# Patient Record
Sex: FEMALE | Race: WHITE | Employment: OTHER | RURAL
[De-identification: names, ages, dates, MRNs, and addresses within clinical notes are randomized per-mention and may not be internally consistent; named-entity substitution may affect disease eponyms.]

---

## 2017-02-06 RX ORDER — ZOLPIDEM TARTRATE 5 MG/1
5-10 TABLET ORAL
Qty: 30 TAB | Refills: 0 | Status: SHIPPED | OUTPATIENT
Start: 2017-02-06 | End: 2017-02-09 | Stop reason: SDUPTHER

## 2017-02-08 ENCOUNTER — DOCUMENTATION ONLY (OUTPATIENT)
Dept: FAMILY MEDICINE CLINIC | Age: 77
End: 2017-02-08

## 2017-02-08 NOTE — PROGRESS NOTES
HISTORY OF PRESENT ILLNESS  Riddhi Morocho is a 77 y.o. female.   HPI    ROS    Physical Exam    ASSESSMENT and PLAN  {ASSESSMENT/PLAN:[id number]}

## 2017-02-10 RX ORDER — ZOLPIDEM TARTRATE 5 MG/1
5-10 TABLET ORAL
Qty: 30 TAB | Refills: 0 | Status: SHIPPED | OUTPATIENT
Start: 2017-02-10 | End: 2017-04-17 | Stop reason: SDUPTHER

## 2017-04-17 ENCOUNTER — OFFICE VISIT (OUTPATIENT)
Dept: FAMILY MEDICINE CLINIC | Age: 77
End: 2017-04-17

## 2017-04-17 VITALS
BODY MASS INDEX: 28.7 KG/M2 | HEART RATE: 63 BPM | TEMPERATURE: 97.7 F | WEIGHT: 152 LBS | OXYGEN SATURATION: 100 % | RESPIRATION RATE: 16 BRPM | DIASTOLIC BLOOD PRESSURE: 49 MMHG | HEIGHT: 61 IN | SYSTOLIC BLOOD PRESSURE: 134 MMHG

## 2017-04-17 DIAGNOSIS — Z85.43 HISTORY OF OVARIAN CANCER: ICD-10-CM

## 2017-04-17 DIAGNOSIS — Z13.39 SCREENING FOR ALCOHOLISM: ICD-10-CM

## 2017-04-17 DIAGNOSIS — E78.00 PURE HYPERCHOLESTEROLEMIA: ICD-10-CM

## 2017-04-17 DIAGNOSIS — I10 UNSPECIFIED ESSENTIAL HYPERTENSION: ICD-10-CM

## 2017-04-17 DIAGNOSIS — F51.01 PRIMARY INSOMNIA: ICD-10-CM

## 2017-04-17 DIAGNOSIS — Z13.31 SCREENING FOR DEPRESSION: ICD-10-CM

## 2017-04-17 DIAGNOSIS — Z00.00 ROUTINE GENERAL MEDICAL EXAMINATION AT A HEALTH CARE FACILITY: Primary | ICD-10-CM

## 2017-04-17 DIAGNOSIS — C56.9 MALIGNANT NEOPLASM OF OVARY, UNSPECIFIED LATERALITY (HCC): ICD-10-CM

## 2017-04-17 RX ORDER — ZOLPIDEM TARTRATE 5 MG/1
5 TABLET ORAL
Qty: 90 TAB | Refills: 1 | Status: SHIPPED | OUTPATIENT
Start: 2017-04-17 | End: 2017-04-21 | Stop reason: SDUPTHER

## 2017-04-17 NOTE — PROGRESS NOTES
Duran Schuler is a 77 y.o. female presenting for/with:    Cholesterol Problem and Annual Wellness Visit ()    HPI:  HTN  BP's ok lately. Remains on norvasc, hyzaar, aldactone, and metoprolol. Taking regularly. Lab Results   Component Value Date/Time    Sodium 137 11/15/2016 08:54 AM    Potassium 4.5 11/15/2016 08:54 AM    Chloride 96 11/15/2016 08:54 AM    CO2 26 11/15/2016 08:54 AM    Glucose 113 11/15/2016 08:54 AM    BUN 13 11/15/2016 08:54 AM    Creatinine 0.81 11/15/2016 08:54 AM    BUN/Creatinine ratio 16 11/15/2016 08:54 AM    GFR est AA 82 11/15/2016 08:54 AM    GFR est non-AA 71 11/15/2016 08:54 AM    Calcium 9.4 11/15/2016 08:54 AM     Hyperlipidemia. On questran and benefiber. Aren well. No myalgias, arthralgias, unusual weakness. Lab Results   Component Value Date/Time    Cholesterol, total 204 11/15/2016 08:54 AM    HDL Cholesterol 48 11/15/2016 08:54 AM    LDL, calculated 102 11/15/2016 08:54 AM    VLDL, calculated 54 11/15/2016 08:54 AM    Triglyceride 269 11/15/2016 08:54 AM     Lab Results   Component Value Date/Time    ALT (SGPT) 22 11/15/2016 08:54 AM    AST (SGOT) 18 11/15/2016 08:54 AM    Alk. phosphatase 73 11/15/2016 08:54 AM    Bilirubin, total 0.3 11/15/2016 08:54 AM     LBP  Has been doing better since last visit on aleve. PMH, SH, Medications/Allergies: reviewed, on chart.     ROS:  Constitutional: No fever, chills or weight loss  Respiratory: No cough, SOB   CV: No chest pain or Palpitations    Visit Vitals    /49 (BP 1 Location: Right arm, BP Patient Position: Sitting)    Pulse 63    Temp 97.7 °F (36.5 °C) (Oral)    Resp 16    Ht 5' 1\" (1.549 m)    Wt 152 lb (68.9 kg)    SpO2 100%    BMI 28.72 kg/m2     Wt Readings from Last 3 Encounters:   04/17/17 152 lb (68.9 kg)   11/17/16 152 lb (68.9 kg)   09/14/16 150 lb (68 kg)     BP Readings from Last 3 Encounters:   04/17/17 134/49   11/17/16 125/60   09/14/16 146/66     Physical Examination: General appearance - alert, well appearing, and in no distress  Mental status - alert, oriented to person, place, and time  Eyes - pupils equal and reactive, extraocular eye movements intact  ENT - bilateral external ears and nose normal. Normal lips  Neck - supple, no significant adenopathy, no thyromegaly or mass  Lymphatics - no palpable lymphadenopathy, no hepatosplenomegaly  Chest - clear to auscultation, no wheezes, rales or rhonchi, symmetric air entry  Heart - normal rate, regular rhythm, normal S1, S2, no murmurs, rubs, clicks or gallops  Abd - NABS. SNT, no HSM/Mass. Extremities - peripheral pulses normal, no pedal edema, no clubbing or cyanosis. Lab Results   Component Value Date/Time    WBC 7.4 04/14/2016 04:04 PM    HGB 11.3 04/14/2016 04:04 PM    HCT 33.5 04/14/2016 04:04 PM    PLATELET 563 62/20/7658 04:04 PM    MCV 83 04/14/2016 04:04 PM     A/P:  HTN  In goal. Con't current regimen. Hx ovarian cancer. Doing well on questran at [facility]. Last Ca125 was ok 11/2016. Recheck today. HLD  lipid panel looks good on questran. Con't. DJD  Try aleve. Also can use APAP or T3's (has plenty). If working well, can order at [facility]. Insomnia  Doing well on ambien 5mg QHS. Try changing to mail ore 1 QHS #90 RF1. F/U 6mo/PRN    __________________________________________________________________________________________________________________________    Problem List: Reviewed with patient and discussed risk factors.     Patient Active Problem List   Diagnosis Code    Unspecified essential hypertension I10    Hyperlipemia E78.5    Esophageal reflux K21.9    Malignant neoplasm of ovary (HCC) C56.9    Cystocele N81.10    Presence of pessary Z92.89    Cervical prolapse N81.4    Postmenopausal vaginal bleeding N95.0    BCE (basal cell epithelioma) C44.91    History of ovarian cancer Z85.43    Midline low back pain without sciatica M54.5       Current medical providers:  Patient Care Team:  Caitlin Acosta MD as PCP - General (Family Practice)    PSH: Reviewed with patient  Past Surgical History:   Procedure Laterality Date    HX APPENDECTOMY      HX COLECTOMY      ([facility])    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    HX SALPINGO-OOPHORECTOMY  7/03    Exploratory Lap, omentectomy, tumor debulking Chely Yates)    HX TONSILLECTOMY  1964        SH: Reviewed with patient  Social History   Substance Use Topics    Smoking status: Former Smoker    Smokeless tobacco: Never Used    Alcohol use 0.0 oz/week     0 Standard drinks or equivalent per week      Comment: minimal       FH: Reviewed with patient  Family History   Problem Relation Age of Onset    Stroke Mother     Heart Disease Father        Medications/Allergies: Reviewed with patient  Current Outpatient Prescriptions on File Prior to Visit   Medication Sig Dispense Refill    spironolactone (ALDACTONE) 25 mg tablet TAKE 1/2 TABLET BY MOUTH DAILY 45 Tab 3    omeprazole (PRILOSEC) 20 mg capsule TAKE 1 CAP BY MOUTH TWO  TIMES A DAY. INDICATIONS:  PREVENTION OF NSAID-INDUCED GASTRIC ULCER 180 Cap 3    losartan-hydroCHLOROthiazide (HYZAAR) 100-12.5 mg per tablet Take 1 Tab by mouth daily. Indications: pressure 90 Tab 3    metoprolol tartrate (LOPRESSOR) 50 mg tablet Take 1 Tab by mouth two (2) times a day. Indications: pressure and heart 180 Tab 3    amLODIPine (NORVASC) 10 mg tablet Take 1 Tab by mouth daily. Indications: pressure 90 Tab 3    acetaminophen (TYLENOL ARTHRITIS PAIN) 650 mg CR tablet Take 650 mg by mouth every six (6) hours as needed for Pain.  acetaminophen-codeine (TYLENOL #3) 300-30 mg per tablet Take 1 Tab by mouth two (2) times daily as needed for Pain. Max Daily Amount: 2 Tabs. 180 Tab 1    pregabalin (LYRICA) 75 mg capsule Take 1 Cap by mouth two (2) times daily as needed. Max Daily Amount: 150 mg. Indications: neuropathy 14 Cap 0    cholestyramine-sucrose (QUESTRAN) 4 gram powder Take 4 g by mouth two (2) times daily (with meals).  6 Can 3    aspirin delayed-release 81 mg tablet Take  by mouth daily.  ALPRAZolam (XANAX) 0.5 mg tablet Take 0.5 mg by mouth. 1/2 Pill PRN when needed       No current facility-administered medications on file prior to visit. Allergies   Allergen Reactions    Feldene [Piroxicam] Other (comments)     Stomach problem    Toradol [Ketorolac Tromethamine] Itching    Voltaren [Diclofenac Sodium] Diarrhea       Objective:  Visit Vitals    /49 (BP 1 Location: Right arm, BP Patient Position: Sitting)    Pulse 63    Temp 97.7 °F (36.5 °C) (Oral)    Resp 16    Ht 5' 1\" (1.549 m)    Wt 152 lb (68.9 kg)    SpO2 100%    BMI 28.72 kg/m2    Body mass index is 28.72 kg/(m^2). Assessment of cognitive impairment: Alert and oriented x 3    Depression Screen:   PHQ 2 / 9, over the last two weeks 4/17/2017   Little interest or pleasure in doing things Not at all   Feeling down, depressed or hopeless Not at all   Total Score PHQ 2 0       Fall Risk Assessment:    Fall Risk Assessment, last 12 mths 4/17/2017   Able to walk? Yes   Fall in past 12 months? No   Fall with injury? -   Number of falls in past 12 months -   Fall Risk Score -       Functional Ability:   Does the patient exhibit a steady gait? yes   How long did it take the patient to get up and walk from a sitting position? 2 s   Is the patient self reliant?  (ie can do own laundry, meals, household chores)  yes     Does the patient handle his/her own medications? yes     Does the patient handle his/her own money? yes     Is the patients home safe (ie good lighting, handrails on stairs and bath, etc.)? yes     Did you notice or did patient express any hearing difficulties? yes     Did you notice or did patient express any vision difficulties?   no     Were distance and reading eye charts used?   no       Advance Care Planning:   Patient was offered the opportunity to discuss advance care planning:  yes     Does patient have an Advance Directive:  yes   If no, did you provide information on Caring Connections?  no       Plan:      Pt declined DXA at this time. Colon checks probably completed, had good looking CT abd/pelvis 2015 without lesion and is over 75yo now. Health Maintenance   Topic Date Due    DTaP/Tdap/Td series (1 - Tdap) 08/25/1962    ZOSTER VACCINE AGE 60>  08/25/2001    GLAUCOMA SCREENING Q2Y  08/25/2006    OSTEOPOROSIS SCREENING (DEXA)  08/25/2006    MEDICARE YEARLY EXAM  08/25/2006    Pneumococcal 65+ High/Highest Risk (2 of 2 - PPSV23) 01/08/2016    INFLUENZA AGE 9 TO ADULT  08/01/2016     *Patient verbalized understanding and agreement with the plan. A copy of the After Visit Summary with personalized health plan was given to the patient today.

## 2017-04-17 NOTE — PATIENT INSTRUCTIONS
Schedule of Personalized Health Plan  (Provide Copy to Patient)  The best way to stay healthy is to live a healthy lifestyle. A healthy lifestyle includes regular exercise, eating a well-balanced diet, keeping a healthy weight and not smoking. Regular physical exams and screening tests are another important way to take care of yourself. Preventive exams provided by health care providers can find health problems early when treatment works best and can keep you from getting certain diseases or illnesses. Preventive services include exams, lab tests, screenings, shots, monitoring and information to help you take care of your own health. All people over 65 should have a pneumonia shot. Pneumonia shots are usually only needed once in a lifetime unless your doctor decides differently. All people over 65 should have a yearly flu shot. People over 65 are at medium to high risk for Hepatitis B. Three shots are needed for complete protection. In addition to your physical exam, some screening tests are recommended:    Bone mass measurement (dexa scan) is recommended every two years  Diabetes Mellitus screening is recommended every year. Glaucoma is an eye disease caused by high pressure in the eye. An eye exam is recommended every year. Cardiovascular screening tests that check your cholesterol and other blood fat (lipid) levels are recommended every five years. Colorectal Cancer screening tests help to find pre-cancerous polyps (growths in the colon) so they can be removed before they turn into cancer. Tests ordered for screening depend on your personal and family history risk factors.     Screening for Breast Cancer is recommended yearly with a mammogram.    Screening for Cervical Cancer is recommended every two years (annually for certain risk factors, such as previous history of STD or abnormal PAP in past 7 years), with a Pelvic Exam with PAP    Here is a list of your current Health Maintenance items with a due date:  Health Maintenance   Topic Date Due    DTaP/Tdap/Td series (1 - Tdap) 08/25/1962    ZOSTER VACCINE AGE 60>  08/25/2001    GLAUCOMA SCREENING Q2Y  08/25/2006    OSTEOPOROSIS SCREENING (DEXA)  08/25/2006    MEDICARE YEARLY EXAM  08/25/2006    Pneumococcal 65+ High/Highest Risk (2 of 2 - PPSV23) 01/08/2016    INFLUENZA AGE 9 TO ADULT  08/01/2016

## 2017-04-17 NOTE — MR AVS SNAPSHOT
Visit Information Date & Time Provider Department Dept. Phone Encounter #  
 4/17/2017 10:10 AM Rima Mann MD [address] [place] [address] [id number] Upcoming Health Maintenance Date Due DTaP/Tdap/Td series (1 - Tdap) 8/25/1962 ZOSTER VACCINE AGE 60> 8/25/2001 GLAUCOMA SCREENING Q2Y 8/25/2006 OSTEOPOROSIS SCREENING (DEXA) 8/25/2006 MEDICARE YEARLY EXAM 8/25/2006 Pneumococcal 65+ High/Highest Risk (2 of 2 - PPSV23) 1/8/2016 INFLUENZA AGE 9 TO ADULT 8/1/2016 Allergies as of 4/17/2017  Review Complete On: 4/17/2017 By: Rima Mann MD  
  
 Severity Noted Reaction Type Reactions Feldene [Piroxicam]  02/11/2016    Other (comments) Stomach problem Toradol [Ketorolac Tromethamine]  06/10/2013    Itching Voltaren [Diclofenac Sodium]  06/10/2013    Diarrhea Current Immunizations  Never Reviewed Name Date Pneumococcal Conjugate (PCV-13) 11/13/2015 Pneumococcal Polysaccharide (PPSV-23) 10/29/2004 Not reviewed this visit You Were Diagnosed With   
  
 Codes Comments Routine general medical examination at a health care facility    -  Primary ICD-10-CM: Z00.00 ICD-9-CM: V70.0 History of ovarian cancer     ICD-10-CM: Z85.43 
ICD-9-CM: V10.43 Malignant neoplasm of ovary, unspecified laterality ([facility] [address]     ICD-10-CM: C56.9 ICD-9-CM: 183.0 Unspecified essential hypertension     ICD-10-CM: I10 
ICD-9-CM: 401.9 Pure hypercholesterolemia     ICD-10-CM: E78.00 ICD-9-CM: 272.0 Primary insomnia     ICD-10-CM: F51.01 
ICD-9-CM: 307.42 Screening for alcoholism     ICD-10-CM: Z13.89 ICD-9-CM: V79.1 Screening for depression     ICD-10-CM: Z13.89 ICD-9-CM: V79.0 Vitals BP Pulse Temp Resp Height(growth percentile) Weight(growth percentile) 134/49 (BP 1 Location: Right arm, BP Patient Position: Sitting) 63 97.7 °F (36.5 °C) (Oral) 16 5' 1\" (1.549 m) 152 lb (68.9 kg) SpO2 BMI OB Status Smoking Status 100% 28.72 kg/m2 Postmenopausal Former Smoker BMI and BSA Data Body Mass Index Body Surface Area 28.72 kg/m 2 1.72 m 2 Preferred Pharmacy Pharmacy Name Phone [facility] AT [facility] [address] [place] [phone] Your Updated Medication List  
  
   
This list is accurate as of: 4/17/17 11:33 AM.  Always use your most recent med list.  
  
  
  
  
 acetaminophen-codeine 300-30 mg per tablet Commonly known as:  TYLENOL #3 Take 1 Tab by mouth two (2) times daily as needed for Pain. Max Daily Amount: 2 Tabs. amLODIPine 10 mg tablet Commonly known as:  Alice Rafter Take 1 Tab by mouth daily. Indications: pressure  
  
 aspirin delayed-release 81 mg tablet Take  by mouth daily. cholestyramine-sucrose 4 gram powder Commonly known as:  [place] Take 4 g by mouth two (2) times daily (with meals). losartan-hydroCHLOROthiazide 100-12.5 mg per tablet Commonly known as:  HYZAAR Take 1 Tab by mouth daily. Indications: pressure  
  
 metoprolol tartrate 50 mg tablet Commonly known as:  LOPRESSOR Take 1 Tab by mouth two (2) times a day. Indications: pressure and heart  
  
 omeprazole 20 mg capsule Commonly known as:  PRILOSEC  
TAKE 1 CAP BY MOUTH TWO  TIMES A DAY. INDICATIONS:  PREVENTION OF NSAID-INDUCED GASTRIC ULCER  
  
 pregabalin 75 mg capsule Commonly known as:  Abe Handsome Take 1 Cap by mouth two (2) times daily as needed. Max Daily Amount: 150 mg. Indications: neuropathy  
  
 spironolactone 25 mg tablet Commonly known as:  ALDACTONE  
TAKE 1/2 TABLET BY MOUTH DAILY  
  
 TYLENOL ARTHRITIS PAIN 650 mg CR tablet Generic drug:  acetaminophen Take 650 mg by mouth every six (6) hours as needed for Pain. XANAX 0.5 mg tablet Generic drug:  ALPRAZolam  
Take 0.5 mg by mouth. 1/2 Pill PRN when needed  
  
 zolpidem 5 mg tablet Commonly known as:  AMBIEN Take 1 Tab by mouth nightly as needed for Sleep. Max Daily Amount: 5 mg. Indications: Sleep Prescriptions Printed Refills  
 zolpidem (AMBIEN) 5 mg tablet 1 Sig: Take 1 Tab by mouth nightly as needed for Sleep. Max Daily Amount: 5 mg. Indications: Sleep Class: Print Route: Oral  
  
We Performed the Following CANCER ANTIGEN 125 [45075 CPT(R)] Juliaf 68 [[id number] [facility]] METABOLIC PANEL, BASIC [18395 CPT(R)] Patient Instructions Schedule of Personalized Health Plan (Provide Copy to Patient) The best way to stay healthy is to live a healthy lifestyle. A healthy lifestyle includes regular exercise, eating a well-balanced diet, keeping a healthy weight and not smoking. Regular physical exams and screening tests are another important way to take care of yourself. Preventive exams provided by health care providers can find health problems early when treatment works best and can keep you from getting certain diseases or illnesses. Preventive services include exams, lab tests, screenings, shots, monitoring and information to help you take care of your own health. All people over 65 should have a pneumonia shot. Pneumonia shots are usually only needed once in a lifetime unless your doctor decides differently. All people over 65 should have a yearly flu shot. People over 65 are at medium to high risk for Hepatitis B. Three shots are needed for complete protection. In addition to your physical exam, some screening tests are recommended: 
 
Bone mass measurement (dexa scan) is recommended every two years Diabetes Mellitus screening is recommended every year. Glaucoma is an eye disease caused by high pressure in the eye. An eye exam is recommended every year. Cardiovascular screening tests that check your cholesterol and other blood fat (lipid) levels are recommended every five years. Colorectal Cancer screening tests help to find pre-cancerous polyps (growths in the colon) so they can be removed before they turn into cancer. Tests ordered for screening depend on your personal and family history risk factors. Screening for Breast Cancer is recommended yearly with a mammogram. 
 
Screening for Cervical Cancer is recommended every two years (annually for certain risk factors, such as previous history of STD or abnormal PAP in past 7 years), with a Pelvic Exam with PAP Here is a list of your current Health Maintenance items with a due date: 
Health Maintenance Topic Date Due  
 DTaP/Tdap/Td series (1 - Tdap) 08/25/1962  ZOSTER VACCINE AGE 60>  08/25/2001  GLAUCOMA SCREENING Q2Y  08/25/2006  OSTEOPOROSIS SCREENING (DEXA)  08/25/2006  MEDICARE YEARLY EXAM  08/25/2006  Pneumococcal 65+ High/Highest Risk (2 of 2 - PPSV23) 01/08/2016  INFLUENZA AGE 9 TO ADULT  08/01/2016 Introducing [facility]! Neeraj Boswell introduces [employer] patient portal. Now you can access parts of your medical record, email your doctor's office, and request medication refills online. 1. In your internet browser, go to [email or web address] 2. Click on the First Time User? Click Here link in the Sign In box. You will see the New Member Sign Up page. 3. Enter your [employer] Access Code exactly as it appears below. You will not need to use this code after youve completed the sign-up process. If you do not sign up before the expiration date, you must request a new code. · [employer] Access Code: [email or web address] Expires: 7/16/2017  9:52 AM 
 
4. Enter the last four digits of your Social Security Number (xxxx) and Date of Birth (mm/dd/yyyy) as indicated and click Submit. You will be taken to the next sign-up page. 5. Create a [employer] ID. This will be your [employer] login ID and cannot be changed, so think of one that is secure and easy to remember. 6. Create a [employer] password. You can change your password at any time. 7. Enter your Password Reset Question and Answer. This can be used at a later time if you forget your password. 8. Enter your e-mail address. You will receive e-mail notification when new information is available in [address] 9. Click Sign Up. You can now view and download portions of your medical record. 10. Click the Download Summary menu link to download a portable copy of your medical information. If you have questions, please visit the Frequently Asked Questions section of the [employer] website. Remember, [employer] is NOT to be used for urgent needs. For medical emergencies, dial 911. Now available from your iPhone and Android! Please provide this summary of care documentation to your next provider. Your primary care clinician is listed as Caleb Tellez. If you have any questions after today's visit, please call [phone].

## 2017-04-18 LAB
BUN SERPL-MCNC: 10 MG/DL (ref 8–27)
BUN/CREAT SERPL: 13 (ref 12–28)
CALCIUM SERPL-MCNC: 9.3 MG/DL (ref 8.7–10.3)
CANCER AG125 SERPL-ACNC: 10.1 U/ML (ref 0–38.1)
CHLORIDE SERPL-SCNC: 95 MMOL/L (ref 96–106)
CO2 SERPL-SCNC: 23 MMOL/L (ref 18–29)
CREAT SERPL-MCNC: 0.78 MG/DL (ref 0.57–1)
GLUCOSE SERPL-MCNC: 113 MG/DL (ref 65–99)
POTASSIUM SERPL-SCNC: 4.7 MMOL/L (ref 3.5–5.2)
SODIUM SERPL-SCNC: 136 MMOL/L (ref 134–144)

## 2017-04-21 DIAGNOSIS — F51.01 PRIMARY INSOMNIA: ICD-10-CM

## 2017-04-24 RX ORDER — ZOLPIDEM TARTRATE 5 MG/1
5 TABLET ORAL
Qty: 30 TAB | Refills: 5 | Status: SHIPPED | OUTPATIENT
Start: 2017-04-24 | End: 2017-11-17 | Stop reason: SDUPTHER

## 2017-07-11 ENCOUNTER — OFFICE VISIT (OUTPATIENT)
Dept: FAMILY MEDICINE CLINIC | Age: 77
End: 2017-07-11

## 2017-07-11 VITALS
SYSTOLIC BLOOD PRESSURE: 141 MMHG | WEIGHT: 152 LBS | TEMPERATURE: 97.9 F | RESPIRATION RATE: 14 BRPM | HEIGHT: 61 IN | OXYGEN SATURATION: 100 % | DIASTOLIC BLOOD PRESSURE: 54 MMHG | BODY MASS INDEX: 28.7 KG/M2 | HEART RATE: 74 BPM

## 2017-07-11 DIAGNOSIS — M25.571 ACUTE RIGHT ANKLE PAIN: ICD-10-CM

## 2017-07-11 DIAGNOSIS — R25.2 BILATERAL LEG CRAMPS: ICD-10-CM

## 2017-07-11 DIAGNOSIS — L84 CALLUS OF FOOT: Primary | ICD-10-CM

## 2017-07-11 DIAGNOSIS — H91.92 HEARING DIFFICULTY OF LEFT EAR: ICD-10-CM

## 2017-07-11 NOTE — PROGRESS NOTES
Chief Complaint   Patient presents with    Ankle Pain     right    Foot Pain     right    Spasms     cramps         HPI:      Tim Morocho is a 77 y.o. female. New Issues:  Right ankle bone pain and issues with calluses for about 6 weeks. Tried arthritis creams and soaking her feet without relief. Describes the pain as an \"ache\". Denies injury. Sprained her ankle 2 years ago, but no issues since. Denies travel. Only takes Lyrica in the winter due to her poor circulation. Already has inserts for her shoes. Has sensitivities to NSAIDS, but can tolerate Tylenol. Her left ear seemed to have decreased hearing a few weeks ago. Has improved with ear drops OTC. Has an appointment with audiology but may cancel it because her hearing seems better. Has had leg cramping at night sometimes. Has had it worked up before. No changes since previously. Allergies   Allergen Reactions    Feldene [Piroxicam] Other (comments)     Stomach problem    Toradol [Ketorolac Tromethamine] Itching    Voltaren [Diclofenac Sodium] Diarrhea       Current Outpatient Prescriptions   Medication Sig    spironolactone (ALDACTONE) 25 mg tablet TAKE 1/2 TABLET BY MOUTH DAILY    zolpidem (AMBIEN) 5 mg tablet Take 1 Tab by mouth nightly as needed for Sleep. Max Daily Amount: 5 mg. Indications: Sleep    omeprazole (PRILOSEC) 20 mg capsule TAKE 1 CAP BY MOUTH TWO  TIMES A DAY. INDICATIONS:  PREVENTION OF NSAID-INDUCED GASTRIC ULCER    losartan-hydroCHLOROthiazide (HYZAAR) 100-12.5 mg per tablet Take 1 Tab by mouth daily. Indications: pressure    metoprolol tartrate (LOPRESSOR) 50 mg tablet Take 1 Tab by mouth two (2) times a day. Indications: pressure and heart    amLODIPine (NORVASC) 10 mg tablet Take 1 Tab by mouth daily. Indications: pressure    cholestyramine-sucrose (QUESTRAN) 4 gram powder Take 4 g by mouth two (2) times daily (with meals).     acetaminophen (TYLENOL ARTHRITIS PAIN) 650 mg CR tablet Take 650 mg by mouth every six (6) hours as needed for Pain.  acetaminophen-codeine (TYLENOL #3) 300-30 mg per tablet Take 1 Tab by mouth two (2) times daily as needed for Pain. Max Daily Amount: 2 Tabs.  pregabalin (LYRICA) 75 mg capsule Take 1 Cap by mouth two (2) times daily as needed. Max Daily Amount: 150 mg. Indications: neuropathy    ALPRAZolam (XANAX) 0.5 mg tablet Take 0.5 mg by mouth. 1/2 Pill PRN when needed     No current facility-administered medications for this visit. Past Medical History:   Diagnosis Date    Cancer ([facility] [address]     ovarian    [facility]    Cat scratch fever 09/91    Cervical prolapse 2011    Grade II    Chicken pox 1962    Contact dermatitis and other eczema, due to unspecified cause     Cystocele 2011    grade I    Diverticulosis     1985    Duodenal ulcer     Eye disorder 06/95    recurrent erosion of L eye    GERD (gastroesophageal reflux disease)     [place] measles 1964    Hypercholesterolemia     Hypertension     Menarche     onset at age 16    Menopause     onset at age 47    Neuropathy     Ovarian cancer ([facility] 07/03    Poorly diff.     Postmenopausal vaginal bleeding 9/19/2013    Due to Granulation tissue from her pessary, treated w/ silver nitrate stick on 9.19.2013    Presence of pessary     #2    PARUL (stress urinary incontinence, female)        Past Surgical History:   Procedure Laterality Date    HX APPENDECTOMY      HX COLECTOMY      ([address]    HX DILATION AND CURETTAGE  1963    colonization    HX SALPINGO-OOPHORECTOMY  7/03    Exploratory Lap, omentectomy, tumor debulking Hanna Lou)    HX TONSILLECTOMY  1964       Social History     Social History    Marital status: UNKNOWN     Spouse name: N/A    Number of children: 0    Years of education: N/A     Occupational History    front office, coding Retired     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, [employer]     Social History Main Topics    Smoking status: Former Smoker    Smokeless tobacco: Never Used    Alcohol use 0.0 oz/week     0 Standard drinks or equivalent per week      Comment: minimal    Drug use: No    Sexual activity: No     Other Topics Concern     Service No    Blood Transfusions No    Caffeine Concern No    Occupational Exposure No    Hobby Hazards No    Sleep Concern No    Stress Concern No    Weight Concern No    Special Diet No    Back Care No    Exercise No    Bike Helmet No    Seat Belt Yes    Self-Exams No     Social History Narrative       Family History   Problem Relation Age of Onset    Stroke Mother     Heart Disease Father        Above history reviewed. ROS:  Denies fever, chills, cough, chest pain, SOB,  nausea, vomiting, or diarrhea. Denies wt loss, wt gain, hemoptysis, hematochezia or melena. Physical Examination:    /54 (BP 1 Location: Right arm, BP Patient Position: Sitting)  Pulse 74  Temp 97.9 °F (36.6 °C) (Oral)   Resp 14  Ht 5' 1\" (1.549 m)  Wt 152 lb (68.9 kg)  SpO2 100%  BMI 28.72 kg/m2    General: Alert and Ox3, Fluent speech  HEENT:  PERRLA, EOM intact, TMs, turbinates, pharynx normal.  No thyromegaly. No cervical adenopathy. Abdomen:  +BS, soft, nontender without palpable HSM  Extremities:  No cyanosis, clubbing or edema. Right foot with small callus lateral foot. Neurologic:  Ambulatory without assist, CN 2-12 grossly intact. Moves all extremities. Skin: no rash    ASSESSMENT AND PLAN:     1. Callus of foot  Provided reassurance. Callus is filed down close to the level of the foot. May use shoe inserts for comfort. Patient would like to see a podiatrist for expert opinion  - REFERRAL TO PODIATRY; Future    2. Acute right ankle pain  Likely related to arthritis, no obvious signs of deformity or point TTP. No imaging needed at this time. Patient to try Tylenol for pain relief. If no improvement in 2 weeks, will consider imaging then. 3. Hearing difficulty of left ear  Resolved.   Encouraged patient to keep appointment with Audiology for work-up. 4. Bilateral leg cramps  Reviewed previous labs with patient. Electrolytes WNL. Consider adding Mag and CK to labs next visit. Try a tsp of mustard at bedtime to cut down on cramps.        Yudith Wise, NP

## 2017-07-11 NOTE — PATIENT INSTRUCTIONS
Corns and Calluses: Care Instructions  Your Care Instructions  Corns and calluses are areas of thick, hard, dead skin. They form to protect your skin from injury. Corns usually form where toes rub together. Calluses often form on the hands or feet. They may form wherever the skin rubs against something, such as shoes. In most cases, you can take steps at home to care for a corn or callus. Follow-up care is a key part of your treatment and safety. Be sure to make and go to all appointments, and call your doctor if you are having problems. It's also a good idea to know your test results and keep a list of the medicines you take. How can you care for yourself at home? · Wear shoes and other footwear that fit correctly and are roomy. This will reduce rubbing and give corns or calluses time to heal.  · Use protective pads, such as moleskin, to cushion the callus or corn. · Soften the corn or callus and remove the dead skin by using over-the-counter products such as salicylic acid. If you have a condition that causes problems with blood flow (such as coronary artery disease) or loss of feeling in your feet (such as diabetes), talk to your doctor before you try any home treatment. · Soak your corn or callus in warm water, and then use a pumice stone to rub dead skin away. · Wash your feet regularly, and rub lotion into your feet while they are still moist. Dry skin can cause a callus to crack and bleed. · Never cut the corn or callus yourself, especially if you have problems with blood flow to your legs or feet. When should you call for help? Call your doctor now or seek immediate medical care if:  · You have signs of infection, such as:  ¨ Increased pain, swelling, warmth, or redness around the corn or callus. ¨ Red streaks leading from the corn or callus. ¨ Pus draining from the corn or callus. ¨ A fever.   Watch closely for changes in your health, and be sure to contact your doctor if:  · You do not get better as expected. Where can you learn more? Go to http://gonzalo-donny.info/. Enter R244 in the search box to learn more about \"Corns and Calluses: Care Instructions. \"  Current as of: October 13, 2016  Content Version: 11.3  © 8487-2643 [employer]. Care instructions adapted under license by [employer] (which disclaims liability or warranty for this information). If you have questions about a medical condition or this instruction, always ask your healthcare professional. [address] any warranty or liability for your use of this information.

## 2017-07-11 NOTE — MR AVS SNAPSHOT
Visit Information Date & Time Provider Department Dept. Phone Encounter #  
 7/11/2017 10:30 AM Ana Driver NP [place] [address] [phone] [id number] Upcoming Health Maintenance Date Due DTaP/Tdap/Td series (1 - Tdap) 8/25/1962 OSTEOPOROSIS SCREENING (DEXA) 8/25/2006 GLAUCOMA SCREENING Q2Y 6/12/2015 Pneumococcal 65+ High/Highest Risk (2 of 2 - PPSV23) 1/8/2016 INFLUENZA AGE 9 TO ADULT 8/1/2017 MEDICARE YEARLY EXAM 4/18/2018 Allergies as of 7/11/2017  Review Complete On: 7/11/2017 By: Ana Driver NP Severity Noted Reaction Type Reactions Feldene [Piroxicam]  02/11/2016    Other (comments) Stomach problem Toradol [Ketorolac Tromethamine]  06/10/2013    Itching Voltaren [Diclofenac Sodium]  06/10/2013    Diarrhea Current Immunizations  Never Reviewed Name Date Pneumococcal Conjugate (PCV-13) 11/13/2015 Pneumococcal Polysaccharide (PPSV-23) 10/29/2004 Zoster Vaccine, Live 1/31/2012 Not reviewed this visit You Were Diagnosed With   
  
 Codes Comments Callus of foot    -  Primary ICD-10-CM: L84 
ICD-9-CM: 962 Acute right ankle pain     ICD-10-CM: M25.571 ICD-9-CM: 719.47, 338.19 Vitals BP Pulse Temp Resp Height(growth percentile) Weight(growth percentile) 141/54 (BP 1 Location: Right arm, BP Patient Position: Sitting) 74 97.9 °F (36.6 °C) (Oral) 14 5' 1\" (1.549 m) 152 lb (68.9 kg) SpO2 BMI OB Status Smoking Status 100% 28.72 kg/m2 Postmenopausal Former Smoker BMI and BSA Data Body Mass Index Body Surface Area 28.72 kg/m 2 1.72 m 2 Preferred Pharmacy Pharmacy Name Phone [address], [address] AT [facility]  [address] [facility] Preethi Lowry [phone] Your Updated Medication List  
  
   
This list is accurate as of: 7/11/17 11:08 AM.  Always use your most recent med list.  
  
  
  
  
 acetaminophen-codeine 300-30 mg per tablet Commonly known as:  TYLENOL #3 Take 1 Tab by mouth two (2) times daily as needed for Pain. Max Daily Amount: 2 Tabs. amLODIPine 10 mg tablet Commonly known as:  Joaquín Inks Take 1 Tab by mouth daily. Indications: pressure  
  
 cholestyramine-sucrose 4 gram powder Commonly known as:  Tonye Bonfield Take 4 g by mouth two (2) times daily (with meals). losartan-hydroCHLOROthiazide 100-12.5 mg per tablet Commonly known as:  HYZAAR Take 1 Tab by mouth daily. Indications: pressure  
  
 metoprolol tartrate 50 mg tablet Commonly known as:  LOPRESSOR Take 1 Tab by mouth two (2) times a day. Indications: pressure and heart  
  
 omeprazole 20 mg capsule Commonly known as:  PRILOSEC  
TAKE 1 CAP BY MOUTH TWO  TIMES A DAY. INDICATIONS:  PREVENTION OF NSAID-INDUCED GASTRIC ULCER  
  
 pregabalin 75 mg capsule Commonly known as:  Edmar Mall Take 1 Cap by mouth two (2) times daily as needed. Max Daily Amount: 150 mg. Indications: neuropathy  
  
 spironolactone 25 mg tablet Commonly known as:  ALDACTONE  
TAKE 1/2 TABLET BY MOUTH DAILY  
  
 TYLENOL ARTHRITIS PAIN 650 mg CR tablet Generic drug:  acetaminophen Take 650 mg by mouth every six (6) hours as needed for Pain. XANAX 0.5 mg tablet Generic drug:  ALPRAZolam  
Take 0.5 mg by mouth. 1/2 Pill PRN when needed  
  
 zolpidem 5 mg tablet Commonly known as:  AMBIEN Take 1 Tab by mouth nightly as needed for Sleep. Max Daily Amount: 5 mg. Indications: Sleep Introducing [facility]! Austin Irene introduces [employer] patient portal. Now you can access parts of your medical record, email your doctor's office, and request medication refills online. 1. In your internet browser, go to [email or web address] 2. Click on the First Time User? Click Here link in the Sign In box. You will see the New Member Sign Up page. 3. Enter your [employer] Access Code exactly as it appears below. You will not need to use this code after youve completed the sign-up process. If you do not sign up before the expiration date, you must request a new code. · [employer] Access Code: [email or web address] Expires: 7/16/2017  9:52 AM 
 
4. Enter the last four digits of your Social Security Number (xxxx) and Date of Birth (mm/dd/yyyy) as indicated and click Submit. You will be taken to the next sign-up page. 5. Create a [employer] ID. This will be your [employer] login ID and cannot be changed, so think of one that is secure and easy to remember. 6. Create a [employer] password. You can change your password at any time. 7. Enter your Password Reset Question and Answer. This can be used at a later time if you forget your password. 8. Enter your e-mail address. You will receive e-mail notification when new information is available in [address] 9. Click Sign Up. You can now view and download portions of your medical record. 10. Click the Download Summary menu link to download a portable copy of your medical information. If you have questions, please visit the Frequently Asked Questions section of the [employer] website. Remember, [employer] is NOT to be used for urgent needs. For medical emergencies, dial 911. Now available from your iPhone and Android! Please provide this summary of care documentation to your next provider. Your primary care clinician is listed as Ashwin Tellez. If you have any questions after today's visit, please call [phone].

## 2017-08-25 ENCOUNTER — OFFICE VISIT (OUTPATIENT)
Dept: FAMILY MEDICINE CLINIC | Age: 77
End: 2017-08-25

## 2017-08-25 VITALS
OXYGEN SATURATION: 99 % | BODY MASS INDEX: 29.04 KG/M2 | SYSTOLIC BLOOD PRESSURE: 145 MMHG | RESPIRATION RATE: 20 BRPM | DIASTOLIC BLOOD PRESSURE: 62 MMHG | HEART RATE: 65 BPM | WEIGHT: 153.8 LBS | HEIGHT: 61 IN | TEMPERATURE: 96.6 F

## 2017-08-25 DIAGNOSIS — I10 UNSPECIFIED ESSENTIAL HYPERTENSION: ICD-10-CM

## 2017-08-25 DIAGNOSIS — G62.9 NEUROPATHY: Primary | ICD-10-CM

## 2017-08-25 DIAGNOSIS — R73.01 IMPAIRED FASTING GLUCOSE: ICD-10-CM

## 2017-08-25 RX ORDER — PREGABALIN 75 MG/1
75 CAPSULE ORAL
Qty: 14 CAP | Refills: 0 | COMMUNITY
Start: 2017-08-25 | End: 2017-09-01

## 2017-08-25 NOTE — MR AVS SNAPSHOT
Visit Information Date & Time Provider Department Dept. Phone Encounter #  
 8/25/2017  1:30 PM Lalo Youngblood NP Demetrius Arvizu [id number] Follow-up Instructions Return in about 1 week (around 9/1/2017). Follow-up and Disposition History Your Appointments 9/1/2017 10:50 AM  
Follow Up with MATT Mazariegos Fox ([facility]) Appt Note: 1wk fu per [facility] [address] [facility],5[address] [facility] [address] [phone]  
  
   
 [address] [facility] [address] [place], [place] [address]ia [facility],5Th Floor [address] Upcoming Health Maintenance Date Due OSTEOPOROSIS SCREENING (DEXA) 8/25/2006 GLAUCOMA SCREENING Q2Y 6/12/2015 Pneumococcal 65+ High/Highest Risk (2 of 2 - PPSV23) 1/8/2016 INFLUENZA AGE 9 TO ADULT 8/1/2017 MEDICARE YEARLY EXAM 4/18/2018 DTaP/Tdap/Td series (2 - Td) 8/25/2027 Allergies as of 8/25/2017  Review Complete On: 8/25/2017 By: Lalo Youngblood NP Severity Noted Reaction Type Reactions Feldene [Piroxicam]  02/11/2016    Other (comments) Stomach problem Toradol [Ketorolac Tromethamine]  06/10/2013    Itching Voltaren [Diclofenac Sodium]  06/10/2013    Diarrhea Current Immunizations  Reviewed on 8/25/2017 Name Date Pneumococcal Conjugate (PCV-13) 11/13/2015 Pneumococcal Polysaccharide (PPSV-23) 10/29/2004 Zoster Vaccine, Live 1/31/2012 Reviewed by Marilyn Robles on 8/25/2017 at  1:33 PM  
You Were Diagnosed With   
  
 Codes Comments [facility]urop[facility] ([facility]    -  Primary ICD-10-CM: G62.9 ICD-9-CM: 355.9 Unspecified essential hypertension     ICD-10-CM: I10 
ICD-9-CM: 401.9 Impaired fasting glucose     ICD-10-CM: R73.01 
ICD-9-CM: 790.21 Vitals BP Pulse Temp Resp Height(growth percentile) Weight(growth percentile) 145/62 (BP 1 Location: Left arm, BP Patient Position: Sitting) 65 96.6 °F (35.9 °C) (Oral) 20 5' 1\" (1.549 m) 153 lb 12.8 oz (69.8 kg) SpO2 BMI OB Status Smoking Status 99% 29.06 kg/m2 Postmenopausal Former Smoker Vitals History BMI and BSA Data Body Mass Index Body Surface Area  
 29.06 kg/m 2 1.73 m 2 Preferred Pharmacy Pharmacy Name Phone [facility], [address] AT [facility]  3 & ALEXA Saunders [phone] Your Updated Medication List  
  
   
This list is accurate as of: 8/25/17  2:35 PM.  Always use your most recent med list.  
  
  
  
  
 acetaminophen-codeine 300-30 mg per tablet Commonly known as:  TYLENOL #3 Take 1 Tab by mouth two (2) times daily as needed for Pain. Max Daily Amount: 2 Tabs. amLODIPine 10 mg tablet Commonly known as:  Ha[place] Take 1 Tab by mouth daily. Indications: pressure  
  
 cholestyramine-sucrose 4 gram powder Commonly known as:  Nuris Hasting Take 4 g by mouth two (2) times daily (with meals). losartan-hydroCHLOROthiazide 100-12.5 mg per tablet Commonly known as:  HYZAAR Take 1 Tab by mouth daily. Indications: pressure  
  
 metoprolol tartrate 50 mg tablet Commonly known as:  LOPRESSOR Take 1 Tab by mouth two (2) times a day. Indications: pressure and heart  
  
 omeprazole 20 mg capsule Commonly known as:  PRILOSEC  
TAKE 1 CAP BY MOUTH TWO  TIMES A DAY. INDICATIONS:  PREVENTION OF NSAID-INDUCED GASTRIC ULCER  
  
 pregabalin 75 mg capsule Commonly known as:  Balinda Garb Take 1 Cap by mouth two (2) times daily as needed. Max Daily Amount: 150 mg. Indications: neuropathy  
  
 spironolactone 25 mg tablet Commonly known as:  ALDACTONE  
TAKE 1/2 TABLET BY MOUTH DAILY  
  
 TYLENOL ARTHRITIS PAIN 650 mg CR tablet Generic drug:  acetaminophen Take 650 mg by mouth every six (6) hours as needed for Pain. XANAX 0.5 mg tablet Generic drug:  ALPRAZolam  
Take 0.5 mg by mouth. 1/2 Pill PRN when needed  
  
 zolpidem 5 mg tablet Commonly known as:  AMBIEN  
 Take 1 Tab by mouth nightly as needed for Sleep. Max Daily Amount: 5 mg. Indications: Sleep We Performed the Following CBC WITH AUTOMATED DIFF [64315 CPT(R)] CK [id number] CPT(R)] HEMOGLOBIN A1C WITH EAG [29550 CPT(R)] LIPID PANEL [92486 CPT(R)] MAGNESIUM [id number] CPT(R)] METABOLIC PANEL, COMPREHENSIVE [18831 CPT(R)] TSH AND FREE T4 [69437 CPT(R)] Follow-up Instructions Return in about 1 week (around 9/1/2017). Patient Instructions Neuropathic Pain: Care Instructions Your Care Instructions Neuropathic pain is caused by pressure on or damage to your nerves. It's often simply called nerve pain. Some people feel this type of pain all the time. For others, it comes and goes. Diabetes, shingles, or an injury can cause nerve pain. Many people say the pain feels sharp, burning, or stabbing. But some people feel it as a dull ache. In some cases, it makes your skin very sensitive. So touch, pressure, and other sensations that did not hurt before may now cause pain. It's important to know that this kind of pain is real and can affect your quality of life. It's also important to know that treatment can help. Treatment includes pain medicines, exercise, and physical therapy. Medicines can help reduce the number of pain signals that travel over the nerves. This can make the painful areas less sensitive. It can also help you sleep better and improve your mood. But medicines are only one part of successful treatment. Most people do best with more than one kind of treatment. Your doctor may recommend that you try cognitive-behavioral therapy and stress management. Or, if needed, you may decide to try to quit smoking, lower your blood pressure, or better control blood sugar. These kinds of healthy changes can also make a difference. If you feel that your treatment is not working, talk to your doctor.  And be sure to tell your doctor if you think you might be depressed or anxious. These are common problems that can also be treated. Follow-up care is a key part of your treatment and safety. Be sure to make and go to all appointments, and call your doctor if you are having problems. It's also a good idea to know your test results and keep a list of the medicines you take. How can you care for yourself at home? · Be safe with medicines. Read and follow all instructions on the label. ¨ If the doctor gave you a prescription medicine for pain, take it as prescribed. ¨ If you are not taking a prescription pain medicine, ask your doctor if you can take an over-the-counter medicine. · Save hard tasks for days when you have less pain. Follow a hard task with an easy task. And remember to take breaks. · Relax, and reduce stress. You may want to try deep breathing or meditation. These can help. · Keep moving. Gentle, daily exercise can help reduce pain. Your doctor or physical therapist can tell you what type of exercise is best for you. This may include walking, swimming, and stationary biking. It may also include stretches and range-of-motion exercises. · Try heat, cold packs, and massage. · Get enough sleep. Constant pain can make you more tired. If the pain makes it hard to sleep, talk with your doctor. · Think positively. Your thoughts can affect your pain. Do fun things to distract yourself from the pain. See a movie, read a book, listen to music, or spend time with a friend. · Keep a pain diary. Try to write down how strong your pain is and what it feels like. Also try to notice and write down how your moods, thoughts, sleep, activities, and medicine affect your pain. These notes can help you and your doctor find the best ways to treat your pain. Reducing constipation caused by pain medicine Pain medicines often cause constipation. To reduce constipation: · Include fruits, vegetables, beans, and whole grains in your diet each day. These foods are high in fiber. · Drink plenty of fluids, enough so that your urine is light yellow or clear like water. If you have kidney, heart, or liver disease and have to limit fluids, talk with your doctor before you increase the amount of fluids you drink. · Get some exercise every day. Build up slowly to 30 to 60 minutes a day on 5 or more days of the week. · Take a fiber supplement, such as Citrucel or Metamucil, every day if needed. Read and follow all instructions on the label. · Schedule time each day for a bowel movement. Having a daily routine may help. Take your time and do not strain when having a bowel movement. · Ask your doctor about a laxative. The goal is to have one easy bowel movement every 1 to 2 days. Do not let constipation go untreated for more than 3 days. When should you call for help? Call your doctor now or seek immediate medical care if: 
· You feel sad, anxious, or hopeless for more than a few days. This could mean you are depressed. Depression is common in people who have a lot of pain. But it can be treated. · You have trouble with bowel movements, such as: 
¨ No bowel movement in 3 days. ¨ Blood in the anal area, in your stool, or on the toilet paper. ¨ Diarrhea for more than 24 hours. Watch closely for changes in your health, and be sure to contact your doctor if: 
· Your pain is getting worse. · You can't sleep because of pain. · You are very worried or anxious about your pain. · You have trouble taking your pain medicine. · You have any concerns about your pain medicine or its side effects. · You have vomiting or cramps for more than 2 hours. Where can you learn more? Go to http://gonzalo-donny.info/. Enter R123 in the search box to learn more about \"Neuropathic Pain: Care Instructions. \" Current as of: October 14, 2016 Content Version: 11.3 © 5158-3278 Healthwise, Incorporated. Care instructions adapted under license by [employer] (which disclaims liability or warranty for this information). If you have questions about a medical condition or this instruction, always ask your healthcare professional. [address]  any warranty or liability for your use of this information. If you have any questions regarding Kanjoya, you may call Kan[employer]a support at [phone]. If you have any questions regarding [employer], you may call Kan[employer] support at [phone]. Patient Instructions History Introducing [facility]! [address] introduces [employer] patient portal. Now you can access parts of your medical record, email your doctor's office, and request medication refills online. 1. In your internet browser, go to [email or web address] 2. Click on the First Time User? Click Here link in the Sign In box. You will see the New Member Sign Up page. 3. Enter your [employer] Access Code exactly as it appears below. You will not need to use this code after youve completed the sign-up process. If you do not sign up before the expiration date, you must request a new code. · [employer] Access Code: [email or web address] Expires: 11/23/2017  2:34 PM 
 
4. Enter the last four digits of your Social Security Number (xxxx) and Date of Birth (mm/dd/yyyy) as indicated and click Submit. You will be taken to the next sign-up page. 5. Create a [employer] ID. This will be your [employer] login ID and cannot be changed, so think of one that is secure and easy to remember. 6. Create a [employer] password. You can change your password at any time. 7. Enter your Password Reset Question and Answer. This can be used at a later time if you forget your password. 8. Enter your e-mail address. You will receive e-mail notification when new information is available in [address] 9. Click Sign Up. You can now view and download portions of your medical record. 10. Click the Download Summary menu link to download a portable copy of your medical information. If you have questions, please visit the Frequently Asked Questions section of the Widgetbox website. Remember, [employer] is NOT to be used for urgent needs. For medical emergencies, dial 911. Now available from your iPhone and Android! Please provide this summary of care documentation to your next provider. Your primary care clinician is listed as Pa Tellez. If you have any questions after today's visit, please call [phone].

## 2017-08-25 NOTE — PATIENT INSTRUCTIONS
Neuropathic Pain: Care Instructions  Your Care Instructions  Neuropathic pain is caused by pressure on or damage to your nerves. It's often simply called nerve pain. Some people feel this type of pain all the time. For others, it comes and goes. Diabetes, shingles, or an injury can cause nerve pain. Many people say the pain feels sharp, burning, or stabbing. But some people feel it as a dull ache. In some cases, it makes your skin very sensitive. So touch, pressure, and other sensations that did not hurt before may now cause pain. It's important to know that this kind of pain is real and can affect your quality of life. It's also important to know that treatment can help. Treatment includes pain medicines, exercise, and physical therapy. Medicines can help reduce the number of pain signals that travel over the nerves. This can make the painful areas less sensitive. It can also help you sleep better and improve your mood. But medicines are only one part of successful treatment. Most people do best with more than one kind of treatment. Your doctor may recommend that you try cognitive-behavioral therapy and stress management. Or, if needed, you may decide to try to quit smoking, lower your blood pressure, or better control blood sugar. These kinds of healthy changes can also make a difference. If you feel that your treatment is not working, talk to your doctor. And be sure to tell your doctor if you think you might be depressed or anxious. These are common problems that can also be treated. Follow-up care is a key part of your treatment and safety. Be sure to make and go to all appointments, and call your doctor if you are having problems. It's also a good idea to know your test results and keep a list of the medicines you take. How can you care for yourself at home? · Be safe with medicines. Read and follow all instructions on the label.   ¨ If the doctor gave you a prescription medicine for pain, take it as prescribed. ¨ If you are not taking a prescription pain medicine, ask your doctor if you can take an over-the-counter medicine. · Save hard tasks for days when you have less pain. Follow a hard task with an easy task. And remember to take breaks. · Relax, and reduce stress. You may want to try deep breathing or meditation. These can help. · Keep moving. Gentle, daily exercise can help reduce pain. Your doctor or physical therapist can tell you what type of exercise is best for you. This may include walking, swimming, and stationary biking. It may also include stretches and range-of-motion exercises. · Try heat, cold packs, and massage. · Get enough sleep. Constant pain can make you more tired. If the pain makes it hard to sleep, talk with your doctor. · Think positively. Your thoughts can affect your pain. Do fun things to distract yourself from the pain. See a movie, read a book, listen to music, or spend time with a friend. · Keep a pain diary. Try to write down how strong your pain is and what it feels like. Also try to notice and write down how your moods, thoughts, sleep, activities, and medicine affect your pain. These notes can help you and your doctor find the best ways to treat your pain. Reducing constipation caused by pain medicine  Pain medicines often cause constipation. To reduce constipation:  · Include fruits, vegetables, beans, and whole grains in your diet each day. These foods are high in fiber. · Drink plenty of fluids, enough so that your urine is light yellow or clear like water. If you have kidney, heart, or liver disease and have to limit fluids, talk with your doctor before you increase the amount of fluids you drink. · Get some exercise every day. Build up slowly to 30 to 60 minutes a day on 5 or more days of the week. · Take a fiber supplement, such as Citrucel or Metamucil, every day if needed. Read and follow all instructions on the label.   · Schedule time each day for a bowel movement. Having a daily routine may help. Take your time and do not strain when having a bowel movement. · Ask your doctor about a laxative. The goal is to have one easy bowel movement every 1 to 2 days. Do not let constipation go untreated for more than 3 days. When should you call for help? Call your doctor now or seek immediate medical care if:  · You feel sad, anxious, or hopeless for more than a few days. This could mean you are depressed. Depression is common in people who have a lot of pain. But it can be treated. · You have trouble with bowel movements, such as:  ¨ No bowel movement in 3 days. ¨ Blood in the anal area, in your stool, or on the toilet paper. ¨ Diarrhea for more than 24 hours. Watch closely for changes in your health, and be sure to contact your doctor if:  · Your pain is getting worse. · You can't sleep because of pain. · You are very worried or anxious about your pain. · You have trouble taking your pain medicine. · You have any concerns about your pain medicine or its side effects. · You have vomiting or cramps for more than 2 hours. Where can you learn more? Go to http://gonzalo-donny.info/. Enter R626 in the search box to learn more about \"Neuropathic Pain: Care Instructions. \"  Current as of: October 14, 2016  Content Version: 11.3  © 8119-5325 [employer]Barn. Care instructions adapted under license by Intro[employer]ive (which disclaims liability or warranty for this information). If you have questions about a medical condition or this instruction, always ask your healthcare professional. [place] [address] any warranty or liability for your use of this information. If you have any questions regarding PokitDokt, you may call atHomestars support at [phone]. If you have any questions regarding PokitDokt, you may call atHomestars support at [phone].

## 2017-08-25 NOTE — PROGRESS NOTES
Chief Complaint   Patient presents with    Ankle Pain     right ankle swelling/pain. HPI:      Blanche Maldonado is a 69 y.o. female. HTN  BP's ok lately. Remains on norvasc, hyzaar, aldactone, and metoprolol. Taking regularly    Hyperlipidemia. On questran and benefiber. Aren well. No myalgias, arthralgias, unusual weakness. LBP  Has been doing better since last visit on aleve. Takes Prilosec daily for gastric ulcer prevention. Hx ovarian cancer. Doing well on questran at BID. Last Ca125 was ok last check    Insomnia  Doing well on ambien 5mg QHS. Neuropathy  Was on 75 mg of Lyrica once per day but stopped taking it because she did not feel as though it was working. .  Symptoms are not controlled. Having severe pain of the left foot > right. Having the feeling of \"pins and needles\". Allergies   Allergen Reactions    Feldene [Piroxicam] Other (comments)     Stomach problem    Toradol [Ketorolac Tromethamine] Itching    Voltaren [Diclofenac Sodium] Diarrhea       Current Outpatient Prescriptions   Medication Sig    pregabalin (LYRICA) 75 mg capsule Take 1 Cap by mouth two (2) times daily as needed. Max Daily Amount: 150 mg. Indications: neuropathy    spironolactone (ALDACTONE) 25 mg tablet TAKE 1/2 TABLET BY MOUTH DAILY    zolpidem (AMBIEN) 5 mg tablet Take 1 Tab by mouth nightly as needed for Sleep. Max Daily Amount: 5 mg. Indications: Sleep    omeprazole (PRILOSEC) 20 mg capsule TAKE 1 CAP BY MOUTH TWO  TIMES A DAY. INDICATIONS:  PREVENTION OF NSAID-INDUCED GASTRIC ULCER    losartan-hydroCHLOROthiazide (HYZAAR) 100-12.5 mg per tablet Take 1 Tab by mouth daily. Indications: pressure    metoprolol tartrate (LOPRESSOR) 50 mg tablet Take 1 Tab by mouth two (2) times a day. Indications: pressure and heart    amLODIPine (NORVASC) 10 mg tablet Take 1 Tab by mouth daily.  Indications: pressure    acetaminophen (TYLENOL ARTHRITIS PAIN) 650 mg CR tablet Take 650 mg by mouth every six (6) hours as needed for Pain.  acetaminophen-codeine (TYLENOL #3) 300-30 mg per tablet Take 1 Tab by mouth two (2) times daily as needed for Pain. Max Daily Amount: 2 Tabs.  cholestyramine-sucrose (QUESTRAN) 4 gram powder Take 4 g by mouth two (2) times daily (with meals).  ALPRAZolam (XANAX) 0.5 mg tablet Take 0.5 mg by mouth. 1/2 Pill PRN when needed     No current facility-administered medications for this visit. Past Medical History:   Diagnosis Date    Cancer ([facility]     ovarian    [facility]    Cat scratch fever 09/91    Cervical prolapse 2011    Grade II    Chicken pox 1962    Contact dermatitis and other eczema, due to unspecified cause     Cystocele 2011    grade I    Diverticulosis     1985    Duodenal ulcer     Eye disorder 06/95    recurrent erosion of L eye    GERD (gastroesophageal reflux disease)     [place] measles 1964    Hypercholesterolemia     Hypertension     Menarche     onset at age 16    Menopause     onset at age 47    Neuropathy ([facility]     Ovarian cancer ([facility] 07/03    Poorly diff.     Postmenopausal vaginal bleeding 9/19/2013    Due to Granulation tissue from her pessary, treated w/ silver nitrate stick on 9.19.2013    Presence of pessary     #2    PARUL (stress urinary incontinence, female)        Past Surgical History:   Procedure Laterality Date    HX APPENDECTOMY      HX COLECTOMY      (Everlena Hamming)    HX DILATION AND CURETTAGE  1963    colonization    HX SALPINGO-OOPHORECTOMY  7/03    Exploratory Lap, omentectomy, tumor debulking Celestia Body)    HX TONSILLECTOMY  1964       Social History     Social History    Marital status: UNKNOWN     Spouse name: N/A    Number of children: 0    Years of education: N/A     Occupational History    front office, coding Retired     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 Lanes, [employer] [facility] [place]     Social History Main Topics    Smoking status: Former Smoker    Smokeless tobacco: Never Used    Alcohol use 0.0 oz/week     0 Standard drinks or equivalent per week      Comment: minimal    Drug use: No    Sexual activity: No     Other Topics Concern     Service No    Blood Transfusions No    Caffeine Concern No    Occupational Exposure No    Hobby Hazards No    Sleep Concern No    Stress Concern No    Weight Concern No    Special Diet No    Back Care No    Exercise No    Bike Helmet No    Seat Belt Yes    Self-Exams No     Social History Narrative       Family History   Problem Relation Age of Onset    Stroke Mother     Heart Disease Father        Above history reviewed. ROS:  Denies fever, chills, cough, chest pain, SOB,  nausea, vomiting, or diarrhea. Denies wt loss, wt gain, hemoptysis, hematochezia or melena. Physical Examination:    /62 (BP 1 Location: Left arm, BP Patient Position: Sitting)  Pulse 65  Temp 96.6 °F (35.9 °C) (Oral)   Resp 20  Ht 5' 1\" (1.549 m)  Wt 153 lb 12.8 oz (69.8 kg)  SpO2 99%  BMI 29.06 kg/m2    General: Alert and Ox3, Fluent speech  Neck:  Supple, no adenopathy, JVD, mass or bruit  Chest:  Clear to Ausculation, without wheezes, rales, rubs or ronchi  Cardiac: RRR  Extremities:  No cyanosis, clubbing or edema  Neurologic:  Ambulatory without assist, CN 2-12 grossly intact. Moves all extremities. Skin: no rash  Lymphadenopathy: no cervical or supraclavicular nodes    ASSESSMENT AND PLAN:     1. Neuropathy (HCC)  Restarting Lyrica. Previously was only taking 75 mg once per day. - MAGNESIUM  - CK  - pregabalin (LYRICA) 75 mg capsule; Take 1 Cap by mouth two (2) times daily as needed. Max Daily Amount: 150 mg. Indications: neuropathy  Dispense: 14 Cap; Refill: 0    2. Unspecified essential hypertension  Home log shows good control.   - TSH AND FREE T4  - MAGNESIUM  - CK  - LIPID PANEL  - CBC WITH AUTOMATED DIFF  - METABOLIC PANEL, COMPREHENSIVE    3.  Impaired fasting glucose  Screening  - HEMOGLOBIN A1C WITH EAG     RTC in 1 week    Tiera Brunner NP

## 2017-08-26 LAB
ALBUMIN SERPL-MCNC: 4.1 G/DL (ref 3.5–4.8)
ALBUMIN/GLOB SERPL: 2 {RATIO} (ref 1.2–2.2)
ALP SERPL-CCNC: 66 IU/L (ref 39–117)
ALT SERPL-CCNC: 30 IU/L (ref 0–32)
AST SERPL-CCNC: 27 IU/L (ref 0–40)
BASOPHILS # BLD AUTO: 0 X10E3/UL (ref 0–0.2)
BASOPHILS NFR BLD AUTO: 0 %
BILIRUB SERPL-MCNC: 0.2 MG/DL (ref 0–1.2)
BUN SERPL-MCNC: 10 MG/DL (ref 8–27)
BUN/CREAT SERPL: 12 (ref 12–28)
CALCIUM SERPL-MCNC: 8.9 MG/DL (ref 8.7–10.3)
CHLORIDE SERPL-SCNC: 94 MMOL/L (ref 96–106)
CHOLEST SERPL-MCNC: 179 MG/DL (ref 100–199)
CK SERPL-CCNC: 58 U/L (ref 24–173)
CO2 SERPL-SCNC: 23 MMOL/L (ref 18–29)
CREAT SERPL-MCNC: 0.81 MG/DL (ref 0.57–1)
EOSINOPHIL # BLD AUTO: 0.2 X10E3/UL (ref 0–0.4)
EOSINOPHIL NFR BLD AUTO: 2 %
ERYTHROCYTE [DISTWIDTH] IN BLOOD BY AUTOMATED COUNT: 14.9 % (ref 12.3–15.4)
EST. AVERAGE GLUCOSE BLD GHB EST-MCNC: 117 MG/DL
GLOBULIN SER CALC-MCNC: 2.1 G/DL (ref 1.5–4.5)
GLUCOSE SERPL-MCNC: 111 MG/DL (ref 65–99)
HBA1C MFR BLD: 5.7 % (ref 4.8–5.6)
HCT VFR BLD AUTO: 37.9 % (ref 34–46.6)
HDLC SERPL-MCNC: 52 MG/DL
HGB BLD-MCNC: 12.5 G/DL (ref 11.1–15.9)
IMM GRANULOCYTES # BLD: 0 X10E3/UL (ref 0–0.1)
IMM GRANULOCYTES NFR BLD: 0 %
LDLC SERPL CALC-MCNC: ABNORMAL MG/DL (ref 0–99)
LYMPHOCYTES # BLD AUTO: 3.1 X10E3/UL (ref 0.7–3.1)
LYMPHOCYTES NFR BLD AUTO: 46 %
MAGNESIUM SERPL-MCNC: 1.8 MG/DL (ref 1.6–2.3)
MCH RBC QN AUTO: 28.5 PG (ref 26.6–33)
MCHC RBC AUTO-ENTMCNC: 33 G/DL (ref 31.5–35.7)
MCV RBC AUTO: 87 FL (ref 79–97)
MONOCYTES # BLD AUTO: 0.6 X10E3/UL (ref 0.1–0.9)
MONOCYTES NFR BLD AUTO: 9 %
NEUTROPHILS # BLD AUTO: 2.9 X10E3/UL (ref 1.4–7)
NEUTROPHILS NFR BLD AUTO: 43 %
PLATELET # BLD AUTO: 275 X10E3/UL (ref 150–379)
POTASSIUM SERPL-SCNC: 4.6 MMOL/L (ref 3.5–5.2)
PROT SERPL-MCNC: 6.2 G/DL (ref 6–8.5)
RBC # BLD AUTO: 4.38 X10E6/UL (ref 3.77–5.28)
SODIUM SERPL-SCNC: 133 MMOL/L (ref 134–144)
T4 FREE SERPL-MCNC: 1.42 NG/DL (ref 0.82–1.77)
TRIGL SERPL-MCNC: 430 MG/DL (ref 0–149)
TSH SERPL DL<=0.005 MIU/L-ACNC: 3.11 UIU/ML (ref 0.45–4.5)
VLDLC SERPL CALC-MCNC: ABNORMAL MG/DL (ref 5–40)
WBC # BLD AUTO: 6.7 X10E3/UL (ref 3.4–10.8)

## 2017-08-28 DIAGNOSIS — E78.1 HYPERTRIGLYCERIDEMIA: ICD-10-CM

## 2017-08-28 DIAGNOSIS — E78.1 HYPERTRIGLYCERIDEMIA: Primary | ICD-10-CM

## 2017-08-28 RX ORDER — ATORVASTATIN CALCIUM 20 MG/1
20 TABLET, FILM COATED ORAL DAILY
Qty: 30 TAB | Refills: 5 | Status: SHIPPED | OUTPATIENT
Start: 2017-08-28 | End: 2017-08-28 | Stop reason: SDUPTHER

## 2017-08-28 RX ORDER — ATORVASTATIN CALCIUM 20 MG/1
TABLET, FILM COATED ORAL
Qty: 90 TAB | Refills: 5 | Status: SHIPPED | OUTPATIENT
Start: 2017-08-28 | End: 2017-11-24 | Stop reason: SDUPTHER

## 2017-08-28 NOTE — PROGRESS NOTES
Hgb A1c is good (5.7), thyroid level good, magnesium level and CK normal, blood count OK, kidneys and liver doing OK. Triglycerides are more elevated. Starting on a statin.

## 2017-09-01 ENCOUNTER — OFFICE VISIT (OUTPATIENT)
Dept: FAMILY MEDICINE CLINIC | Age: 77
End: 2017-09-01

## 2017-09-01 VITALS
SYSTOLIC BLOOD PRESSURE: 145 MMHG | RESPIRATION RATE: 19 BRPM | OXYGEN SATURATION: 100 % | WEIGHT: 154 LBS | BODY MASS INDEX: 29.1 KG/M2 | TEMPERATURE: 98 F | DIASTOLIC BLOOD PRESSURE: 65 MMHG | HEART RATE: 67 BPM

## 2017-09-01 DIAGNOSIS — G62.9 NEUROPATHY: Primary | ICD-10-CM

## 2017-09-01 DIAGNOSIS — Z85.43 HISTORY OF OVARIAN CANCER: ICD-10-CM

## 2017-09-01 DIAGNOSIS — M79.2 NEUROPATHIC PAIN: Primary | ICD-10-CM

## 2017-09-01 RX ORDER — GABAPENTIN 300 MG/1
300 CAPSULE ORAL 3 TIMES DAILY
Qty: 90 CAP | Refills: 2 | Status: SHIPPED | OUTPATIENT
Start: 2017-09-01 | End: 2017-12-05

## 2017-09-01 NOTE — PATIENT INSTRUCTIONS
Neuropathic Pain: Care Instructions  Your Care Instructions  Neuropathic pain is caused by pressure on or damage to your nerves. It's often simply called nerve pain. Some people feel this type of pain all the time. For others, it comes and goes. Diabetes, shingles, or an injury can cause nerve pain. Many people say the pain feels sharp, burning, or stabbing. But some people feel it as a dull ache. In some cases, it makes your skin very sensitive. So touch, pressure, and other sensations that did not hurt before may now cause pain. It's important to know that this kind of pain is real and can affect your quality of life. It's also important to know that treatment can help. Treatment includes pain medicines, exercise, and physical therapy. Medicines can help reduce the number of pain signals that travel over the nerves. This can make the painful areas less sensitive. It can also help you sleep better and improve your mood. But medicines are only one part of successful treatment. Most people do best with more than one kind of treatment. Your doctor may recommend that you try cognitive-behavioral therapy and stress management. Or, if needed, you may decide to try to quit smoking, lower your blood pressure, or better control blood sugar. These kinds of healthy changes can also make a difference. If you feel that your treatment is not working, talk to your doctor. And be sure to tell your doctor if you think you might be depressed or anxious. These are common problems that can also be treated. Follow-up care is a key part of your treatment and safety. Be sure to make and go to all appointments, and call your doctor if you are having problems. It's also a good idea to know your test results and keep a list of the medicines you take. How can you care for yourself at home? · Be safe with medicines. Read and follow all instructions on the label.   ¨ If the doctor gave you a prescription medicine for pain, take it as prescribed. ¨ If you are not taking a prescription pain medicine, ask your doctor if you can take an over-the-counter medicine. · Save hard tasks for days when you have less pain. Follow a hard task with an easy task. And remember to take breaks. · Relax, and reduce stress. You may want to try deep breathing or meditation. These can help. · Keep moving. Gentle, daily exercise can help reduce pain. Your doctor or physical therapist can tell you what type of exercise is best for you. This may include walking, swimming, and stationary biking. It may also include stretches and range-of-motion exercises. · Try heat, cold packs, and massage. · Get enough sleep. Constant pain can make you more tired. If the pain makes it hard to sleep, talk with your doctor. · Think positively. Your thoughts can affect your pain. Do fun things to distract yourself from the pain. See a movie, read a book, listen to music, or spend time with a friend. · Keep a pain diary. Try to write down how strong your pain is and what it feels like. Also try to notice and write down how your moods, thoughts, sleep, activities, and medicine affect your pain. These notes can help you and your doctor find the best ways to treat your pain. Reducing constipation caused by pain medicine  Pain medicines often cause constipation. To reduce constipation:  · Include fruits, vegetables, beans, and whole grains in your diet each day. These foods are high in fiber. · Drink plenty of fluids, enough so that your urine is light yellow or clear like water. If you have kidney, heart, or liver disease and have to limit fluids, talk with your doctor before you increase the amount of fluids you drink. · Get some exercise every day. Build up slowly to 30 to 60 minutes a day on 5 or more days of the week. · Take a fiber supplement, such as Citrucel or Metamucil, every day if needed. Read and follow all instructions on the label.   · Schedule time each day for a bowel movement. Having a daily routine may help. Take your time and do not strain when having a bowel movement. · Ask your doctor about a laxative. The goal is to have one easy bowel movement every 1 to 2 days. Do not let constipation go untreated for more than 3 days. When should you call for help? Call your doctor now or seek immediate medical care if:  · You feel sad, anxious, or hopeless for more than a few days. This could mean you are depressed. Depression is common in people who have a lot of pain. But it can be treated. · You have trouble with bowel movements, such as:  ¨ No bowel movement in 3 days. ¨ Blood in the anal area, in your stool, or on the toilet paper. ¨ Diarrhea for more than 24 hours. Watch closely for changes in your health, and be sure to contact your doctor if:  · Your pain is getting worse. · You can't sleep because of pain. · You are very worried or anxious about your pain. · You have trouble taking your pain medicine. · You have any concerns about your pain medicine or its side effects. · You have vomiting or cramps for more than 2 hours. Where can you learn more? Go to http://gonzalo-donny.info/. Enter R305 in the search box to learn more about \"Neuropathic Pain: Care Instructions. \"  Current as of: October 14, 2016  Content Version: 11.3  © 8783-0170 [employer]. Care instructions adapted under license by [employer] (which disclaims liability or warranty for this information). If you have questions about a medical condition or this instruction, always ask your healthcare professional. [place] [address] any warranty or liability for your use of this information.

## 2017-09-01 NOTE — MR AVS SNAPSHOT
Visit Information Date & Time Provider Department Dept. Phone Encounter #  
 9/1/2017 10:50 AM Francisco Almaguer NP [address] [place] [address] [id number] Follow-up Instructions Return in about 3 months (around 12/15/2017). Follow-up and Disposition History Your Appointments 12/5/2017  9:30 AM  
ESTABLISHED PATIENT with Francisco Almaguer NP  
Noreen Braxton ([address] [facility]) Appt Note: 3mo fu  
 [address] [facility] [address] [facility],[address] [address] [phone]  
  
   
 [address] [facility] [address] [facility],[address] [address] Upcoming Health Maintenance Date Due OSTEOPOROSIS SCREENING (DEXA) 8/25/2006 GLAUCOMA SCREENING Q2Y 6/12/2015 MEDICARE YEARLY EXAM 4/18/2018 DTaP/Tdap/Td series (2 - Td) 8/25/2027 Allergies as of 9/1/2017  Review Complete On: 9/1/2017 By: Francisco Almaguer NP Severity Noted Reaction Type Reactions Feldene [Piroxicam]  02/11/2016    Other (comments) Stomach problem Toradol [Ketorolac Tromethamine]  06/10/2013    Itching Voltaren [Diclofenac Sodium]  06/10/2013    Diarrhea Current Immunizations  Reviewed on 9/1/2017 Name Date Pneumococcal Conjugate (PCV-13) 11/13/2015 Pneumococcal Polysaccharide (PPSV-23) 10/29/2004 Zoster Vaccine, Live 1/31/2012 Reviewed by Daniel Knight on 9/1/2017 at 11:04 AM  
You Were Diagnosed With   
  
 Codes Comments [facility]    -  Primary ICD-10-CM: G62.9 ICD-9-CM: 355.9 History of ovarian cancer     ICD-10-CM: Z85.43 
ICD-9-CM: V10.43 Vitals BP Pulse Temp Resp Weight(growth percentile) SpO2  
 145/65 (BP 1 Location: Left arm, BP Patient Position: Sitting) 67 98 °F (36.7 °C) (Oral) 19 154 lb (69.9 kg) 100% BMI OB Status Smoking Status 29.1 kg/m2 Postmenopausal Former Smoker Vitals History BMI and BSA Data Body Mass Index Body Surface Area  
 29.1 kg/m 2 1.73 m 2 Preferred Pharmacy Pharmacy Name Phone [address] [facility], [address] [place] [address] [address] Tiffanie Zuniga Your Updated Medication List  
  
   
This list is accurate as of: 9/1/17 12:07 PM.  Always use your most recent med list.  
  
  
  
  
 acetaminophen-codeine 300-30 mg per tablet Commonly known as:  TYLENOL #3 Take 1 Tab by mouth two (2) times daily as needed for Pain. Max Daily Amount: 2 Tabs. amLODIPine 10 mg tablet Commonly known as:  Yari Sujit TAKE 1 TABLET BY MOUTH  DAILY. INDICATIONS:  PRESSURE  
  
 atorvastatin 20 mg tablet Commonly known as:  LIPITOR  
TAKE 1 TABLET BY MOUTH DAILY  
  
 cholestyramine-sucrose 4 gram powder Commonly known as:  Brian Alpha Take 4 g by mouth two (2) times daily (with meals). losartan-hydroCHLOROthiazide 100-12.5 mg per tablet Commonly known as:  HYZAAR  
TAKE 1 TABLET BY MOUTH  DAILY. INDICATIONS:  PRESSURE  
  
 metoprolol tartrate 50 mg tablet Commonly known as:  LOPRESSOR  
TAKE 1 TABLET BY MOUTH TWO  TIMES A DAY. INDICATIONS:  PRESSURE AND HEART  
  
 omeprazole 20 mg capsule Commonly known as:  PRILOSEC  
TAKE 1 CAP BY MOUTH TWO  TIMES A DAY. INDICATIONS:  PREVENTION OF NSAID-INDUCED GASTRIC ULCER  
  
 pregabalin 75 mg capsule Commonly known as:  Waylon Mola Take 1 Cap by mouth two (2) times daily as needed. Max Daily Amount: 150 mg. Indications: neuropathy  
  
 spironolactone 25 mg tablet Commonly known as:  ALDACTONE  
TAKE 1/2 TABLET BY MOUTH DAILY  
  
 TYLENOL ARTHRITIS PAIN 650 mg CR tablet Generic drug:  acetaminophen Take 650 mg by mouth every six (6) hours as needed for Pain. XANAX 0.5 mg tablet Generic drug:  ALPRAZolam  
Take 0.5 mg by mouth. 1/2 Pill PRN when needed  
  
 zolpidem 5 mg tablet Commonly known as:  AMBIEN Take 1 Tab by mouth nightly as needed for Sleep. Max Daily Amount: 5 mg. Indications: Sleep Follow-up Instructions Return in about 3 months (around 12/15/2017). Patient Instructions Neuropathic Pain: Care Instructions Your Care Instructions Neuropathic pain is caused by pressure on or damage to your nerves. It's often simply called nerve pain. Some people feel this type of pain all the time. For others, it comes and goes. Diabetes, shingles, or an injury can cause nerve pain. Many people say the pain feels sharp, burning, or stabbing. But some people feel it as a dull ache. In some cases, it makes your skin very sensitive. So touch, pressure, and other sensations that did not hurt before may now cause pain. It's important to know that this kind of pain is real and can affect your quality of life. It's also important to know that treatment can help. Treatment includes pain medicines, exercise, and physical therapy. Medicines can help reduce the number of pain signals that travel over the nerves. This can make the painful areas less sensitive. It can also help you sleep better and improve your mood. But medicines are only one part of successful treatment. Most people do best with more than one kind of treatment. Your doctor may recommend that you try cognitive-behavioral therapy and stress management. Or, if needed, you may decide to try to quit smoking, lower your blood pressure, or better control blood sugar. These kinds of healthy changes can also make a difference. If you feel that your treatment is not working, talk to your doctor. And be sure to tell your doctor if you think you might be depressed or anxious. These are common problems that can also be treated. Follow-up care is a key part of your treatment and safety. Be sure to make and go to all appointments, and call your doctor if you are having problems. It's also a good idea to know your test results and keep a list of the medicines you take. How can you care for yourself at home? · Be safe with medicines. Read and follow all instructions on the label.  
¨ If the doctor gave you a prescription medicine for pain, take it as prescribed. ¨ If you are not taking a prescription pain medicine, ask your doctor if you can take an over-the-counter medicine. · Save hard tasks for days when you have less pain. Follow a hard task with an easy task. And remember to take breaks. · Relax, and reduce stress. You may want to try deep breathing or meditation. These can help. · Keep moving. Gentle, daily exercise can help reduce pain. Your doctor or physical therapist can tell you what type of exercise is best for you. This may include walking, swimming, and stationary biking. It may also include stretches and range-of-motion exercises. · Try heat, cold packs, and massage. · Get enough sleep. Constant pain can make you more tired. If the pain makes it hard to sleep, talk with your doctor. · Think positively. Your thoughts can affect your pain. Do fun things to distract yourself from the pain. See a movie, read a book, listen to music, or spend time with a friend. · Keep a pain diary. Try to write down how strong your pain is and what it feels like. Also try to notice and write down how your moods, thoughts, sleep, activities, and medicine affect your pain. These notes can help you and your doctor find the best ways to treat your pain. Reducing constipation caused by pain medicine Pain medicines often cause constipation. To reduce constipation: 
· Include fruits, vegetables, beans, and whole grains in your diet each day. These foods are high in fiber. · Drink plenty of fluids, enough so that your urine is light yellow or clear like water. If you have kidney, heart, or liver disease and have to limit fluids, talk with your doctor before you increase the amount of fluids you drink. · Get some exercise every day. Build up slowly to 30 to 60 minutes a day on 5 or more days of the week. · Take a fiber supplement, such as Citrucel or Metamucil, every day if needed. Read and follow all instructions on the label. · Schedule time each day for a bowel movement. Having a daily routine may help. Take your time and do not strain when having a bowel movement. · Ask your doctor about a laxative. The goal is to have one easy bowel movement every 1 to 2 days. Do not let constipation go untreated for more than 3 days. When should you call for help? Call your doctor now or seek immediate medical care if: 
· You feel sad, anxious, or hopeless for more than a few days. This could mean you are depressed. Depression is common in people who have a lot of pain. But it can be treated. · You have trouble with bowel movements, such as: 
¨ No bowel movement in 3 days. ¨ Blood in the anal area, in your stool, or on the toilet paper. ¨ Diarrhea for more than 24 hours. Watch closely for changes in your health, and be sure to contact your doctor if: 
· Your pain is getting worse. · You can't sleep because of pain. · You are very worried or anxious about your pain. · You have trouble taking your pain medicine. · You have any concerns about your pain medicine or its side effects. · You have vomiting or cramps for more than 2 hours. Where can you learn more? Go to http://gonzalo-donny.info/. Enter E214 in the search box to learn more about \"Neuropathic Pain: Care Instructions. \" Current as of: October 14, 2016 Content Version: 11.3 © 0713-1551 [employer]. Care instructions adapted under license by [employer] (which disclaims liability or warranty for this information). If you have questions about a medical condition or this instruction, always ask your healthcare professional. [address] any warranty or liability for your use of this information. Introducing [facility]! [place] [email or web address] introduces Spotsetter patient portal. Now you can access parts of your medical record, email your doctor's office, and request medication refills online. 1. In your internet browser, go to [email or web address] 2. Click on the First Time User? Click Here link in the Sign In box. You will see the New Member Sign Up page. 3. Enter your Outline Access Code exactly as it appears below. You will not need to use this code after youve completed the sign-up process. If you do not sign up before the expiration date, you must request a new code. · Outline Access Code: [email or web address] Expires: 11/23/2017  2:34 PM 
 
4. Enter the last four digits of your Social Security Number (xxxx) and Date of Birth (mm/dd/yyyy) as indicated and click Submit. You will be taken to the next sign-up page. 5. Create a Trademarkiat ID. This will be your Outline login ID and cannot be changed, so think of one that is secure and easy to remember. 6. Create a Outline password. You can change your password at any time. 7. Enter your Password Reset Question and Answer. This can be used at a later time if you forget your password. 8. Enter your e-mail address. You will receive e-mail notification when new information is available in [address] 9. Click Sign Up. You can now view and download portions of your medical record. 10. Click the Download Summary menu link to download a portable copy of your medical information. If you have questions, please visit the Frequently Asked Questions section of the Outline website. Remember, Outline is NOT to be used for urgent needs. For medical emergencies, dial 911. Now available from your iPhone and Android! Please provide this summary of care documentation to your next provider. Your primary care clinician is listed as Bette Tellez. If you have any questions after today's visit, please call [phone].

## 2017-09-01 NOTE — PROGRESS NOTES
Chief Complaint   Patient presents with    Ankle Pain     right ankle          HPI:      Lashell Winhcester is a 69 y.o. female. HTN  BP's ok lately. Remains on norvasc, hyzaar, aldactone, and metoprolol. Taking regularly     Hyperlipidemia. On questran and benefiber. Aren well. No myalgias, arthralgias, unusual weakness. LBP  Has been doing better since last visit on aleve. Takes Prilosec daily for gastric ulcer prevention. Hx ovarian cancer. Doing well on questran at BID. Last Ca125 was ok last check     Insomnia  Doing well on ambien 5mg QHS. Neuropathy  Was on 75 mg of Lyrica once per day but stopped taking it because she did not feel as though it was working. .  Symptoms are not controlled. Having severe pain of the left foot > right. Having the feeling of \"pins and needles\". Restarted Lyrica last week    New Issues:  Her neuropathy seems a little better, but is still not great. She wants to see if Lyrica is covered better by her insurance company than previously. Her ankle and hip have been sore, but she has not taken anything. Allergies   Allergen Reactions    Feldene [Piroxicam] Other (comments)     Stomach problem    Toradol [Ketorolac Tromethamine] Itching    Voltaren [Diclofenac Sodium] Diarrhea       Current Outpatient Prescriptions   Medication Sig    amLODIPine (NORVASC) 10 mg tablet TAKE 1 TABLET BY MOUTH  DAILY. INDICATIONS:  PRESSURE    metoprolol tartrate (LOPRESSOR) 50 mg tablet TAKE 1 TABLET BY MOUTH TWO  TIMES A DAY. INDICATIONS:  PRESSURE AND HEART    losartan-hydroCHLOROthiazide (HYZAAR) 100-12.5 mg per tablet TAKE 1 TABLET BY MOUTH  DAILY. INDICATIONS:  PRESSURE    atorvastatin (LIPITOR) 20 mg tablet TAKE 1 TABLET BY MOUTH DAILY    pregabalin (LYRICA) 75 mg capsule Take 1 Cap by mouth two (2) times daily as needed. Max Daily Amount: 150 mg.  Indications: neuropathy    spironolactone (ALDACTONE) 25 mg tablet TAKE 1/2 TABLET BY MOUTH DAILY    zolpidem (AMBIEN) 5 mg tablet Take 1 Tab by mouth nightly as needed for Sleep. Max Daily Amount: 5 mg. Indications: Sleep    omeprazole (PRILOSEC) 20 mg capsule TAKE 1 CAP BY MOUTH TWO  TIMES A DAY. INDICATIONS:  PREVENTION OF NSAID-INDUCED GASTRIC ULCER    acetaminophen (TYLENOL ARTHRITIS PAIN) 650 mg CR tablet Take 650 mg by mouth every six (6) hours as needed for Pain.  acetaminophen-codeine (TYLENOL #3) 300-30 mg per tablet Take 1 Tab by mouth two (2) times daily as needed for Pain. Max Daily Amount: 2 Tabs.  cholestyramine-sucrose (QUESTRAN) 4 gram powder Take 4 g by mouth two (2) times daily (with meals).  ALPRAZolam (XANAX) 0.5 mg tablet Take 0.5 mg by mouth. 1/2 Pill PRN when needed     No current facility-administered medications for this visit. Past Medical History:   Diagnosis Date    [facility]     ovarian    [facility]    Cat scratch fever 09/91    Cervical prolapse 2011    Grade II    Chicken pox 1962    Contact dermatitis and other eczema, due to unspecified cause     Cystocele 2011    grade I    Diverticulosis     1985    Duodenal ulcer     Eye disorder 06/95    recurrent erosion of L eye    GERD (gastroesophageal reflux disease)     [facility] measles 1964    Hypercholesterolemia     Hypertension     Menarche     onset at age 16    Menopause     onset at age 47    [facility]urop[facility] ([facility]     [facility]    Poorly diff.     Postmenopausal vaginal bleeding 9/19/2013    Due to Granulation tissue from her pessary, treated w/ silver nitrate stick on 9.19.2013    Presence of pessary     #2    PARUL (stress urinary incontinence, female)        Past Surgical History:   Procedure Laterality Date    HX APPENDECTOMY      HX COLECTOMY      (Diandraa Ruthann)    HX DILATION AND CURETTAGE  1963    colonization    HX SALPINGO-OOPHORECTOMY  7/03    Exploratory Lap, omentectomy, tumor debulking Elian Monaco)    HX TONSILLECTOMY  1964       Social History     Social History    Marital status: UNKNOWN     Spouse name: N/A    Number of children: 0    Years of education: N/A     Occupational History    front office, coding Retired     Dr. Palomo Cam, [employer]     Social History Main Topics    Smoking status: Former Smoker    Smokeless tobacco: Never Used    Alcohol use 0.0 oz/week     0 Standard drinks or equivalent per week      Comment: minimal    Drug use: No    Sexual activity: No     Other Topics Concern     Service No    Blood Transfusions No    Caffeine Concern No    Occupational Exposure No    Hobby Hazards No    Sleep Concern No    Stress Concern No    Weight Concern No    Special Diet No    Back Care No    Exercise No    Bike Helmet No    Seat Belt Yes    Self-Exams No     Social History Narrative       Family History   Problem Relation Age of Onset    Stroke Mother     Heart Disease Father        Above history reviewed. ROS:  Denies fever, chills, cough, chest pain, SOB,  nausea, vomiting, or diarrhea. Denies wt loss, wt gain, hemoptysis, hematochezia or melena. Physical Examination:    /65 (BP 1 Location: Left arm, BP Patient Position: Sitting)  Pulse 67  Temp 98 °F (36.7 °C) (Oral)   Resp 19  Wt 154 lb (69.9 kg)  SpO2 100%  BMI 29.1 kg/m2    General: Alert and Ox3, Fluent speech  Neck:  Supple, no adenopathy, JVD, mass or bruit  Chest:  Clear to Ausculation, without wheezes, rales, rubs or ronchi  Cardiac: RRR  Extremities:  No cyanosis, clubbing or edema  Neurologic:  Ambulatory without assist, CN 2-12 grossly intact. Moves all extremities. Skin: no rash    ASSESSMENT AND PLAN:     1. Neuropathy (HCC)  Improved with Lyrica, but still having symptoms. Patient to check with her mail order so see how much Lyrica will cost.    If affordable, will increase to 75 mg TID. If too expensive, will change to gabapentin    2. History of ovarian cancer  Plan to check  next visit.       RTC in December    Lukasz Dickerson NP

## 2017-11-17 DIAGNOSIS — E78.5 HYPERLIPIDEMIA, UNSPECIFIED HYPERLIPIDEMIA TYPE: ICD-10-CM

## 2017-11-17 DIAGNOSIS — C56.9 MALIGNANT NEOPLASM OF OVARY, UNSPECIFIED LATERALITY (HCC): ICD-10-CM

## 2017-11-17 DIAGNOSIS — K21.9 GASTROESOPHAGEAL REFLUX DISEASE WITHOUT ESOPHAGITIS: ICD-10-CM

## 2017-11-17 DIAGNOSIS — F51.01 PRIMARY INSOMNIA: ICD-10-CM

## 2017-11-17 RX ORDER — ZOLPIDEM TARTRATE 5 MG/1
5 TABLET ORAL
Qty: 30 TAB | Refills: 5 | Status: SHIPPED | OUTPATIENT
Start: 2017-11-17 | End: 2017-11-24 | Stop reason: SDUPTHER

## 2017-11-17 RX ORDER — CHOLESTYRAMINE 4 G/9G
4 POWDER, FOR SUSPENSION ORAL 2 TIMES DAILY WITH MEALS
Qty: 6 CAN | Refills: 3 | Status: SHIPPED | OUTPATIENT
Start: 2017-11-17 | End: 2018-12-16 | Stop reason: SDUPTHER

## 2017-11-17 NOTE — TELEPHONE ENCOUNTER
[phone] contact # per Scott Mcadams, need refills called in to  [facility] mail order for the follow,ing medicarions:   cholestyramine 4 gram powder 90 day/supply   zolpidem 5 mg tablet 90 day/supply    Only have a few medication left & need new script sent in to pharmacy

## 2017-11-24 DIAGNOSIS — E78.1 HYPERTRIGLYCERIDEMIA: ICD-10-CM

## 2017-11-24 DIAGNOSIS — F51.01 PRIMARY INSOMNIA: ICD-10-CM

## 2017-11-24 RX ORDER — ZOLPIDEM TARTRATE 5 MG/1
5 TABLET ORAL
Qty: 90 TAB | Refills: 3 | Status: SHIPPED | OUTPATIENT
Start: 2017-11-24 | End: 2017-11-30 | Stop reason: SDUPTHER

## 2017-11-24 RX ORDER — ATORVASTATIN CALCIUM 20 MG/1
20 TABLET, FILM COATED ORAL DAILY
Qty: 90 TAB | Refills: 5 | Status: SHIPPED | OUTPATIENT
Start: 2017-11-24 | End: 2017-11-24 | Stop reason: SDUPTHER

## 2017-11-24 RX ORDER — ATORVASTATIN CALCIUM 20 MG/1
20 TABLET, FILM COATED ORAL DAILY
Qty: 90 TAB | Refills: 5 | Status: SHIPPED | OUTPATIENT
Start: 2017-11-24 | End: 2018-06-12 | Stop reason: SINTOL

## 2017-11-30 DIAGNOSIS — F51.01 PRIMARY INSOMNIA: ICD-10-CM

## 2017-11-30 RX ORDER — ZOLPIDEM TARTRATE 5 MG/1
5 TABLET ORAL
Qty: 90 TAB | Refills: 3 | Status: SHIPPED | OUTPATIENT
Start: 2017-11-30 | End: 2018-03-02 | Stop reason: SDUPTHER

## 2017-11-30 NOTE — TELEPHONE ENCOUNTER
[phone] contact # ceci Pathak, need refill called in to [place] [address] [facility] order [facility] for the following medications:   zolpidem (AMBIEN) 5 mg tablet 90 day/supply script  Please send as just talked to pharmacy and they don't have the script.   Has to be a 90day/supply    Thanks,

## 2017-12-05 ENCOUNTER — OFFICE VISIT (OUTPATIENT)
Dept: FAMILY MEDICINE CLINIC | Age: 77
End: 2017-12-05

## 2017-12-05 VITALS
WEIGHT: 151.8 LBS | BODY MASS INDEX: 28.66 KG/M2 | RESPIRATION RATE: 16 BRPM | OXYGEN SATURATION: 98 % | SYSTOLIC BLOOD PRESSURE: 132 MMHG | HEIGHT: 61 IN | DIASTOLIC BLOOD PRESSURE: 68 MMHG | HEART RATE: 82 BPM

## 2017-12-05 DIAGNOSIS — C56.9 MALIGNANT NEOPLASM OF OVARY, UNSPECIFIED LATERALITY (HCC): Primary | ICD-10-CM

## 2017-12-05 DIAGNOSIS — M54.9 CHRONIC MIDLINE BACK PAIN, UNSPECIFIED BACK LOCATION: ICD-10-CM

## 2017-12-05 DIAGNOSIS — G89.29 CHRONIC MIDLINE BACK PAIN, UNSPECIFIED BACK LOCATION: ICD-10-CM

## 2017-12-05 DIAGNOSIS — E78.1 HYPERTRIGLYCERIDEMIA: ICD-10-CM

## 2017-12-05 NOTE — LETTER
12/7/2017 3:50 PM 
 
Ms. Huber Vincent [address] [facility] [place] [phone] Dear Huber Vincent: Please find your most recent results below. Resulted Orders METABOLIC PANEL, COMPREHENSIVE Result Value Ref Range Glucose 106 (H) 65 - 99 mg/dL BUN 16 8 - 27 mg/dL Creatinine 1.05 (H) 0.57 - 1.00 mg/dL GFR est non-AA 52 (L) >59 mL/min/1.73 GFR est AA 60 >59 mL/min/1.73  
 BUN/Creatinine ratio 15 12 - 28 Sodium 141 134 - 144 mmol/L Potassium 4.8 3.5 - 5.2 mmol/L Chloride 100 96 - 106 mmol/L  
 CO2 25 18 - 29 mmol/L Calcium 9.1 8.7 - 10.3 mg/dL Protein, total 6.5 6.0 - 8.5 g/dL Albumin 4.2 3.5 - 4.8 g/dL GLOBULIN, TOTAL 2.3 1.5 - 4.5 g/dL A-G Ratio 1.8 1.2 - 2.2 Bilirubin, total 0.3 0.0 - 1.2 mg/dL Alk. phosphatase 83 39 - 117 IU/L  
 AST (SGOT) 19 0 - 40 IU/L  
 ALT (SGPT) 23 0 - 32 IU/L Narrative Performed at:  [address]  [id number] : Bessy Sandoval MD, Phone:  [phone]  IN THE PRESENCE OF HAMA Result Value Ref Range  in the Presence of HAMA 9.7 0.0 - 38.1 U/mL Comment:  
   [place] ECLIA methodology Narrative Performed at:  [address]  [id number] : Bessy Sandoval MD, Phone:  [phone] RECOMMENDATIONS: 
Keep up the good work! Please call me if you have any questions: [phone] Sincerely, Kasey Gosselin, NP

## 2017-12-05 NOTE — MR AVS SNAPSHOT
Visit Information Date & Time Provider Department Dept. Phone Encounter #  
 12/5/2017  9:30 AM Lucie De La Paz NP [phone] [id number] Follow-up Instructions Return in about 6 months (around 6/5/2018). Follow-up and Disposition History Upcoming Health Maintenance Date Due OSTEOPOROSIS SCREENING (DEXA) 8/25/2006 GLAUCOMA SCREENING Q2Y 6/12/2015 MEDICARE YEARLY EXAM 4/18/2018 DTaP/Tdap/Td series (2 - Td) 8/25/2027 Allergies as of 12/5/2017  Review Complete On: 12/5/2017 By: Lucie De La Paz NP Severity Noted Reaction Type Reactions Feldene [Piroxicam]  02/11/2016    Other (comments) Stomach problem Toradol [Ketorolac Tromethamine]  06/10/2013    Itching Voltaren [Diclofenac Sodium]  06/10/2013    Diarrhea Current Immunizations  Reviewed on 9/1/2017 Name Date Pneumococcal Conjugate (PCV-13) 11/13/2015 Pneumococcal Polysaccharide (PPSV-23) 10/29/2004 Zoster Vaccine, Live 1/31/2012 Not reviewed this visit You Were Diagnosed With   
  
 Codes Comments Malignant neoplasm of ovary, unspecified laterality ([facility] [address]    -  Primary ICD-10-CM: C56.9 ICD-9-CM: 183.0 Hypertriglyceridemia     ICD-10-CM: E78.1 ICD-9-CM: 272.1 Chronic midline back pain, unspecified back location     ICD-10-CM: M54.9, G89.29 ICD-9-CM: 724.5, 338.29 Vitals BP Pulse Resp Height(growth percentile) Weight(growth percentile) SpO2  
 132/68 (BP 1 Location: Left arm, BP Patient Position: Sitting) 82 16 5' 1\" (1.549 m) 151 lb 12.8 oz (68.9 kg) 98% BMI OB Status Smoking Status 28.68 kg/m2 Postmenopausal Former Smoker Vitals History BMI and BSA Data Body Mass Index Body Surface Area  
 28.68 kg/m 2 1.72 m 2 Preferred Pharmacy Pharmacy Name Phone [address] [facility], [address] [address] [place] [address] [address] Your Updated Medication List  
  
   
 This list is accurate as of: 12/5/17 10:27 AM.  Always use your most recent med list.  
  
  
  
  
 acetaminophen-codeine 300-30 mg per tablet Commonly known as:  TYLENOL #3 Take 1 Tab by mouth two (2) times daily as needed for Pain. Max Daily Amount: 2 Tabs. amLODIPine 10 mg tablet Commonly known as:  Conchita Spruce TAKE 1 TABLET BY MOUTH  DAILY. INDICATIONS:  PRESSURE  
  
 atorvastatin 20 mg tablet Commonly known as:  LIPITOR Take 1 Tab by mouth daily. cholestyramine-sucrose 4 gram powder Commonly known as:  Ngo Santos Take 4 g by mouth two (2) times daily (with meals). losartan-hydroCHLOROthiazide 100-12.5 mg per tablet Commonly known as:  HYZAAR  
TAKE 1 TABLET BY MOUTH  DAILY. INDICATIONS:  PRESSURE  
  
 metoprolol tartrate 50 mg tablet Commonly known as:  LOPRESSOR  
TAKE 1 TABLET BY MOUTH TWO  TIMES A DAY. INDICATIONS:  PRESSURE AND HEART  
  
 omeprazole 20 mg capsule Commonly known as:  PRILOSEC  
TAKE 1 CAP BY MOUTH TWO  TIMES A DAY. INDICATIONS:  PREVENTION OF NSAID-INDUCED GASTRIC ULCER  
  
 spironolactone 25 mg tablet Commonly known as:  ALDACTONE  
TAKE 1/2 TABLET BY MOUTH DAILY  
  
 TYLENOL ARTHRITIS PAIN 650 mg Tber Generic drug:  acetaminophen Take 650 mg by mouth every six (6) hours as needed for Pain.  
  
 zolpidem 5 mg tablet Commonly known as:  AMBIEN Take 1 Tab by mouth nightly as needed for Sleep. Max Daily Amount: 5 mg. Indications: Sleep We Performed the Following  IN THE PRESENCE OF HAMA [30736 CPT(R)] METABOLIC PANEL, COMPREHENSIVE [34904 CPT(R)] Follow-up Instructions Return in about 6 months (around 6/5/2018). To-Do List   
 12/05/2017 Lab:  LIPID PANEL   
  
 12/05/2017 Imaging:  XR HIP LT W OR WO PELV 2-3 VWS   
  
 12/05/2017 Imaging:  XR HIP RT W OR WO PELV 2-3 VWS   
  
 12/05/2017 Imaging:  XR SPINE CERV 4 OR 5 V   
  
 12/05/2017   Imaging:  XR SPINE LUMB 2 OR 3 V   
  
 12/05/2017 Imaging:  XR SPINE THORAC 3 V Patient Instructions Back Stretches: Exercises Your Care Instructions Here are some examples of exercises for stretching your back. Start each exercise slowly. Ease off the exercise if you start to have pain. Your doctor or physical therapist will tell you when you can start these exercises and which ones will work best for you. How to do the exercises Overhead stretch 1. Stand comfortably with your feet shoulder-width apart. 2. Looking straight ahead, raise both arms over your head and reach toward the ceiling. Do not allow your head to tilt back. 3. Hold for 15 to 30 seconds, then lower your arms to your sides. 4. Repeat 2 to 4 times. Side stretch 1. Stand comfortably with your feet shoulder-width apart. 2. Raise one arm over your head, and then lean to the other side. 3. Slide your hand down your leg as you let the weight of your arm gently stretch your side muscles. Hold for 15 to 30 seconds. 4. Repeat 2 to 4 times on each side. Press-up 1. Lie on your stomach, supporting your body with your forearms. 2. Press your elbows down into the floor to raise your upper back. As you do this, relax your stomach muscles and allow your back to arch without using your back muscles. As your press up, do not let your hips or pelvis come off the floor. 3. Hold for 15 to 30 seconds, then relax. 4. Repeat 2 to 4 times. Relax and rest 
 
1. Lie on your back with a rolled towel under your neck and a pillow under your knees. Extend your arms comfortably to your sides. 2. Relax and breathe normally. 3. Remain in this position for about 10 minutes. 4. If you can, do this 2 or 3 times each day. Follow-up care is a key part of your treatment and safety. Be sure to make and go to all appointments, and call your doctor if you are having problems. It's also a good idea to know your test results and keep a list of the medicines you take. Where can you learn more? Go to http://gonzalo-donny.info/. Enter M221 in the search box to learn more about \"Back Stretches: Exercises. \" Current as of: March 21, 2017 Content Version: 11.4 © 7868-1620 Healthwise, Incorporated. Care instructions adapted under license by [employer] (which disclaims liability or warranty for this information). If you have questions about a medical condition or this instruction, always ask your healthcare professional. [address] any warranty or liability for your use of this information. Introducing [facility]! Willis Story introduces [employer] patient portal. Now you can access parts of your medical record, email your doctor's office, and request medication refills online. 1. In your internet browser, go to [email or web address] 2. Click on the First Time User? Click Here link in the Sign In box. You will see the New Member Sign Up page. 3. Enter your [employer] Access Code exactly as it appears below. You will not need to use this code after youve completed the sign-up process. If you do not sign up before the expiration date, you must request a new code. · [employer] Access Code: [email or web address] Expires: 3/5/2018 10:27 AM 
 
4. Enter the last four digits of your Social Security Number (xxxx) and Date of Birth (mm/dd/yyyy) as indicated and click Submit. You will be taken to the next sign-up page. 5. Create a [employer] Onlinet ID. This will be your [employer] login ID and cannot be changed, so think of one that is secure and easy to remember. 6. Create a [employer] password. You can change your password at any time. 7. Enter your Password Reset Question and Answer. This can be used at a later time if you forget your password. 8. Enter your e-mail address. You will receive e-mail notification when new information is available in [address] 9. Click Sign Up. You can now view and download portions of your medical record. 10. Click the Download Summary menu link to download a portable copy of your medical information. If you have questions, please visit the Frequently Asked Questions section of the [employer] website. Remember, [employer] is NOT to be used for urgent needs. For medical emergencies, dial 911. Now available from your iPhone and Android! Please provide this summary of care documentation to your next provider. Your primary care clinician is listed as Lexi Tellez. If you have any questions after today's visit, please call [phone].

## 2017-12-05 NOTE — PROGRESS NOTES
Chief Complaint   Patient presents with    Medication Refill     Zolpidem         HPI:      Ravi Triana is a 69 y.o. female. HTN  BP's ok lately. Remains on norvasc, hyzaar, aldactone, and metoprolol. Taking regularly     Hyperlipidemia. On questran and benefiber. Aren well. No myalgias, arthralgias, unusual weakness. LBP  Has been doing better since last visit on aleve. Takes Prilosec daily for gastric ulcer prevention. Hx ovarian cancer. Doing well on questran at BID. Last Ca125 was ok last check     Insomnia  Doing well on ambien 5mg QHS. Neuropathy  Was on 75 mg of Lyrica once per day but stopped taking it because she did not feel as though it was working. .  Symptoms are not controlled. Having severe pain of the left foot > right. Having the feeling of \"pins and needles\". Tried back on Gabapentin, had diarrhea and wants to stop it. New Issues:  She was started on a statin medication for her Triglycerides. Is tolerating it well. Does not want to keep taking the gabapentin. Allergies   Allergen Reactions    Feldene [Piroxicam] Other (comments)     Stomach problem    Toradol [Ketorolac Tromethamine] Itching    Voltaren [Diclofenac Sodium] Diarrhea       Current Outpatient Prescriptions   Medication Sig    zolpidem (AMBIEN) 5 mg tablet Take 1 Tab by mouth nightly as needed for Sleep. Max Daily Amount: 5 mg. Indications: Sleep    atorvastatin (LIPITOR) 20 mg tablet Take 1 Tab by mouth daily.  cholestyramine-sucrose (QUESTRAN) 4 gram powder Take 4 g by mouth two (2) times daily (with meals).  gabapentin (NEURONTIN) 300 mg capsule Take 1 Cap by mouth three (3) times daily. Indications: NEUROPATHIC PAIN    amLODIPine (NORVASC) 10 mg tablet TAKE 1 TABLET BY MOUTH  DAILY. INDICATIONS:  PRESSURE    metoprolol tartrate (LOPRESSOR) 50 mg tablet TAKE 1 TABLET BY MOUTH TWO  TIMES A DAY.  INDICATIONS:  PRESSURE AND HEART    losartan-hydroCHLOROthiazide (HYZAAR) 100-12.5 mg per tablet TAKE 1 TABLET BY MOUTH  DAILY. INDICATIONS:  PRESSURE    spironolactone (ALDACTONE) 25 mg tablet TAKE 1/2 TABLET BY MOUTH DAILY    omeprazole (PRILOSEC) 20 mg capsule TAKE 1 CAP BY MOUTH TWO  TIMES A DAY. INDICATIONS:  PREVENTION OF NSAID-INDUCED GASTRIC ULCER    acetaminophen (TYLENOL ARTHRITIS PAIN) 650 mg CR tablet Take 650 mg by mouth every six (6) hours as needed for Pain.  acetaminophen-codeine (TYLENOL #3) 300-30 mg per tablet Take 1 Tab by mouth two (2) times daily as needed for Pain. Max Daily Amount: 2 Tabs.  ALPRAZolam (XANAX) 0.5 mg tablet Take 0.5 mg by mouth. 1/2 Pill PRN when needed     No current facility-administered medications for this visit. Past Medical History:   Diagnosis Date    Cancer ([facility]     ovarian    [facility]    Cat scratch fever 09/91    Cervical prolapse 2011    Grade II    Chicken pox 1962    Contact dermatitis and other eczema, due to unspecified cause     Cystocele 2011    grade I    Diverticulosis     1985    Duodenal ulcer     Eye disorder 06/95    recurrent erosion of L eye    GERD (gastroesophageal reflux disease)     [place] measles 1964    Hypercholesterolemia     Hypertension     Menarche     onset at age 16    Menopause     onset at age 47    Neuropathy     [facility] 07/03    Poorly diff.     Postmenopausal vaginal bleeding 9/19/2013    Due to Granulation tissue from her pessary, treated w/ silver nitrate stick on 9.19.2013    Presence of pessary     #2    PARUL (stress urinary incontinence, female)        Past Surgical History:   Procedure Laterality Date    HX APPENDECTOMY      HX COLECTOMY      ([address]    HX DILATION AND CURETTAGE  1963    colonization    HX SALPINGO-OOPHORECTOMY  7/03    Exploratory Lap, omentectomy, tumor debulking Lucio Altman)    HX TONSILLECTOMY  1964       Social History     Social History    Marital status: UNKNOWN     Spouse name: N/A    Number of children: 0    Years of education: N/A     Occupational History    front office, 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 Vaughan, [employer]     Social History Main Topics    Smoking status: Former Smoker    Smokeless tobacco: Never Used    Alcohol use 0.0 oz/week     0 Standard drinks or equivalent per week      Comment: minimal    Drug use: No    Sexual activity: No     Other Topics Concern     Service No    Blood Transfusions No    Caffeine Concern No    Occupational Exposure No    Hobby Hazards No    Sleep Concern No    Stress Concern No    Weight Concern No    Special Diet No    Back Care No    Exercise No    Bike Helmet No    Seat Belt Yes    Self-Exams No     Social History Narrative       Family History   Problem Relation Age of Onset    Stroke Mother     Heart Disease Father        Above history reviewed. ROS:  Denies fever, chills, cough, chest pain, SOB,  nausea, vomiting, or diarrhea. Denies wt loss, wt gain, hemoptysis, hematochezia or melena. Physical Examination:    /68 (BP 1 Location: Left arm, BP Patient Position: Sitting)  Pulse 82  Resp 16  Ht 5' 1\" (1.549 m)  Wt 151 lb 12.8 oz (68.9 kg)  SpO2 98%  BMI 28.68 kg/m2    General: Alert and Ox3, Fluent speech  Neck:  Supple, no adenopathy, JVD, mass or bruit  Chest:  Clear to Ausculation, without wheezes, rales, rubs or ronchi  Cardiac: RRR  Extremities:  No cyanosis, clubbing or edema  Neurologic:  Ambulatory without assist, CN 2-12 grossly intact. Moves all extremities. Skin: no rash  Lymphadenopathy: no cervical or supraclavicular nodes    ASSESSMENT AND PLAN:     1. Malignant neoplasm of ovary, unspecified laterality (HCC)  Checking lab levels  -  IN THE PRESENCE OF HAMA    2. Hypertriglyceridemia  Checking CMP today to assess liver  Plan for cholesterol check in March  - METABOLIC PANEL, COMPREHENSIVE  - LIPID PANEL; Future    3.  Chronic midline back pain, unspecified back location  Checking x-rays at patient request  - XR SPINE LUMB 2 OR 3 V; Future  - XR SPINE CERV 4 OR 5 V; Future  - XR SPINE THORAC 3 V; Future  - XR HIP LT W OR WO PELV 2-3 VWS; Future  - XR HIP RT W OR WO PELV 2-3 VWS; Future     RTC in 6 months    Eric Serrano, NP

## 2017-12-07 ENCOUNTER — TELEPHONE (OUTPATIENT)
Dept: FAMILY MEDICINE CLINIC | Age: 77
End: 2017-12-07

## 2017-12-07 LAB
ALBUMIN SERPL-MCNC: 4.2 G/DL (ref 3.5–4.8)
ALBUMIN/GLOB SERPL: 1.8 {RATIO} (ref 1.2–2.2)
ALP SERPL-CCNC: 83 IU/L (ref 39–117)
ALT SERPL-CCNC: 23 IU/L (ref 0–32)
AST SERPL-CCNC: 19 IU/L (ref 0–40)
BILIRUB SERPL-MCNC: 0.3 MG/DL (ref 0–1.2)
BUN SERPL-MCNC: 16 MG/DL (ref 8–27)
BUN/CREAT SERPL: 15 (ref 12–28)
CALCIUM SERPL-MCNC: 9.1 MG/DL (ref 8.7–10.3)
CANCER AG125 SERPL-ACNC: 9.7 U/ML (ref 0–38.1)
CHLORIDE SERPL-SCNC: 100 MMOL/L (ref 96–106)
CO2 SERPL-SCNC: 25 MMOL/L (ref 18–29)
CREAT SERPL-MCNC: 1.05 MG/DL (ref 0.57–1)
GFR SERPLBLD CREATININE-BSD FMLA CKD-EPI: 52 ML/MIN/1.73
GFR SERPLBLD CREATININE-BSD FMLA CKD-EPI: 60 ML/MIN/1.73
GLOBULIN SER CALC-MCNC: 2.3 G/DL (ref 1.5–4.5)
GLUCOSE SERPL-MCNC: 106 MG/DL (ref 65–99)
POTASSIUM SERPL-SCNC: 4.8 MMOL/L (ref 3.5–5.2)
PROT SERPL-MCNC: 6.5 G/DL (ref 6–8.5)
SODIUM SERPL-SCNC: 141 MMOL/L (ref 134–144)

## 2017-12-07 NOTE — PROGRESS NOTES
Sugar is a little lower than last check. Sodium is improved. Drink plenty of fluid. Liver looks fine.  is within normal range at 9. 7.

## 2017-12-07 NOTE — TELEPHONE ENCOUNTER
Spoke with patient on the phone and she is aware of her current lab results.  A copy of her results is being mailed out to her per her request.

## 2017-12-07 NOTE — TELEPHONE ENCOUNTER
----- Message from Eric Serrano NP sent at 12/7/2017  3:40 PM EST -----  Sugar is a little lower than last check. Sodium is improved. Drink plenty of fluid. Liver looks fine.  is within normal range at 9.7.

## 2018-03-02 DIAGNOSIS — F51.01 PRIMARY INSOMNIA: ICD-10-CM

## 2018-03-02 RX ORDER — ZOLPIDEM TARTRATE 5 MG/1
5 TABLET ORAL
Qty: 90 TAB | Refills: 3 | Status: SHIPPED | OUTPATIENT
Start: 2018-03-02 | End: 2018-07-03 | Stop reason: SDUPTHER

## 2018-05-08 DIAGNOSIS — I10 ESSENTIAL HYPERTENSION: ICD-10-CM

## 2018-05-09 RX ORDER — SPIRONOLACTONE 25 MG/1
TABLET ORAL
Qty: 45 TAB | Refills: 0 | Status: SHIPPED | COMMUNITY
Start: 2018-05-09 | End: 2019-07-05 | Stop reason: SDUPTHER

## 2018-05-15 ENCOUNTER — OFFICE VISIT (OUTPATIENT)
Dept: FAMILY MEDICINE CLINIC | Age: 78
End: 2018-05-15

## 2018-05-15 VITALS
HEART RATE: 71 BPM | SYSTOLIC BLOOD PRESSURE: 120 MMHG | OXYGEN SATURATION: 98 % | WEIGHT: 150 LBS | BODY MASS INDEX: 27.6 KG/M2 | DIASTOLIC BLOOD PRESSURE: 60 MMHG | RESPIRATION RATE: 17 BRPM | HEIGHT: 62 IN

## 2018-05-15 DIAGNOSIS — Z78.0 POSTMENOPAUSAL: ICD-10-CM

## 2018-05-15 DIAGNOSIS — R73.01 IMPAIRED FASTING GLUCOSE: ICD-10-CM

## 2018-05-15 DIAGNOSIS — Z00.00 MEDICARE ANNUAL WELLNESS VISIT, SUBSEQUENT: Primary | ICD-10-CM

## 2018-05-15 DIAGNOSIS — E78.00 PURE HYPERCHOLESTEROLEMIA: ICD-10-CM

## 2018-05-15 DIAGNOSIS — G62.9 NEUROPATHY: ICD-10-CM

## 2018-05-15 DIAGNOSIS — C56.9 MALIGNANT NEOPLASM OF OVARY, UNSPECIFIED LATERALITY (HCC): ICD-10-CM

## 2018-05-15 DIAGNOSIS — R82.90 ABNORMAL URINALYSIS: ICD-10-CM

## 2018-05-15 DIAGNOSIS — Z13.31 SCREENING FOR DEPRESSION: ICD-10-CM

## 2018-05-15 LAB
BILIRUB UR QL STRIP: NEGATIVE
GLUCOSE UR-MCNC: NEGATIVE MG/DL
KETONES P FAST UR STRIP-MCNC: NEGATIVE MG/DL
PH UR STRIP: 5.5 [PH] (ref 4.6–8)
PROT UR QL STRIP: NEGATIVE
SP GR UR STRIP: 1.01 (ref 1–1.03)
UA UROBILINOGEN AMB POC: NORMAL (ref 0.2–1)
URINALYSIS CLARITY POC: CLEAR
URINALYSIS COLOR POC: YELLOW
URINE BLOOD POC: NORMAL
URINE LEUKOCYTES POC: NORMAL
URINE NITRITES POC: NEGATIVE

## 2018-05-15 NOTE — PATIENT INSTRUCTIONS
The best way to stay healthy is to live a healthy lifestyle. A healthy lifestyle includes regular exercise, eating a well-balanced diet, keeping a healthy weight and not smoking. Regular physical exams and screening tests are another important way to take care of yourself. Preventive exams provided by health care providers can find health problems early when treatment works best and can keep you from getting certain diseases or illnesses. Preventive services include exams, lab tests, screenings, shots, monitoring and information to help you take care of your own health. All people over 65 should have a pneumonia shot. Pneumonia shots are usually only needed once in a lifetime unless your doctor decides differently. In addition to your physical exam, some screening tests are recommended:    All people over 65 should have a yearly flu shot. People over 65 are at medium to high risk for Hepatitis B. Three shots are needed for complete protection. Bone mass measurement (dexa scan) is recommended every two years. Diabetes Mellitus screening is recommended every year. Glaucoma is an eye disease caused by high pressure in the eye. An eye exam is recommended every year. Cardiovascular screening tests that check your cholesterol and other blood fat (lipid) levels are recommended every five years. Colorectal Cancer screening tests help to find pre-cancerous polyps (growths in the colon) so they can be removed before they turn into cancer. Tests ordered for screening depend on your personal and family history risk factors. Prostate Cancer Screening (annually up to age 76)    Screening for breast cancer is recommended yearly with a Mammogram.    Screening for cervical and vaginal cancer is recommended with a pelvic and Pap test every two years.  However if you have had an abnormal pap in the past  three years or at high risk for cervical or vaginal cancer Medicare will cover a pap test and a pelvic exam every year. Here is a list of your current Health Maintenance items with a due date:  Health Maintenance Due   Topic Date Due    Bone Mineral Density   08/25/2006    Glaucoma Screening   06/12/2015    Annual Well Visit  04/18/2018         Medicare Wellness Visit, Female    The best way to live healthy is to have a healthy lifestyle by eating a well-balanced diet, exercising regularly, limiting alcohol and stopping smoking. Regular physical exams and screening tests are another way to keep healthy. Preventive exams provided by your health care provider can find health problems before they become diseases or illnesses. Preventive services including immunizations, screening tests, monitoring and exams can help you take care of your own health. All people over age 72 should have a pneumovax  and and a prevnar shot to prevent pneumonia. These are once in a lifetime unless you and your provider decide differently. All people over 65 should have a yearly flu shot and a tetanus vaccine every 10 years. A bone mass density to screen for osteoporosis or thinning of the bones should be done every 2 years after 65. Screening for diabetes mellitus with a blood sugar test should be done every year. Glaucoma is a disease of the eye due to increased ocular pressure that can lead to blindness and it should be done every year by an eye professional.    Cardiovascular screening tests that check for elevated lipids (fatty part of blood) which can lead to heart disease and strokes should be done every 5 years. Colorectal screening that evaluates for blood or polyps in your colon should be done yearly as a stool test or every five years as a flexible sigmoidoscope or every 10 years as a colonoscopy up to age 76. Breast cancer screening with a mammogram is recommended biennially  for women age 54-69.     Screening for cervical cancer with a pap smear and pelvic exam is recommended for women after age 72 years every 2 years up to age 79 or when the provider and patient decide to stop. If there is a history of cervical abnormalities or other increased risk for cancer then the test is recommended yearly. Hepatitis C screening is also recommended for anyone born between 80 through [address]. A shingles vaccine is also recommended once in a lifetime after age 61. Your Medicare Wellness Exam is recommended annually. Here is a list of your current Health Maintenance items with a due date:  Health Maintenance Due   Topic Date Due    Bone Mineral Density   08/25/2006    Glaucoma Screening   06/12/2015    Annual Well Visit  04/18/2018     If you have any questions regarding mychart, you may call [facility] support at [phone]. If you have any questions regarding mychart, you may call [employer] support at [phone].

## 2018-05-15 NOTE — LETTER
5/18/2018 1:18 PM 
 
Ms. Frida Vivar [address] [facility] [phone] Dear Frida Vivar: Please find your most recent results below. Resulted Orders CANCER ANTIGEN 125 Result Value Ref Range Cancer Ag (CA) 125 14.0 0.0 - 38.1 U/mL Comment:  
   [facility] ECLIA methodology Narrative Performed at:  [address]  [id number] : Greg Coleman MD, Phone:  [phone] METABOLIC PANEL, COMPREHENSIVE Result Value Ref Range Glucose 104 (H) 65 - 99 mg/dL BUN 16 8 - 27 mg/dL Creatinine 0.83 0.57 - 1.00 mg/dL GFR est non-AA 69 >59 mL/min/1.73 GFR est AA 79 >59 mL/min/1.73  
 BUN/Creatinine ratio 19 12 - 28 Sodium 134 134 - 144 mmol/L Potassium 4.8 3.5 - 5.2 mmol/L Chloride 95 (L) 96 - 106 mmol/L  
 CO2 25 18 - 29 mmol/L Calcium 9.2 8.7 - 10.3 mg/dL Protein, total 6.3 6.0 - 8.5 g/dL Albumin 3.9 3.5 - 4.8 g/dL GLOBULIN, TOTAL 2.4 1.5 - 4.5 g/dL A-G Ratio 1.6 1.2 - 2.2 Bilirubin, total 0.3 0.0 - 1.2 mg/dL Alk. phosphatase 70 39 - 117 IU/L  
 AST (SGOT) 20 0 - 40 IU/L  
 ALT (SGPT) 21 0 - 32 IU/L Narrative Performed at:  [address]  [id number] : Greg Coleman MD, Phone:  [phone] CBC WITH AUTOMATED DIFF Result Value Ref Range WBC 6.9 3.4 - 10.8 x10E3/uL  
 RBC 4.38 3.77 - 5.28 x10E6/uL HGB 12.2 11.1 - 15.9 g/dL HCT 37.9 34.0 - 46.6 % MCV 87 79 - 97 fL  
 MCH 27.9 26.6 - 33.0 pg  
 MCHC 32.2 31.5 - 35.7 g/dL  
 RDW 14.6 12.3 - 15.4 % PLATELET 528 435 - 767 x10E3/uL NEUTROPHILS 49 Not Estab. % Lymphocytes 34 Not Estab. % MONOCYTES 10 Not Estab. % EOSINOPHILS 6 Not Estab. % BASOPHILS 1 Not Estab. %  
 ABS. NEUTROPHILS 3.5 1.4 - 7.0 x10E3/uL Abs Lymphocytes 2.3 0.7 - 3.1 x10E3/uL  
 ABS. MONOCYTES 0.7 0.1 - 0.9 x10E3/uL  
 ABS. EOSINOPHILS 0.4 0.0 - 0.4 x10E3/uL ABS. BASOPHILS 0.0 0.0 - 0.2 x10E3/uL IMMATURE GRANULOCYTES 0 Not Estab. %  
 ABS. IMM. GRANS. 0.0 0.0 - 0.1 x10E3/uL Narrative Performed at:  [address]  [id number] : Vanessa Gonzales MD, Phone:  [phone] HEMOGLOBIN A1C WITH EAG Result Value Ref Range Hemoglobin A1c 6.1 (H) 4.8 - 5.6 % Comment:  
            Pre-diabetes: 5.7 - 6.4 Diabetes: >6.4 Glycemic control for adults with diabetes: <7.0 Estimated average glucose 128 mg/dL Narrative Performed at:  [address]  [id number] : Vanessa Gonzales MD, Phone:  [phone] AMB POC URINALYSIS DIP STICK MANUAL W/ MICRO Result Value Ref Range Color (UA POC) Yellow Yellow Clarity (UA POC) Clear Clear Glucose (UA POC) Negative Negative Bilirubin (UA POC) Negative Negative Ketones (UA POC) Negative Negative Specific gravity (UA POC) 1.010 1.001 - 1.035 Blood (UA POC) 2+ Negative pH (UA POC) 5.5 4.6 - 8.0 Protein (UA POC) Negative Negative Urobilinogen (UA POC) 0.2 mg/dL 0.2 - 1 Nitrites (UA POC) Negative Negative Leukocyte esterase (UA POC)  Negative LIPID PANEL Result Value Ref Range Cholesterol, total 133 100 - 199 mg/dL Triglyceride 173 (H) 0 - 149 mg/dL HDL Cholesterol 57 >39 mg/dL VLDL, calculated 35 5 - 40 mg/dL LDL, calculated 41 0 - 99 mg/dL Narrative Performed at:  [address]  [id number] : Vanessa Gonzales MD, Phone:  [phone] CULTURE, URINE Result Value Ref Range Urine Culture, Routine Mixed urogenital santos 10,000-25,000 colony forming units per mL Narrative Performed at:  [address]  [id number] : Vanessa Gonzales MD, Phone:  [phone] Please call me if you have any questions: [phone] Sincerely, Katherine Gibson NP

## 2018-05-15 NOTE — PROGRESS NOTES
.    This is the Subsequent Medicare Annual Wellness Exam, performed 12 months or more after the Initial AWV or the last Subsequent AWV    I have reviewed the patient's medical history in detail and updated the computerized patient record. History     Past Medical History:   Diagnosis Date    [facility] ([facility]     ovarian    [facility] ([facility] 07/03    Cat scratch fever 09/91    Cervical prolapse 2011    Grade II    Chicken pox 1962    Contact dermatitis and other eczema, due to unspecified cause     Cystocele 2011    grade I    Diverticulosis     1985    Duodenal ulcer     Eye disorder 06/95    recurrent erosion of L eye    GERD (gastroesophageal reflux disease)     [place] measles 1964    Hypercholesterolemia     Hypertension     Menarche     onset at age 16    Menopause     onset at age 47    Neuropathy     [facility]var[facility] cancer ([facility] 07/03    Poorly diff.  Postmenopausal vaginal bleeding 9/19/2013    Due to Granulation tissue from her pessary, treated w/ silver nitrate stick on 9.19.2013    Presence of pessary     #2    PARUL (stress urinary incontinence, female)       Past Surgical History:   Procedure Laterality Date    HX APPENDECTOMY      HX COLECTOMY      ([address]    HX DILATION AND CURETTAGE  1963    colonization    HX SALPINGO-OOPHORECTOMY  7/03    Exploratory Lap, omentectomy, tumor debulking Mayank Lassiter)    HX TONSILLECTOMY  1964     Current Outpatient Prescriptions   Medication Sig Dispense Refill    spironolactone (ALDACTONE) 25 mg tablet TAKE 1/2 TABLET BY MOUTH DAILY 45 Tab 0    zolpidem (AMBIEN) 5 mg tablet Take 1 Tab by mouth nightly as needed for Sleep. Max Daily Amount: 5 mg. Indications: Sleep 90 Tab 3    atorvastatin (LIPITOR) 20 mg tablet Take 1 Tab by mouth daily. 90 Tab 5    cholestyramine-sucrose (QUESTRAN) 4 gram powder Take 4 g by mouth two (2) times daily (with meals). 6 Can 3    amLODIPine (NORVASC) 10 mg tablet TAKE 1 TABLET BY MOUTH  DAILY.  INDICATIONS: PRESSURE 90 Tab 3    metoprolol tartrate (LOPRESSOR) 50 mg tablet TAKE 1 TABLET BY MOUTH TWO  TIMES A DAY. INDICATIONS:  PRESSURE AND HEART 180 Tab 3    losartan-hydroCHLOROthiazide (HYZAAR) 100-12.5 mg per tablet TAKE 1 TABLET BY MOUTH  DAILY. INDICATIONS:  PRESSURE 90 Tab 3    omeprazole (PRILOSEC) 20 mg capsule TAKE 1 CAP BY MOUTH TWO  TIMES A DAY. INDICATIONS:  PREVENTION OF NSAID-INDUCED GASTRIC ULCER 180 Cap 3    acetaminophen (TYLENOL ARTHRITIS PAIN) 650 mg CR tablet Take 650 mg by mouth every six (6) hours as needed for Pain. Allergies   Allergen Reactions    Feldene [Piroxicam] Other (comments)     Stomach problem    Toradol [Ketorolac Tromethamine] Itching    Voltaren [Diclofenac Sodium] Diarrhea     Family History   Problem Relation Age of Onset    Stroke Mother     Heart Disease Father      Social History   Substance Use Topics    Smoking status: Former Smoker    Smokeless tobacco: Never Used    Alcohol use 0.0 oz/week     0 Standard drinks or equivalent per week      Comment: minimal     Patient Active Problem List   Diagnosis Code    Unspecified essential hypertension I10    Hyperlipemia E78.5    Esophageal reflux K21.9    Malignant neoplasm of ovary (HCC) C56.9    Cystocele [id number]    Presence of pessary Z92.89    Cervical prolapse N81.4    Postmenopausal vaginal bleeding N95.0    BCE (basal cell epithelioma) C44.91    History of ovarian cancer Z85.43    Midline low back pain without sciatica M54.5       Depression Risk Factor Screening:     PHQ over the last two weeks 5/15/2018   PHQ Not Done -   Little interest or pleasure in doing things Not at all   Feeling down, depressed or hopeless Not at all   Total Score PHQ 2 0     Alcohol Risk Factor Screening: You do not drink alcohol or very rarely. Functional Ability and Level of Safety:   Hearing Loss  Hearing is good. Activities of Daily Living  The home contains: no safety equipment.   Patient does total self care    Fall Risk  Fall Risk Assessment, last 12 mths 5/15/2018   Able to walk? Yes   Fall in past 12 months? No   Fall with injury? -   Number of falls in past 12 months -   Fall Risk Score -       Abuse Screen  Patient is not abused    Cognitive Screening   Evaluation of Cognitive Function:  Has your family/caregiver stated any concerns about your memory: no  Normal    Patient Care Team   Patient Care Team:  Darryl Green MD as PCP - General (Family Practice)    Assessment/Plan   Education and counseling provided:  Are appropriate based on today's review and evaluation    Diagnoses and all orders for this visit:    1. Medicare annual wellness visit, subsequent    2. Screening for depression  -     Depression Screen Annual      Health Maintenance Due   Topic Date Due    Bone Densitometry (Dexa) Screening  08/25/2006    GLAUCOMA SCREENING Q2Y  06/12/2015    MEDICARE YEARLY EXAM  04/18/2018       __________________________________        Chief Complaint   Patient presents with    Annual Wellness Visit         HPI:      Cat Bosch is a 69 y.o. female. HTN  BP's ok lately. Remains on norvasc, hyzaar, aldactone, and metoprolol. Taking regularly     Hyperlipidemia. On Atorvastatin, questran and benefiber. Aren well. No myalgias, arthralgias, unusual weakness. LBP  Has been doing better since last visit on aleve. Takes Prilosec daily for gastric ulcer prevention. Hx ovarian cancer. Doing well on questran at [facility]. Last Ca125 was ok last check     Insomnia  Doing well on ambien 5mg QHS. Neuropathy  Was on 75 mg of Lyrica once per day but stopped taking it because she did not feel as though it was working. .  Symptoms are not controlled. Having severe pain of the left foot > right. Having the feeling of \"pins and needles\". Tried back on Gabapentin, had diarrhea and stopped it. New Issues:  She is here for her annual wellness visit. She also needs her cholesterol checked.   She never got the x-rays. She has been taking Naproxen when she needs it. She says she cannot walk s/t her hips. Her neuropathy is still bothering her. Worse a night. Would like a second opinion by neurology. Allergies   Allergen Reactions    Feldene [Piroxicam] Other (comments)     Stomach problem    Toradol [Ketorolac Tromethamine] Itching    Voltaren [Diclofenac Sodium] Diarrhea       Current Outpatient Prescriptions   Medication Sig    spironolactone (ALDACTONE) 25 mg tablet TAKE 1/2 TABLET BY MOUTH DAILY    zolpidem (AMBIEN) 5 mg tablet Take 1 Tab by mouth nightly as needed for Sleep. Max Daily Amount: 5 mg. Indications: Sleep    atorvastatin (LIPITOR) 20 mg tablet Take 1 Tab by mouth daily.  cholestyramine-sucrose (QUESTRAN) 4 gram powder Take 4 g by mouth two (2) times daily (with meals).  amLODIPine (NORVASC) 10 mg tablet TAKE 1 TABLET BY MOUTH  DAILY. INDICATIONS:  PRESSURE    metoprolol tartrate (LOPRESSOR) 50 mg tablet TAKE 1 TABLET BY MOUTH TWO  TIMES A DAY. INDICATIONS:  PRESSURE AND HEART    losartan-hydroCHLOROthiazide (HYZAAR) 100-12.5 mg per tablet TAKE 1 TABLET BY MOUTH  DAILY. INDICATIONS:  PRESSURE    omeprazole (PRILOSEC) 20 mg capsule TAKE 1 CAP BY MOUTH TWO  TIMES A DAY. INDICATIONS:  PREVENTION OF NSAID-INDUCED GASTRIC ULCER    acetaminophen (TYLENOL ARTHRITIS PAIN) 650 mg CR tablet Take 650 mg by mouth every six (6) hours as needed for Pain. No current facility-administered medications for this visit.         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  Postmenopausal vaginal bleeding 9/19/2013    Due to Granulation tissue from her pessary, treated w/ silver nitrate stick on 9.19.2013    Presence of pessary     #2    PARUL (stress urinary incontinence, female)        Past Surgical History:   Procedure Laterality Date    HX APPENDECTOMY      HX COLECTOMY      ([address])    HX DILATION AND CURETTAGE  1963    colonization    HX SALPINGO-OOPHORECTOMY  7/03    Exploratory Lap, omentectomy, tumor debulking Rell Ace)    HX TONSILLECTOMY  1964       Social History     Social History    Marital status: SINGLE     Spouse name: N/A    Number of children: 0    Years of education: N/A     Occupational History    front office, coding Retired     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, [employer] [place]     Social History Main Topics    Smoking status: Former Smoker    Smokeless tobacco: Never Used    Alcohol use 0.0 oz/week     0 Standard drinks or equivalent per week      Comment: minimal    Drug use: No    Sexual activity: No     Other Topics Concern     Service No    Blood Transfusions No    Caffeine Concern No    Occupational Exposure No    Hobby Hazards No    Sleep Concern No    Stress Concern No    Weight Concern No    Special Diet No    Back Care No    Exercise No    Bike Helmet No    Seat Belt Yes    Self-Exams No     Social History Narrative       Family History   Problem Relation Age of Onset    Stroke Mother     Heart Disease Father        Above history reviewed. ROS:  Denies fever, chills, cough, chest pain, SOB,  nausea, vomiting, or diarrhea. Denies wt loss, wt gain, hemoptysis, hematochezia or melena.     Physical Examination:    /60 (BP 1 Location: Right arm, BP Patient Position: Sitting)  Pulse 71  Resp 17  Ht 5' 2\" (1.575 m)  Wt 150 lb (68 kg)  SpO2 98%  BMI 27.44 kg/m2    General: Alert and Ox3, Fluent speech  Neck:  Supple, no adenopathy, JVD, mass or bruit  Chest:  Clear to Ausculation, without wheezes, rales, rubs or ronchi  Cardiac: RRR  Extremities:  No cyanosis, clubbing or edema  Neurologic:  Ambulatory without assist, CN 2-12 grossly intact. Moves all extremities. Skin: no rash  Lymphadenopathy: no cervical or supraclavicular nodes    Results for orders placed or performed in visit on 05/15/18   AMB POC URINALYSIS DIP STICK MANUAL W/ MICRO   Result Value Ref Range    Color (UA POC) Yellow Yellow    Clarity (UA POC) Clear Clear    Glucose (UA POC) Negative Negative    Bilirubin (UA POC) Negative Negative    Ketones (UA POC) Negative Negative    Specific gravity (UA POC) 1.010 1.001 - 1.035    Blood (UA POC) 2+ Negative    pH (UA POC) 5.5 4.6 - 8.0    Protein (UA POC) Negative Negative    Urobilinogen (UA POC) 0.2 mg/dL 0.2 - 1    Nitrites (UA POC) Negative Negative    Leukocyte esterase (UA POC)  Negative      ASSESSMENT AND PLAN:     1. Medicare annual wellness visit, subsequent  Patient to make eye appointment  Ordered bone density study    2. Screening for depression  - Depression Screen Annual    3. Malignant neoplasm of ovary, unspecified laterality (HCC)  - CANCER ANTIGEN 125    4. Pure hypercholesterolemia  Good control  Continue current regimen   - METABOLIC PANEL, COMPREHENSIVE  - CBC WITH AUTOMATED DIFF  - AMB POC URINALYSIS DIP STICK MANUAL W/ MICRO  - LIPID PANEL    5. Postmenopausal  - DEXA BONE DENSITY STUDY AXIAL; Future    6. Neuropathy  Sending to neurology for second opinion.   - REFERRAL TO NEUROLOGY    7.  Impaired fasting glucose  - HEMOGLOBIN A1C WITH EAG  - AMB POC URINALYSIS DIP STICK MANUAL W/ MICRO     RTC in 6 months    Den Perdomo NP

## 2018-05-15 NOTE — MR AVS SNAPSHOT
[address] [facility] [phone] Patient: Marilu Santacruz MRN: [id number] Jorden Colmenares Visit Information Date & Time Provider Department Dept. Phone Encounter #  
 5/15/2018  9:10 AM Kwasi Richards NP [address] [facility] [id number] Follow-up Instructions Return in about 6 months (around 11/15/2018). Follow-up and Disposition History Upcoming Health Maintenance Date Due Bone Densitometry (Dexa) Screening 8/25/2006 GLAUCOMA SCREENING Q2Y 6/12/2015 MEDICARE YEARLY EXAM 4/18/2018 Influenza Age 5 to Adult 8/1/2018 DTaP/Tdap/Td series (2 - Td) 8/25/2027 Allergies as of 5/15/2018  Review Complete On: 5/15/2018 By: Kwasi Richards NP Severity Noted Reaction Type Reactions Feldene [Piroxicam]  02/11/2016    Other (comments) Stomach problem Toradol [Ketorolac Tromethamine]  06/10/2013    Itching Voltaren [Diclofenac Sodium]  06/10/2013    Diarrhea Current Immunizations  Reviewed on 9/1/2017 Name Date Pneumococcal Conjugate (PCV-13) 11/13/2015 Pneumococcal Polysaccharide (PPSV-23) 10/29/2004 Zoster Vaccine, Live 1/31/2012 Not reviewed this visit You Were Diagnosed With   
  
 Codes Comments Medicare annual wellness visit, subsequent    -  Primary ICD-10-CM: Z00.00 ICD-9-CM: V70.0 Screening for depression     ICD-10-CM: Z13.89 ICD-9-CM: V79.0 Malignant neoplasm of ovary, unspecified laterality ([facility] [address]     ICD-10-CM: C56.9 ICD-9-CM: 183.0 Pure hypercholesterolemia     ICD-10-CM: E78.00 ICD-9-CM: 272.0 Postmenopausal     ICD-10-CM: Z78.0 ICD-9-CM: V49.81 Neuropathy     ICD-10-CM: G62.9 ICD-9-CM: 355.9 Impaired fasting glucose     ICD-10-CM: R73.01 
ICD-9-CM: 790.21 Abnormal urinalysis     ICD-10-CM: R82.90 ICD-9-CM: 791.9 Vitals BP Pulse Resp Height(growth percentile) Weight(growth percentile) SpO2 120/60 (BP 1 Location: Right arm, BP Patient Position: Sitting) 71 17 5' 2\" (1.575 m) 150 lb (68 kg) 98% BMI OB Status Smoking Status 27.44 kg/m2 Postmenopausal Former Smoker Vitals History BMI and BSA Data Body Mass Index Body Surface Area  
 27.44 kg/m 2 1.72 m 2 Preferred Pharmacy Pharmacy Name Phone [facility], [address] AT [facility] OF  [address] [facility] Joanne Smiley [phone] Your Updated Medication List  
  
   
This list is accurate as of 5/15/18 10:01 AM.  Always use your most recent med list. amLODIPine 10 mg tablet Commonly known as:  Remonia Grates TAKE 1 TABLET BY MOUTH  DAILY. INDICATIONS:  PRESSURE  
  
 atorvastatin 20 mg tablet Commonly known as:  LIPITOR Take 1 Tab by mouth daily. cholestyramine-sucrose 4 gram powder Commonly known as:  Mercedes Hilda Take 4 g by mouth two (2) times daily (with meals). losartan-hydroCHLOROthiazide 100-12.5 mg per tablet Commonly known as:  HYZAAR  
TAKE 1 TABLET BY MOUTH  DAILY. INDICATIONS:  PRESSURE  
  
 metoprolol tartrate 50 mg tablet Commonly known as:  LOPRESSOR  
TAKE 1 TABLET BY MOUTH TWO  TIMES A DAY. INDICATIONS:  PRESSURE AND HEART  
  
 omeprazole 20 mg capsule Commonly known as:  PRILOSEC  
TAKE 1 CAP BY MOUTH TWO  TIMES A DAY. INDICATIONS:  PREVENTION OF NSAID-INDUCED GASTRIC ULCER  
  
 spironolactone 25 mg tablet Commonly known as:  ALDACTONE  
TAKE 1/2 TABLET BY MOUTH DAILY  
  
 TYLENOL ARTHRITIS PAIN 650 mg Tber Generic drug:  acetaminophen Take 650 mg by mouth every six (6) hours as needed for Pain.  
  
 zolpidem 5 mg tablet Commonly known as:  AMBIEN Take 1 Tab by mouth nightly as needed for Sleep. Max Daily Amount: 5 mg. Indications: Sleep We Performed the Following AMB POC URINALYSIS DIP STICK MANUAL W/ MICRO [77254 CPT(R)] CANCER ANTIGEN 125 [15159 CPT(R)] CBC WITH AUTOMATED DIFF [43567 CPT(R)] CULTURE, URINE [id number] CPT(R)] [facility] [[id number] [facility] [place]] HEMOGLOBIN A1C WITH EAG [10996 CPT(R)] LIPID PANEL [40125 CPT(R)] METABOLIC PANEL, COMPREHENSIVE [42935 CPT(R)] REFERRAL TO NEUROLOGY [KIL80 Custom] Comments:  
 Patient would like to be seen in [place] location by any available provider. Has neuropathy and was unable to tolerate gabapentin on Lyrica. Would like second opinion. Follow-up Instructions Return in about 6 months (around 11/15/2018). To-Do List   
 05/15/2018 Imaging:  DEXA BONE DENSITY STUDY AXIAL Referral Information Referral ID Referred By Referred To  
  
 [id number] DAYLIN [facility], [place]   
   [address] [place] [address] [address] [facility] [address] [place] , [address] Phone: [phone] Fax: [phone] Visits Status Start Date End Date 1 New Request 5/15/18 5/15/19 If your referral has a status of pending review or denied, additional information will be sent to support the outcome of this decision. Patient Instructions The best way to stay healthy is to live a healthy lifestyle. A healthy lifestyle includes regular exercise, eating a well-balanced diet, keeping a healthy weight and not smoking. Regular physical exams and screening tests are another important way to take care of yourself. Preventive exams provided by health care providers can find health problems early when treatment works best and can keep you from getting certain diseases or illnesses. Preventive services include exams, lab tests, screenings, shots, monitoring and information to help you take care of your own health. All people over 65 should have a pneumonia shot. Pneumonia shots are usually only needed once in a lifetime unless your doctor decides differently. In addition to your physical exam, some screening tests are recommended: All people over 65 should have a yearly flu shot. People over 65 are at medium to high risk for Hepatitis B. Three shots are needed for complete protection. Bone mass measurement (dexa scan) is recommended every two years. Diabetes Mellitus screening is recommended every year. Glaucoma is an eye disease caused by high pressure in the eye. An eye exam is recommended every year. Cardiovascular screening tests that check your cholesterol and other blood fat (lipid) levels are recommended every five years. Colorectal Cancer screening tests help to find pre-cancerous polyps (growths in the colon) so they can be removed before they turn into cancer. Tests ordered for screening depend on your personal and family history risk factors. Prostate Cancer Screening (annually up to age 76) Screening for breast cancer is recommended yearly with a Mammogram. 
 
Screening for cervical and vaginal cancer is recommended with a pelvic and Pap test every two years. However if you have had an abnormal pap in the past  three years or at high risk for cervical or vaginal cancer Medicare will cover a pap test and a pelvic exam every year. Here is a list of your current Health Maintenance items with a due date: 
Health Maintenance Due Topic Date Due  Bone Mineral Density   08/25/2006  Glaucoma Screening   06/12/2015 [address] [place] [address] Annual Well Visit  04/18/2018 Medicare Wellness Visit, Female The best way to live healthy is to have a healthy lifestyle by eating a well-balanced diet, exercising regularly, limiting alcohol and stopping smoking. Regular physical exams and screening tests are another way to keep healthy. Preventive exams provided by your health care provider can find health problems before they become diseases or illnesses. Preventive services including immunizations, screening tests, monitoring and exams can help you take care of your own health. All people over age 72 should have a pneumovax  and and a prevnar shot to prevent pneumonia. These are once in a lifetime unless you and your provider decide differently. All people over 65 should have a yearly flu shot and a tetanus vaccine every 10 years. A bone mass density to screen for osteoporosis or thinning of the bones should be done every 2 years after 65. Screening for diabetes mellitus with a blood sugar test should be done every year. Glaucoma is a disease of the eye due to increased ocular pressure that can lead to blindness and it should be done every year by an eye professional. 
 
Cardiovascular screening tests that check for elevated lipids (fatty part of blood) which can lead to heart disease and strokes should be done every 5 years. Colorectal screening that evaluates for blood or polyps in your colon should be done yearly as a stool test or every five years as a flexible sigmoidoscope or every 10 years as a colonoscopy up to age 76. Breast cancer screening with a mammogram is recommended biennially  for women age 54-69. Screening for cervical cancer with a pap smear and pelvic exam is recommended for women after age 72 years every 2 years up to age 79 or when the provider and patient decide to stop. If there is a history of cervical abnormalities or other increased risk for cancer then the test is recommended yearly. Hepatitis C screening is also recommended for anyone born between [birth year] through [address]. A shingles vaccine is also recommended once in a lifetime after age 61. Your Medicare Wellness Exam is recommended annually. Here is a list of your current Health Maintenance items with a due date: 
Health Maintenance Due Topic Date Due  Bone Mineral Density   08/25/2006  Glaucoma Screening   06/12/2015 Brooklynn Travis Annual Well Visit  04/18/2018 If you have any questions regarding [employer], you may call [employer] support at [phone]. If you have any questions regarding [facility], you may call [facility] support at [phone]. Patient Instructions History Introducing [facility]! Idalia Demondkee introduces [employer] patient portal. Now you can access parts of your medical record, email your doctor's office, and request medication refills online. 1. In your internet browser, go to [email or web address] 2. Click on the First Time User? Click Here link in the Sign In box. You will see the New Member Sign Up page. 3. Enter your [employer] Access Code exactly as it appears below. You will not need to use this code after youve completed the sign-up process. If you do not sign up before the expiration date, you must request a new code. · [employer] Access Code: [email or web address] Expires: 8/13/2018  9:21 AM 
 
4. Enter the last four digits of your Social Security Number (xxxx) and Date of Birth (mm/dd/yyyy) as indicated and click Submit. You will be taken to the next sign-up page. 5. Create a [employer] ID. This will be your [employer] login ID and cannot be changed, so think of one that is secure and easy to remember. 6. Create a [employer] password. You can change your password at any time. 7. Enter your Password Reset Question and Answer. This can be used at a later time if you forget your password. 8. Enter your e-mail address. You will receive e-mail notification when new information is available in [address] 9. Click Sign Up. You can now view and download portions of your medical record. 10. Click the Download Summary menu link to download a portable copy of your medical information. If you have questions, please visit the Frequently Asked Questions section of the [employer] website. Remember, [employer] is NOT to be used for urgent needs. For medical emergencies, dial 911. Now available from your iPhone and Android! Please provide this summary of care documentation to your next provider. Your primary care clinician is listed as Nadja Tellez. If you have any questions after today's visit, please call [phone].

## 2018-05-16 LAB
ALBUMIN SERPL-MCNC: 3.9 G/DL (ref 3.5–4.8)
ALBUMIN/GLOB SERPL: 1.6 {RATIO} (ref 1.2–2.2)
ALP SERPL-CCNC: 70 IU/L (ref 39–117)
ALT SERPL-CCNC: 21 IU/L (ref 0–32)
AST SERPL-CCNC: 20 IU/L (ref 0–40)
BASOPHILS # BLD AUTO: 0 X10E3/UL (ref 0–0.2)
BASOPHILS NFR BLD AUTO: 1 %
BILIRUB SERPL-MCNC: 0.3 MG/DL (ref 0–1.2)
BUN SERPL-MCNC: 16 MG/DL (ref 8–27)
BUN/CREAT SERPL: 19 (ref 12–28)
CALCIUM SERPL-MCNC: 9.2 MG/DL (ref 8.7–10.3)
CANCER AG125 SERPL-ACNC: 14 U/ML (ref 0–38.1)
CHLORIDE SERPL-SCNC: 95 MMOL/L (ref 96–106)
CHOLEST SERPL-MCNC: 133 MG/DL (ref 100–199)
CO2 SERPL-SCNC: 25 MMOL/L (ref 18–29)
CREAT SERPL-MCNC: 0.83 MG/DL (ref 0.57–1)
EOSINOPHIL # BLD AUTO: 0.4 X10E3/UL (ref 0–0.4)
EOSINOPHIL NFR BLD AUTO: 6 %
ERYTHROCYTE [DISTWIDTH] IN BLOOD BY AUTOMATED COUNT: 14.6 % (ref 12.3–15.4)
EST. AVERAGE GLUCOSE BLD GHB EST-MCNC: 128 MG/DL
GFR SERPLBLD CREATININE-BSD FMLA CKD-EPI: 69 ML/MIN/1.73
GFR SERPLBLD CREATININE-BSD FMLA CKD-EPI: 79 ML/MIN/1.73
GLOBULIN SER CALC-MCNC: 2.4 G/DL (ref 1.5–4.5)
GLUCOSE SERPL-MCNC: 104 MG/DL (ref 65–99)
HBA1C MFR BLD: 6.1 % (ref 4.8–5.6)
HCT VFR BLD AUTO: 37.9 % (ref 34–46.6)
HDLC SERPL-MCNC: 57 MG/DL
HGB BLD-MCNC: 12.2 G/DL (ref 11.1–15.9)
IMM GRANULOCYTES # BLD: 0 X10E3/UL (ref 0–0.1)
IMM GRANULOCYTES NFR BLD: 0 %
LDLC SERPL CALC-MCNC: 41 MG/DL (ref 0–99)
LYMPHOCYTES # BLD AUTO: 2.3 X10E3/UL (ref 0.7–3.1)
LYMPHOCYTES NFR BLD AUTO: 34 %
MCH RBC QN AUTO: 27.9 PG (ref 26.6–33)
MCHC RBC AUTO-ENTMCNC: 32.2 G/DL (ref 31.5–35.7)
MCV RBC AUTO: 87 FL (ref 79–97)
MONOCYTES # BLD AUTO: 0.7 X10E3/UL (ref 0.1–0.9)
MONOCYTES NFR BLD AUTO: 10 %
NEUTROPHILS # BLD AUTO: 3.5 X10E3/UL (ref 1.4–7)
NEUTROPHILS NFR BLD AUTO: 49 %
PLATELET # BLD AUTO: 312 X10E3/UL (ref 150–379)
POTASSIUM SERPL-SCNC: 4.8 MMOL/L (ref 3.5–5.2)
PROT SERPL-MCNC: 6.3 G/DL (ref 6–8.5)
RBC # BLD AUTO: 4.38 X10E6/UL (ref 3.77–5.28)
SODIUM SERPL-SCNC: 134 MMOL/L (ref 134–144)
TRIGL SERPL-MCNC: 173 MG/DL (ref 0–149)
VLDLC SERPL CALC-MCNC: 35 MG/DL (ref 5–40)
WBC # BLD AUTO: 6.9 X10E3/UL (ref 3.4–10.8)

## 2018-05-17 LAB — BACTERIA UR CULT: NORMAL

## 2018-05-17 NOTE — PROGRESS NOTES
Only mixed urogenital santos on urine culture. No need for antibiotic. Cancer  is still in range at 14. Kidneys are improved from last check. Chloride still slightly low. Liver looks good. Blood count is normal.  A1c is still in pre-diabetic range. Continue to watch diet. Cholesterol and triglycerides are improved. Really good report.

## 2018-05-18 ENCOUNTER — TELEPHONE (OUTPATIENT)
Dept: FAMILY MEDICINE CLINIC | Age: 78
End: 2018-05-18

## 2018-05-18 NOTE — TELEPHONE ENCOUNTER
Unable to reach patient by phone to give lab results. Left message to return my call at their earliest convenience.

## 2018-05-18 NOTE — TELEPHONE ENCOUNTER
----- Message from Alin Ureña NP sent at 5/17/2018  8:35 AM EDT -----  Only mixed urogenital santos on urine culture. No need for antibiotic. Cancer  is still in range at 14. Kidneys are improved from last check. Chloride still slightly low. Liver looks good. Blood count is normal.  A1c is still in pre-diabetic range. Continue to watch diet. Cholesterol and triglycerides are improved. Really good report.

## 2018-05-25 ENCOUNTER — TELEPHONE (OUTPATIENT)
Dept: FAMILY MEDICINE CLINIC | Age: 78
End: 2018-05-25

## 2018-05-25 NOTE — TELEPHONE ENCOUNTER
She has been having muscle spasms at night. Mostly in her legs. Does not know if it is medication related. Plan to hold Lipitor for 2 weeks. If improves, we may need to decrease or change to another cholesterol medication.

## 2018-05-25 NOTE — TELEPHONE ENCOUNTER
Would like to speak with Jenna. Having increased muscle spasms. Questioning the medication may be causing it.

## 2018-06-11 ENCOUNTER — DOCUMENTATION ONLY (OUTPATIENT)
Dept: FAMILY MEDICINE CLINIC | Age: 78
End: 2018-06-11

## 2018-06-12 ENCOUNTER — TELEPHONE (OUTPATIENT)
Dept: FAMILY MEDICINE CLINIC | Age: 78
End: 2018-06-12

## 2018-06-12 DIAGNOSIS — E78.00 PURE HYPERCHOLESTEROLEMIA: Primary | ICD-10-CM

## 2018-06-12 RX ORDER — ROSUVASTATIN CALCIUM 5 MG/1
5 TABLET, COATED ORAL
Qty: 90 TAB | Refills: 3 | Status: SHIPPED | OUTPATIENT
Start: 2018-06-12 | End: 2018-06-28 | Stop reason: SDUPTHER

## 2018-06-12 NOTE — TELEPHONE ENCOUNTER
Patient called in stating cramps in hands and legs were a lot better since finishing the Lipitor. Patient stated had discussed switching to new medication with NP and would like new medication sent to [facility].

## 2018-06-14 ENCOUNTER — TELEPHONE (OUTPATIENT)
Dept: FAMILY MEDICINE CLINIC | Age: 78
End: 2018-06-14

## 2018-06-14 PROBLEM — M85.89 OSTEOPENIA OF MULTIPLE SITES: Status: ACTIVE | Noted: 2018-06-14

## 2018-06-14 NOTE — TELEPHONE ENCOUNTER
----- Message from Elo Martinez NP sent at 6/14/2018  2:13 PM EDT -----  Osteopenia range:  10-year probability of a hip fracture 2.2% and a 10-year probability of a major osteoporosis-related fracture 11% based on the [place]-adapted WHO algorithm. Calcium level was good at our last check. Will plan to check vitamin D with next labs. No additional meds at this time.   Work on [employer] and walking

## 2018-06-28 DIAGNOSIS — E78.00 PURE HYPERCHOLESTEROLEMIA: ICD-10-CM

## 2018-06-28 RX ORDER — ROSUVASTATIN CALCIUM 5 MG/1
5 TABLET, COATED ORAL
Qty: 90 TAB | Refills: 3 | Status: SHIPPED | OUTPATIENT
Start: 2018-06-28 | End: 2019-06-06 | Stop reason: SDUPTHER

## 2018-07-03 DIAGNOSIS — F51.01 PRIMARY INSOMNIA: ICD-10-CM

## 2018-07-03 RX ORDER — ZOLPIDEM TARTRATE 5 MG/1
5 TABLET ORAL
Qty: 90 TAB | Refills: 3 | Status: SHIPPED | OUTPATIENT
Start: 2018-07-03 | End: 2018-12-21 | Stop reason: SDUPTHER

## 2018-07-17 ENCOUNTER — OFFICE VISIT (OUTPATIENT)
Dept: FAMILY MEDICINE CLINIC | Age: 78
End: 2018-07-17

## 2018-07-17 VITALS
HEIGHT: 62 IN | TEMPERATURE: 98.9 F | WEIGHT: 149 LBS | RESPIRATION RATE: 14 BRPM | BODY MASS INDEX: 27.42 KG/M2 | OXYGEN SATURATION: 100 % | HEART RATE: 65 BPM | DIASTOLIC BLOOD PRESSURE: 60 MMHG | SYSTOLIC BLOOD PRESSURE: 150 MMHG

## 2018-07-17 DIAGNOSIS — K57.32 DIVERTICULITIS OF LARGE INTESTINE WITHOUT PERFORATION OR ABSCESS WITHOUT BLEEDING: Primary | ICD-10-CM

## 2018-07-17 DIAGNOSIS — R10.32 LLQ ABDOMINAL PAIN: ICD-10-CM

## 2018-07-17 RX ORDER — CIPROFLOXACIN 500 MG/1
500 TABLET ORAL 2 TIMES DAILY
Qty: 14 TAB | Refills: 0 | Status: SHIPPED | OUTPATIENT
Start: 2018-07-17 | End: 2018-08-31

## 2018-07-17 RX ORDER — METRONIDAZOLE 500 MG/1
500 TABLET ORAL 2 TIMES DAILY
Qty: 14 TAB | Refills: 0 | Status: SHIPPED | OUTPATIENT
Start: 2018-07-17 | End: 2018-08-31

## 2018-07-17 NOTE — PATIENT INSTRUCTIONS
Diverticulitis: Care Instructions Your Care Instructions Diverticulitis occurs when pouches form in the wall of the colon and become inflamed or infected. It can be very painful. Doctors aren't sure what causes diverticulitis. There is no proof that foods such as nuts, seeds, or berries cause it or make it worse. A low-fiber diet may cause the colon to work harder to push stool forward. Pouches may form because of this extra work. It may be hard to think about healthy eating while you're in pain. But as you recover, you might think about how you can use healthy eating for overall better health. Healthy eating may help you avoid future attacks. Follow-up care is a key part of your treatment and safety. Be sure to make and go to all appointments, and call your doctor if you are having problems. It's also a good idea to know your test results and keep a list of the medicines you take. How can you care for yourself at home? · Drink plenty of fluids, enough so that your urine is light yellow or clear like water. If you have kidney, heart, or liver disease and have to limit fluids, talk with your doctor before you increase the amount of fluids you drink. · Stick to liquids or a bland diet (plain rice, bananas, dry toast or crackers, applesauce) until you are feeling better. Then you can return to regular foods and gradually increase the amount of fiber in your diet. · Use a heating pad set on low on your belly to relieve mild cramps and pain. · Get extra rest until you are feeling better. · Be safe with medicines. Read and follow all instructions on the label. ¨ If the doctor gave you a prescription medicine for pain, take it as prescribed. ¨ If you are not taking a prescription pain medicine, ask your doctor if you can take an over-the-counter medicine. · If your doctor prescribed antibiotics, take them as directed. Do not stop taking them just because you feel better.  You need to take the full course of antibiotics. To prevent future attacks of diverticulitis · Avoid constipation: 
¨ Include fruits, vegetables, beans, and whole grains in your diet each day. These foods are high in fiber. ¨ Drink plenty of fluids, enough so that your urine is light yellow or clear like water. If you have kidney, heart, or liver disease and have to limit fluids, talk with your doctor before you increase the amount of fluids you drink. ¨ Get some exercise every day. Build up slowly to 30 to 60 minutes a day on 5 or more days of the week. ¨ Take a fiber supplement, such as Citrucel or Metamucil, every day if needed. Read and follow all instructions on the label. ¨ Schedule time each day for a bowel movement. Having a daily routine may help. Take your time and do not strain when having a bowel movement. When should you call for help? Call your doctor now or seek immediate medical care if: 
  · You have a fever.  
  · You are vomiting.  
  · You have new or worse belly pain.  
  · You cannot pass stools or gas.  
 Watch closely for changes in your health, and be sure to contact your doctor if you have any problems. Where can you learn more? Go to http://gonzalo-donny.info/. Enter H901 in the search box to learn more about \"Diverticulitis: Care Instructions. \" Current as of: May 12, 2017 Content Version: 11.7 © 7037-5726 [employer]. Care instructions adapted under license by [employer] (which disclaims liability or warranty for this information). If you have questions about a medical condition or this instruction, always ask your healthcare professional. [place] [address] any warranty or liability for your use of this information.

## 2018-07-17 NOTE — MR AVS SNAPSHOT
[address] [facility] 
 
 
 [address] [facility],5Th Floor [facility] [phone] Patient: Mary Severino MRN: [id number] Janet [place] Visit Information Date & Time Provider Department Dept. Phone Encounter #  
 7/17/2018  9:30 AM Araceli Guzman MD Demetrius Arvizu [id number] Follow-up Instructions Return in about 4 weeks (around 8/14/2018). Follow-up and Disposition History Your Appointments 8/13/2018  9:30 AM  
New Patient with Bill Riojas MD  
[facility]) Appt Note: NP: Neuropathy, Patient would like to be seen in [facility] location by any available provider. Has neuropathy and was unable to tolerate gabapentin on Lyrica. Would like second opinion. [address] [id number] [address] [place]  
  
   
 [address] [phone] Upcoming Health Maintenance Date Due  
 GLAUCOMA SCREENING Q2Y 6/12/2015 Influenza Age 5 to Adult 8/1/2018 MEDICARE YEARLY EXAM 5/16/2019 DTaP/Tdap/Td series (2 - Td) 8/25/2027 Allergies as of 7/17/2018  Review Complete On: 7/17/2018 By: Araceli Guzman MD  
  
 Severity Noted Reaction Type Reactions Feldene [Piroxicam]  02/11/2016    Other (comments) Stomach problem Toradol [Ketorolac Tromethamine]  06/10/2013    Itching Voltaren [Diclofenac Sodium]  06/10/2013    Diarrhea Current Immunizations  Reviewed on 9/1/2017 Name Date Pneumococcal Conjugate (PCV-13) 11/13/2015 Pneumococcal Polysaccharide (PPSV-23) 10/29/2004 Zoster Vaccine, Live 1/31/2012 Not reviewed this visit You Were Diagnosed With   
  
 Codes Comments Diverticulitis of large intestine without perforation or abscess without bleeding    -  Primary ICD-10-CM: K57.32 
ICD-9-CM: 562.11   
 LLQ abdominal pain     ICD-10-CM: R10.32 
ICD-9-CM: 789.04 Vitals BP Pulse Temp Resp Height(growth percentile) Weight(growth percentile) 150/60 (BP 1 Location: Right arm, BP Patient Position: Sitting) 65 98.9 °F (37.2 °C) (Oral) 14 5' 2\" (1.575 m) 149 lb (67.6 kg) SpO2 BMI OB Status Smoking Status 100% 27.25 kg/m2 Postmenopausal Former Smoker BMI and BSA Data Body Mass Index Body Surface Area  
 27.25 kg/m 2 1.72 m 2 Preferred Pharmacy Pharmacy Name Phone [facility] [facility] AT [facility] OF  [address] & [address] [facility]rey [phone] Your Updated Medication List  
  
   
This list is accurate as of 7/17/18 12:13 PM.  Always use your most recent med list. amLODIPine 10 mg tablet Commonly known as:  Martell Alstrom TAKE 1 TABLET BY MOUTH  DAILY. INDICATIONS:  PRESSURE  
  
 cholestyramine-sucrose 4 gram powder Commonly known as:  Beverli Mountain Take 4 g by mouth two (2) times daily (with meals). ciprofloxacin HCl 500 mg tablet Commonly known as:  CIPRO Take 1 Tab by mouth two (2) times a day. losartan-hydroCHLOROthiazide 100-12.5 mg per tablet Commonly known as:  HYZAAR  
TAKE 1 TABLET BY MOUTH  DAILY. INDICATIONS:  PRESSURE  
  
 metoprolol tartrate 50 mg tablet Commonly known as:  LOPRESSOR  
TAKE 1 TABLET BY MOUTH TWO  TIMES A DAY. INDICATIONS:  PRESSURE AND HEART  
  
 metroNIDAZOLE 500 mg tablet Commonly known as:  FLAGYL Take 1 Tab by mouth two (2) times a day. omeprazole 20 mg capsule Commonly known as:  PRILOSEC  
TAKE 1 CAPSULE BY MOUTH TWO TIMES DAILY INDICATIONS:  PREVENTION OF NSAID-INDUCED GASTRIC ULCER  
  
 rosuvastatin 5 mg tablet Commonly known as:  CRESTOR Take 1 Tab by mouth nightly. Indications: hyperlipidemia  
  
 spironolactone 25 mg tablet Commonly known as:  ALDACTONE  
TAKE 1/2 TABLET BY MOUTH DAILY  
  
 TYLENOL ARTHRITIS PAIN 650 mg Tber Generic drug:  acetaminophen Take 650 mg by mouth every six (6) hours as needed for Pain.  
  
 zolpidem 5 mg tablet Commonly known as:  AMBIEN Take 1 Tab by mouth nightly as needed for Sleep. Max Daily Amount: 5 mg. Indications: Sleep Prescriptions Sent to Pharmacy Refills  
 ciprofloxacin HCl (CIPRO) 500 mg tablet 0 Sig: Take 1 Tab by mouth two (2) times a day. Class: Normal  
 Pharmacy: [facility] [address] [address], [address] [place] [address] Dr [address] Ph #: [phone] Route: Oral  
 metroNIDAZOLE (FLAGYL) 500 mg tablet 0 Sig: Take 1 Tab by mouth two (2) times a day. Class: Normal  
 Pharmacy: [facility] [address]well Ph #: [phone] Route: Oral  
  
Follow-up Instructions Return in about 4 weeks (around 8/14/2018). Patient Instructions Diverticulitis: Care Instructions Your Care Instructions Diverticulitis occurs when pouches form in the wall of the colon and become inflamed or infected. It can be very painful. Doctors aren't sure what causes diverticulitis. There is no proof that foods such as nuts, seeds, or berries cause it or make it worse. A low-fiber diet may cause the colon to work harder to push stool forward. Pouches may form because of this extra work. It may be hard to think about healthy eating while you're in pain. But as you recover, you might think about how you can use healthy eating for overall better health. Healthy eating may help you avoid future attacks. Follow-up care is a key part of your treatment and safety. Be sure to make and go to all appointments, and call your doctor if you are having problems. It's also a good idea to know your test results and keep a list of the medicines you take. How can you care for yourself at home?  
· Drink plenty of fluids, enough so that your urine is light yellow or clear like water. If you have kidney, heart, or liver disease and have to limit fluids, talk with your doctor before you increase the amount of fluids you drink. · Stick to liquids or a bland diet (plain rice, bananas, dry toast or crackers, applesauce) until you are feeling better. Then you can return to regular foods and gradually increase the amount of fiber in your diet. · Use a heating pad set on low on your belly to relieve mild cramps and pain. · Get extra rest until you are feeling better. · Be safe with medicines. Read and follow all instructions on the label. ¨ If the doctor gave you a prescription medicine for pain, take it as prescribed. ¨ If you are not taking a prescription pain medicine, ask your doctor if you can take an over-the-counter medicine. · If your doctor prescribed antibiotics, take them as directed. Do not stop taking them just because you feel better. You need to take the full course of antibiotics. To prevent future attacks of diverticulitis · Avoid constipation: 
¨ Include fruits, vegetables, beans, and whole grains in your diet each day. These foods are high in fiber. ¨ Drink plenty of fluids, enough so that your urine is light yellow or clear like water. If you have kidney, heart, or liver disease and have to limit fluids, talk with your doctor before you increase the amount of fluids you drink. ¨ Get some exercise every day. Build up slowly to 30 to 60 minutes a day on 5 or more days of the week. ¨ Take a fiber supplement, such as Citrucel or Metamucil, every day if needed. Read and follow all instructions on the label. ¨ Schedule time each day for a bowel movement. Having a daily routine may help. Take your time and do not strain when having a bowel movement. When should you call for help? Call your doctor now or seek immediate medical care if: 
  · You have a fever.  
  · You are vomiting.  
  · You have new or worse belly pain.  
  · You cannot pass stools or gas.  Watch closely for changes in your health, and be sure to contact your doctor if you have any problems. Where can you learn more? Go to http://gonzalo-donny.info/. Enter H901 in the search box to learn more about \"Diverticulitis: Care Instructions. \" Current as of: May 12, 2017 Content Version: 11.7 © 8648-2945 "[employer]. Care instructions adapted under license by [employer] (which disclaims liability or warranty for this information). If you have questions about a medical condition or this instruction, always ask your healthcare professional. [address] any warranty or liability for your use of this information. Introducing [facility] & HEALTH SERVICES! [employer] introduces [employer] patient portal. Now you can access parts of your medical record, email your doctor's office, and request medication refills online. 1. In your internet browser, go to [email or web address]. [email or web address] 2. Click on the First Time User? Click Here link in the Sign In box. You will see the New Member Sign Up page. 3. Enter your [employer] Access Code exactly as it appears below. You will not need to use this code after youve completed the sign-up process. If you do not sign up before the expiration date, you must request a new code. · [employer] Access Code: [email or web address] Expires: 8/13/2018  9:21 AM 
 
4. Enter the last four digits of your Social Security Number (xxxx) and Date of Birth (mm/dd/yyyy) as indicated and click Submit. You will be taken to the next sign-up page. 5. Create a [employer] ID. This will be your [employer] login ID and cannot be changed, so think of one that is secure and easy to remember. 6. Create a [employer] password. You can change your password at any time. 7. Enter your Password Reset Question and Answer. This can be used at a later time if you forget your password. 8. Enter your e-mail address. You will receive e-mail notification when new information is available in [address] 9. Click Sign Up. You can now view and download portions of your medical record. 10. Click the Download Summary menu link to download a portable copy of your medical information. If you have questions, please visit the Frequently Asked Questions section of the [employer] website. Remember, [employer] is NOT to be used for urgent needs. For medical emergencies, dial 911. Now available from your iPhone and Android! Please provide this summary of care documentation to your next provider. Your primary care clinician is listed as Salome Tellez. If you have any questions after today's visit, please call [phone].

## 2018-07-17 NOTE — PROGRESS NOTES
1. Have you been to the ER, urgent care clinic since your last visit? Hospitalized since your last visit? Yes    2. Have you seen or consulted any other health care providers outside of the [facility] since your last visit? Include any pap smears or colon screening.  No

## 2018-07-17 NOTE — PROGRESS NOTES
Wilma March is a 69 y.o. female presenting for/with:    Nausea (COLON ISSUES PER PT)    HPI:  Symptoms include LLQ abd pain for past week. gradually worsening since that time. Evaluation to date: seen previously and dx with diverticulitis a couple years ago. tx with cipro and flagyl at that time and santos well. Had some left over and r/s that 3d ago. Feeling a little better, but is queasy. No BRBPR or melena. No n/v. Santos fluids ok. PMH, SH, Medications/Allergies: reviewed, on chart. ROS:  Constitutional: POS malaise, feeling cold, but No measured fever, no shaking chills or sig weight loss  Respiratory: No cough, SOB   CV: No chest pain or Palpitations    Visit Vitals    /60 (BP 1 Location: Right arm, BP Patient Position: Sitting)    Pulse 65    Temp 98.9 °F (37.2 °C) (Oral)    Resp 14    Ht 5' 2\" (1.575 m)    Wt 149 lb (67.6 kg)    SpO2 100%    BMI 27.25 kg/m2     Wt Readings from Last 3 Encounters:   07/17/18 149 lb (67.6 kg)   06/11/18 150 lb (68 kg)   05/15/18 150 lb (68 kg)     Physical Examination: General appearance - alert, well appearing, and in no distress  Mental status - alert, oriented to person, place, and time  Eyes - pupils equal and reactive, extraocular eye movements intact  ENT - bilateral external ears and nose normal. Normal lips  Neck - supple, no significant adenopathy, no thyromegaly or mass  Lymphatics - no palpable lymphadenopathy, no hepatosplenomegaly  Chest - clear to auscultation, no wheezes, rales or rhonchi, symmetric air entry  Heart - normal rate, regular rhythm, normal S1, S2, no murmurs, rubs, clicks or gallops  Abd - NABS. Soft. Mild to mod ttp to LLQ without G/R/R. No HSM/Mass. Extremities - peripheral pulses normal, no pedal edema, no clubbing or cyanosis    CT Abd/pelvis reviewed from 2015, showed extensive sigmoid diverticulosis. A/P:  LLQ abd pain, c/w sigmoid diverticulosis.   No fever today, seems to be improving with abx, so will just RF cipro and gregory for now. Pt has some anti-emetic at home, can use that. Work on semanticlabs, get some soluble fiber.

## 2018-08-13 ENCOUNTER — OFFICE VISIT (OUTPATIENT)
Dept: NEUROLOGY | Age: 78
End: 2018-08-13

## 2018-08-13 ENCOUNTER — TELEPHONE (OUTPATIENT)
Dept: NEUROLOGY | Age: 78
End: 2018-08-13

## 2018-08-13 VITALS
BODY MASS INDEX: 27.42 KG/M2 | HEART RATE: 73 BPM | DIASTOLIC BLOOD PRESSURE: 84 MMHG | HEIGHT: 62 IN | SYSTOLIC BLOOD PRESSURE: 139 MMHG | OXYGEN SATURATION: 100 % | WEIGHT: 149 LBS

## 2018-08-13 DIAGNOSIS — E78.2 MIXED HYPERLIPIDEMIA: ICD-10-CM

## 2018-08-13 DIAGNOSIS — Z85.43 HISTORY OF OVARIAN CANCER: ICD-10-CM

## 2018-08-13 DIAGNOSIS — G62.9 NEUROPATHY: Primary | ICD-10-CM

## 2018-08-13 DIAGNOSIS — I10 ESSENTIAL HYPERTENSION: ICD-10-CM

## 2018-08-13 RX ORDER — ASPIRIN 81 MG/1
TABLET ORAL DAILY
COMMUNITY
End: 2019-09-26 | Stop reason: ALTCHOICE

## 2018-08-13 RX ORDER — CAPSAICIN 0.1 %
CREAM (GRAM) TOPICAL
Qty: 1 TUBE | Refills: 5 | Status: SHIPPED | OUTPATIENT
Start: 2018-08-13 | End: 2018-10-02

## 2018-08-13 RX ORDER — ATORVASTATIN CALCIUM 20 MG/1
TABLET, FILM COATED ORAL DAILY
COMMUNITY
End: 2018-10-02 | Stop reason: SINTOL

## 2018-08-13 NOTE — PATIENT INSTRUCTIONS
Office Policies  o Phone calls/patient messages:  Please allow up to 24 hours for someone in the office to contact you about your call or message. Be mindful your provider may be out of the office or your message may require further review. We encourage you to use [employer] for your messages as this is a faster, more efficient way to communicate with our office  o Medication Refills:  Prescription medications require up to 48 business hours to process. We encourage you to use [employer] for your refills. For controlled medications: Please allow up to 72 business hours to process. Certain medications may require you to  a written prescription at our office. NO narcotic/controlled medications will be prescribed after 4pm Monday through Friday or on weekends  o Form/Paperwork Completion:  Please note there is a $25 fee for all paperwork completed by our providers. We ask that you allow 7-14 business days. Pre-payment is due prior to picking up/faxing the completed form. You may also download your forms to [employer] to have your doctor print off.  o Test Results: In order for a patient to obtain test results, an appointment must be made with the physician to review the results. Test results cannot be discussed over the phone.

## 2018-08-13 NOTE — LETTER
8/14/2018 8:04 AM 
 
Patient:  Dennis Méndez YOB: 1940 Date of Visit: 8/13/2018 Dear Renee Thomas MD 
[address] [facility] [address] [facility],5Th [address] [address] VIA In Basket Damien Lacy NP 
[address] [facility] [address] [facility],5[address] [address] VIA In Basket 
 : Thank you for referring Ms. Storm Nieves to me for evaluation/treatment. Below are the relevant portions of my assessment and plan of care. HISTORY OF PRESENT ILLNESS 
Dennis Méndez is a 69 y.o. female. HPI Comments: This patient is a 67-year-old right-handed female who is here today for burning feet. She does state that if the light is poor she has to be very careful walking however she is not sure whether it is due to her vision or the lack of sensation in her feet or both. She has a history of ovarian cancer diagnosed in 2003. She states that her feet have been burning for a number of years. They are getting slowly worse. They bother her more when she is sitting chair especially bad when she gets in the bed at night. She denies any problems with her bowels or bladder that she does not relate to age. Nothing seems to make her feet better and nothing seems to make her feel worse. She has significant hypertension and has hyperlipidemia. New Patient The history is provided by the patient and relative. This is a chronic problem. Review of Systems Constitutional:  
     Review of systems is positive for constipation, some diarrhea, fatigue with exertion, hearing loss worse on the left, joint pain, muscle cramps occasionally, burning feet, shortness of breath with exertion. Complete review of systems done although is negative Current Outpatient Prescriptions on File Prior to Visit Medication Sig Dispense Refill  zolpidem (AMBIEN) 5 mg tablet Take 1 Tab by mouth nightly as needed for Sleep. Max Daily Amount: 5 mg.  Indications: Sleep 90 Tab 3  
 omeprazole (PRILOSEC) 20 mg capsule TAKE 1 CAPSULE BY MOUTH TWO TIMES DAILY INDICATIONS:  PREVENTION OF NSAID-INDUCED GASTRIC ULCER 180 Cap 0  
 spironolactone (ALDACTONE) 25 mg tablet TAKE 1/2 TABLET BY MOUTH DAILY 45 Tab 0  cholestyramine-sucrose (QUESTRAN) 4 gram powder Take 4 g by mouth two (2) times daily (with meals). 6 Can 3  
 amLODIPine (NORVASC) 10 mg tablet TAKE 1 TABLET BY MOUTH  DAILY. INDICATIONS:  PRESSURE 90 Tab 3  
 metoprolol tartrate (LOPRESSOR) 50 mg tablet TAKE 1 TABLET BY MOUTH TWO  TIMES A DAY. INDICATIONS:  PRESSURE AND HEART 180 Tab 3  
 losartan-hydroCHLOROthiazide (HYZAAR) 100-12.5 mg per tablet TAKE 1 TABLET BY MOUTH  DAILY. INDICATIONS:  PRESSURE 90 Tab 3  ciprofloxacin HCl (CIPRO) 500 mg tablet Take 1 Tab by mouth two (2) times a day. 14 Tab 0  
 metroNIDAZOLE (FLAGYL) 500 mg tablet Take 1 Tab by mouth two (2) times a day. 14 Tab 0  
 rosuvastatin (CRESTOR) 5 mg tablet Take 1 Tab by mouth nightly. Indications: hyperlipidemia 90 Tab 3  
 acetaminophen (TYLENOL ARTHRITIS PAIN) 650 mg CR tablet Take 650 mg by mouth every six (6) hours as needed for Pain. No current facility-administered medications on file prior to visit. Past Medical History:  
Diagnosis Date  Arthritis  Cancer ([facility] [address]   
 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  
 Chicken pox 1962  Contact dermatitis and other eczema, due to unspecified cause  Cystocele 2011  
 grade I  
 Diverticulosis 1985  Duodenal ulcer  Eye disorder 06/95  
 recurrent erosion of L eye  GERD (gastroesophageal reflux disease)  [facility] measles 1964  Hypercholesterolemia  Hypertension  Menarche   
 onset at age 16  
 Menopause   
 onset at age 47  Neuropathy  Ovarian cancer ([facility] [address] 07/03 Poorly diff.  Postmenopausal vaginal bleeding 9/19/2013 Due to Granulation tissue from her pessary, treated w/ silver nitrate stick on 9.19.2013  Presence of pessary #2  PARUL (stress urinary incontinence, female) Family History Problem Relation Age of Onset  Stroke Mother  Heart Disease Father Social History Substance Use Topics  Smoking status: Former Smoker  Smokeless tobacco: Never Used  Alcohol use 0.0 oz/week  
  0 Standard drinks or equivalent per week Comment: minimal  
 
/84  Pulse 73  Ht 5' 2\" (1.575 m)  Wt 149 lb (67.6 kg)  SpO2 100%  BMI 27.25 kg/m2 Physical Exam  
Constitutional: She is oriented to person, place, and time. She appears well-developed and well-nourished. No distress. HENT:  
Head: Normocephalic. Mouth/Throat: Oropharynx is clear and moist. No oropharyngeal exudate. Eyes: Conjunctivae and EOM are normal. Pupils are equal, round, and reactive to light. No scleral icterus. Neck: Normal range of motion. Neck supple. Thyromegaly present. Musculoskeletal: Normal range of motion. She exhibits no edema or deformity. Her feet are tender to any touch or to weightbearing Lymphadenopathy:  
  She has no cervical adenopathy. Neurological: She is alert and oriented to person, place, and time. She has normal reflexes. No cranial nerve deficit. She exhibits normal muscle tone. Coordination normal.  
She has 1+ deep tendon reflexes in her upper extremities trace at her knees and absent at her ankles. She has marked decreased vibratory sensation in her feet. Fine touch is slightly decreased. Toes are downgoing bilaterally. Skin: Skin is warm and dry. No rash noted. She is not diaphoretic. No erythema. Psychiatric: She has a normal mood and affect. Her behavior is normal. Judgment and thought content normal.  
 
 
ASSESSMENT and PLAN 
IDEOPATHIC PERIPHERAL NEUROPATHY This neuropathy is probably just an idiopathic peripheral neuropathy but could be related to treatment for ovarian cancer or the cancer itself.   I am not anxious to put her on any of the usual medications will use for pain control at this time. She is on and off a lot of medication already. I am going to try her on some Zostrix-HP cream which she will apply to her feet initially once a night before she goes to bed however eventually I would like to see her applying it 3 times a day. This is a neurotransmitter depleter. I have recommended she follow-up with nurse practitioner would at the [facility] neurology clinic. HYPERTENSION Stroke risk, stable, managed by primary care HYPERLIPIDEMIA Stroke risk, stable, managed by primary care OVARIAN CANCER Managed by oncology and primary care. If you have questions, please do not hesitate to call me. I look forward to following Ms. Adkins along with you. Sincerely, Martha Turcios MD

## 2018-08-14 NOTE — PROGRESS NOTES
HISTORY OF PRESENT ILLNESS  Mora Araiza is a 69 y.o. female. HPI Comments: This patient is a 60-year-old right-handed female who is here today for burning feet. She does state that if the light is poor she has to be very careful walking however she is not sure whether it is due to her vision or the lack of sensation in her feet or both. She has a history of ovarian cancer diagnosed in 2003. She states that her feet have been burning for a number of years. They are getting slowly worse. They bother her more when she is sitting chair especially bad when she gets in the bed at night. She denies any problems with her bowels or bladder that she does not relate to age. Nothing seems to make her feet better and nothing seems to make her feel worse. She has significant hypertension and has hyperlipidemia. New Patient   The history is provided by the patient and relative. This is a chronic problem. Review of Systems   Constitutional:        Review of systems is positive for constipation, some diarrhea, fatigue with exertion, hearing loss worse on the left, joint pain, muscle cramps occasionally, burning feet, shortness of breath with exertion. Complete review of systems done although is negative     Current Outpatient Prescriptions on File Prior to Visit   Medication Sig Dispense Refill    zolpidem (AMBIEN) 5 mg tablet Take 1 Tab by mouth nightly as needed for Sleep. Max Daily Amount: 5 mg. Indications: Sleep 90 Tab 3    omeprazole (PRILOSEC) 20 mg capsule TAKE 1 CAPSULE BY MOUTH TWO TIMES DAILY INDICATIONS:  PREVENTION OF NSAID-INDUCED GASTRIC ULCER 180 Cap 0    spironolactone (ALDACTONE) 25 mg tablet TAKE 1/2 TABLET BY MOUTH DAILY 45 Tab 0    cholestyramine-sucrose (QUESTRAN) 4 gram powder Take 4 g by mouth two (2) times daily (with meals). 6 Can 3    amLODIPine (NORVASC) 10 mg tablet TAKE 1 TABLET BY MOUTH  DAILY.  INDICATIONS:  PRESSURE 90 Tab 3    metoprolol tartrate (LOPRESSOR) 50 mg tablet TAKE 1 TABLET BY MOUTH TWO  TIMES A DAY. INDICATIONS:  PRESSURE AND HEART 180 Tab 3    losartan-hydroCHLOROthiazide (HYZAAR) 100-12.5 mg per tablet TAKE 1 TABLET BY MOUTH  DAILY. INDICATIONS:  PRESSURE 90 Tab 3    ciprofloxacin HCl (CIPRO) 500 mg tablet Take 1 Tab by mouth two (2) times a day. 14 Tab 0    metroNIDAZOLE (FLAGYL) 500 mg tablet Take 1 Tab by mouth two (2) times a day. 14 Tab 0    rosuvastatin (CRESTOR) 5 mg tablet Take 1 Tab by mouth nightly. Indications: hyperlipidemia 90 Tab 3    acetaminophen (TYLENOL ARTHRITIS PAIN) 650 mg CR tablet Take 650 mg by mouth every six (6) hours as needed for Pain. No current facility-administered medications on file prior to visit. 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     Postmenopausal vaginal bleeding 9/19/2013    Due to Granulation tissue from her pessary, treated w/ silver nitrate stick on 9.19.2013    Presence of pessary     #2    PARUL (stress urinary incontinence, female)      Family History   Problem Relation Age of Onset    Stroke Mother     Heart Disease Father      Social History   Substance Use Topics    Smoking status: Former Smoker    Smokeless tobacco: Never Used    Alcohol use 0.0 oz/week     0 Standard drinks or equivalent per week      Comment: minimal     /84  Pulse 73  Ht 5' 2\" (1.575 m)  Wt 149 lb (67.6 kg)  SpO2 100%  BMI 27.25 kg/m2    Physical Exam   Constitutional: She is oriented to person, place, and time. She appears well-developed and well-nourished. No distress. HENT:   Head: Normocephalic. Mouth/Throat: Oropharynx is clear and moist. No oropharyngeal exudate. Eyes: Conjunctivae and EOM are normal. Pupils are equal, round, and reactive to light. No scleral icterus. Neck: Normal range of motion. Neck supple. Thyromegaly present. Musculoskeletal: Normal range of motion. She exhibits no edema or deformity. Her feet are tender to any touch or to weightbearing   Lymphadenopathy:     She has no cervical adenopathy. Neurological: She is alert and oriented to person, place, and time. She has normal reflexes. No cranial nerve deficit. She exhibits normal muscle tone. Coordination normal.   She has 1+ deep tendon reflexes in her upper extremities trace at her knees and absent at her ankles. She has marked decreased vibratory sensation in her feet. Fine touch is slightly decreased. Toes are downgoing bilaterally. Skin: Skin is warm and dry. No rash noted. She is not diaphoretic. No erythema. Psychiatric: She has a normal mood and affect. Her behavior is normal. Judgment and thought content normal.       ASSESSMENT and PLAN  IDEOPATHIC PERIPHERAL NEUROPATHY  This neuropathy is probably just an idiopathic peripheral neuropathy but could be related to treatment for ovarian cancer or the cancer itself. I am not anxious to put her on any of the usual medications will use for pain control at this time. She is on and off a lot of medication already. I am going to try her on some Zostrix-HP cream which she will apply to her feet initially once a night before she goes to bed however eventually I would like to see her applying it 3 times a day. This is a neurotransmitter depleter. I have recommended she follow-up with nurse practitioner would at the [facility] neurology clinic.   HYPERTENSION  Stroke risk, stable, managed by primary care  HYPERLIPIDEMIA  Stroke risk, stable, managed by primary care  OVARIAN CANCER  Managed by oncology and primary care.

## 2018-08-31 ENCOUNTER — OFFICE VISIT (OUTPATIENT)
Dept: FAMILY MEDICINE CLINIC | Age: 78
End: 2018-08-31

## 2018-08-31 VITALS
OXYGEN SATURATION: 99 % | RESPIRATION RATE: 17 BRPM | HEART RATE: 64 BPM | WEIGHT: 149.4 LBS | DIASTOLIC BLOOD PRESSURE: 65 MMHG | TEMPERATURE: 98.3 F | SYSTOLIC BLOOD PRESSURE: 122 MMHG | BODY MASS INDEX: 27.49 KG/M2 | HEIGHT: 62 IN

## 2018-08-31 DIAGNOSIS — R19.7 DIARRHEA, UNSPECIFIED TYPE: ICD-10-CM

## 2018-08-31 DIAGNOSIS — K57.92 DIVERTICULITIS: Primary | ICD-10-CM

## 2018-08-31 NOTE — PROGRESS NOTES
Chief Complaint   Patient presents with    Diverticulitis     ed follow up         HPI:      Magno Willis is a 69 y.o. female. HTN  BP's ok lately. Remains on norvasc, hyzaar, aldactone, and metoprolol. Taking regularly     Hyperlipidemia. On Atorvastatin, questran and benefiber. Aren well. No myalgias, arthralgias, unusual weakness.     LBP  Has been doing better since last visit on aleve.  Takes Prilosec daily for gastric ulcer prevention.       Hx ovarian cancer. Doing well on questran at [facility]. Last Ca125 was ok last check     Insomnia  Doing well on ambien 5mg QHS.    Neuropathy  Was on 75 mg of Lyrica once per day but stopped taking it because she did not feel as though it was working. .  Symptoms are not controlled.  Having severe pain of the left foot Elizaelda Salazara the feeling of \"pins and needles\".  Tried back on Gabapentin, had diarrhea and stopped it. New Issues:  She was seen in our office 7/17/17 for LLQ and started treatment for sigmoid diverticulitis. Given Cipro and Flagyl. Developed diarrhea and was seen in the [facility] ED 8/29. WBC normal on labs. Thought to be diarrhea related to antibiotics. Was given fluids and sent home. She has continued using Imodium at home. Diarrhea has slowed down. None today. Has picked up Activia. Has trouble remembering to take it. No fevers or chills at home. Her left lower abdomen is still somewhat sore. She has anti-emetics and extra cipro and flagyl at home in case she needs it. Allergies   Allergen Reactions    Feldene [Piroxicam] Other (comments)     Stomach problem    Toradol [Ketorolac Tromethamine] Itching    Voltaren [Diclofenac Sodium] Diarrhea       Current Outpatient Prescriptions   Medication Sig    promethazine (PHENERGAN) 25 mg tablet Take 1 Tab by mouth every six (6) hours as needed.  atorvastatin (LIPITOR) 20 mg tablet Take  by mouth daily.  aspirin delayed-release 81 mg tablet Take  by mouth daily.     capsaicin (ZOSTRIX-HP) 0.1 % topical cream Apply  to affected area nightly.  zolpidem (AMBIEN) 5 mg tablet Take 1 Tab by mouth nightly as needed for Sleep. Max Daily Amount: 5 mg. Indications: Sleep    omeprazole (PRILOSEC) 20 mg capsule TAKE 1 CAPSULE BY MOUTH TWO TIMES DAILY INDICATIONS:  PREVENTION OF NSAID-INDUCED GASTRIC ULCER    rosuvastatin (CRESTOR) 5 mg tablet Take 1 Tab by mouth nightly. Indications: hyperlipidemia    spironolactone (ALDACTONE) 25 mg tablet TAKE 1/2 TABLET BY MOUTH DAILY    cholestyramine-sucrose (QUESTRAN) 4 gram powder Take 4 g by mouth two (2) times daily (with meals).  amLODIPine (NORVASC) 10 mg tablet TAKE 1 TABLET BY MOUTH  DAILY. INDICATIONS:  PRESSURE    metoprolol tartrate (LOPRESSOR) 50 mg tablet TAKE 1 TABLET BY MOUTH TWO  TIMES A DAY. INDICATIONS:  PRESSURE AND HEART    losartan-hydroCHLOROthiazide (HYZAAR) 100-12.5 mg per tablet TAKE 1 TABLET BY MOUTH  DAILY. INDICATIONS:  PRESSURE    acetaminophen (TYLENOL ARTHRITIS PAIN) 650 mg CR tablet Take 650 mg by mouth every six (6) hours as needed for Pain. No current facility-administered medications for this visit. 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     Postmenopausal vaginal bleeding 9/19/2013    Due to Granulation tissue from her pessary, treated w/ silver nitrate stick on 9.19.2013    Presence of pessary     #2    PARUL (stress urinary incontinence, female)        Past Surgical History:   Procedure Laterality Date    HX APPENDECTOMY      HX COLECTOMY      ([place])    HX DILATION AND CURETTAGE  1963    colonization    HX SALPINGO-OOPHORECTOMY  7/03    Exploratory Lap, omentectomy, tumor debulking Kaykay Lopez)    HX TONSILLECTOMY  1964       Social History     Social History    Marital status: SINGLE     Spouse name: N/A    Number of children: 0    Years of education: N/A     Occupational History    front office, 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, [employer]     Social History Main Topics    Smoking status: Former Smoker    Smokeless tobacco: Never Used    Alcohol use 0.0 oz/week     0 Standard drinks or equivalent per week      Comment: minimal    Drug use: No    Sexual activity: No     Other Topics Concern     Service No    Blood Transfusions No    Caffeine Concern No    Occupational Exposure No    Hobby Hazards No    Sleep Concern No    Stress Concern No    Weight Concern No    Special Diet No    Back Care No    Exercise No    Bike Helmet No    Seat Belt Yes    Self-Exams No     Social History Narrative       Family History   Problem Relation Age of Onset    Stroke Mother     Heart Disease Father        Above history reviewed. ROS:  Denies fever, chills, cough, chest pain, SOB,  nausea, vomiting, or diarrhea. Denies wt loss, wt gain, hemoptysis, hematochezia or melena.     Physical Examination:    /65 (BP 1 Location: Left arm, BP Patient Position: Sitting)  Pulse 64  Temp 98.3 °F (36.8 °C) (Oral)   Resp 17  Ht 5' 2\" (1.575 m)  Wt 149 lb 6.4 oz (67.8 kg)  SpO2 99%  BMI 27.33 kg/m2    General: Alert and Ox3, Fluent speech  Neck:  Supple, no adenopathy, JVD, mass or bruit  Chest:  Clear to Ausculation, without wheezes, rales, rubs or ronchi  Cardiac: RRR  Abdomen:  +BS, soft, nontender without palpable HSM  Extremities:  No cyanosis, clubbing or edema  Neurologic:  Ambulatory without assist, CN 2-12 grossly intact. Moves all extremities. Skin: no rash  Lymphadenopathy: no cervical or supraclavicular nodes    ASSESSMENT AND PLAN:     1. Diverticulitis  Improved  No fevers  Normal WBC count  Patient to start ABX over the weekend if pain increases or fevers start    2.  Diarrhea, unspecified type  Resolving  Push fluids  Continue Probiotics     RTC PRN    Mark Coto, NP

## 2018-08-31 NOTE — MR AVS SNAPSHOT
Wendy Owen 
 
 
 [address] [address] [phone] Patient: Roro Carl MRN: [id number] Flor Sykes Visit Information Date & Time Provider Department Dept. Phone Encounter #  
 8/31/2018 10:10 [place] [address] [address] [id number] Follow-up Instructions Return if symptoms worsen or fail to improve. Follow-up and Disposition History Upcoming Health Maintenance Date Due  
 GLAUCOMA SCREENING Q2Y 6/12/2015 Influenza Age 5 to Adult 8/1/2018 MEDICARE YEARLY EXAM 5/16/2019 DTaP/Tdap/Td series (2 - Td) 8/25/2027 Allergies as of 8/31/2018  Review Complete On: 8/31/2018 By: Keegan Morales NP Severity Noted Reaction Type Reactions Feldene [Piroxicam]  02/11/2016    Other (comments) Stomach problem Toradol [Ketorolac Tromethamine]  06/10/2013    Itching Voltaren [Diclofenac Sodium]  06/10/2013    Diarrhea Current Immunizations  Reviewed on 9/1/2017 Name Date Pneumococcal Conjugate (PCV-13) 11/13/2015 Pneumococcal Polysaccharide (PPSV-23) 10/29/2004 Zoster Vaccine, Live 1/31/2012 Not reviewed this visit You Were Diagnosed With   
  
 Codes Comments Diverticulitis    -  Primary ICD-10-CM: K57.92 
ICD-9-CM: 562.11 Diarrhea, unspecified type     ICD-10-CM: R19.7 ICD-9-CM: 787.91 Vitals BP Pulse Temp Resp Height(growth percentile) Weight(growth percentile) 122/65 (BP 1 Location: Left arm, BP Patient Position: Sitting) 64 98.3 °F (36.8 °C) (Oral) 17 5' 2\" (1.575 m) 149 lb 6.4 oz (67.8 kg) SpO2 BMI OB Status Smoking Status 99% 27.33 kg/m2 Postmenopausal Former Smoker Vitals History BMI and BSA Data Body Mass Index Body Surface Area  
 27.33 kg/m 2 1.72 m 2 Preferred Pharmacy Pharmacy Name Phone  [address] [facility]  [address] [facility] [phone] Your Updated Medication List  
  
   
This list is accurate as of 8/31/18 10:54 AM.  Always use your most recent med list. amLODIPine 10 mg tablet Commonly known as:  Mehreen Croon TAKE 1 TABLET BY MOUTH  DAILY. INDICATIONS:  PRESSURE  
  
 aspirin delayed-release 81 mg tablet Take  by mouth daily. atorvastatin 20 mg tablet Commonly known as:  LIPITOR Take  by mouth daily. capsaicin 0.1 % topical cream  
Commonly known as:  ZOSTRIX-HP Apply  to affected area nightly. cholestyramine-sucrose 4 gram powder Commonly known as:  [place] Take 4 g by mouth two (2) times daily (with meals). losartan-hydroCHLOROthiazide 100-12.5 mg per tablet Commonly known as:  HYZAAR  
TAKE 1 TABLET BY MOUTH  DAILY. INDICATIONS:  PRESSURE  
  
 metoprolol tartrate 50 mg tablet Commonly known as:  LOPRESSOR  
TAKE 1 TABLET BY MOUTH TWO  TIMES A DAY. INDICATIONS:  PRESSURE AND HEART  
  
 omeprazole 20 mg capsule Commonly known as:  PRILOSEC  
TAKE 1 CAPSULE BY MOUTH TWO TIMES DAILY INDICATIONS:  PREVENTION OF NSAID-INDUCED GASTRIC ULCER  
  
 promethazine 25 mg tablet Commonly known as:  PHENERGAN Take 1 Tab by mouth every six (6) hours as needed. rosuvastatin 5 mg tablet Commonly known as:  CRESTOR Take 1 Tab by mouth nightly. Indications: hyperlipidemia  
  
 spironolactone 25 mg tablet Commonly known as:  ALDACTONE  
TAKE 1/2 TABLET BY MOUTH DAILY  
  
 TYLENOL ARTHRITIS PAIN 650 mg Tber Generic drug:  acetaminophen Take 650 mg by mouth every six (6) hours as needed for Pain.  
  
 zolpidem 5 mg tablet Commonly known as:  AMBIEN Take 1 Tab by mouth nightly as needed for Sleep. Max Daily Amount: 5 mg. Indications: Sleep Follow-up Instructions Return if symptoms worsen or fail to improve. Introducing [facility]!    
 [employer] introduces [employer] patient portal. Now you can access parts of your medical record, email your doctor's office, and request medication refills online. 1. In your internet browser, go to [email or web address] 2. Click on the First Time User? Click Here link in the Sign In box. You will see the New Member Sign Up page. 3. Enter your [employer] Access Code exactly as it appears below. You will not need to use this code after youve completed the sign-up process. If you do not sign up before the expiration date, you must request a new code. · [employer] Access Code: [email or web address] Expires: 11/27/2018  2:20 AM 
 
4. Enter the last four digits of your Social Security Number (xxxx) and Date of Birth (mm/dd/yyyy) as indicated and click Submit. You will be taken to the next sign-up page. 5. Create a [employer] ID. This will be your [employer] login ID and cannot be changed, so think of one that is secure and easy to remember. 6. Create a [employer] password. You can change your password at any time. 7. Enter your Password Reset Question and Answer. This can be used at a later time if you forget your password. 8. Enter your e-mail address. You will receive e-mail notification when new information is available in [address] 9. Click Sign Up. You can now view and download portions of your medical record. 10. Click the Download Summary menu link to download a portable copy of your medical information. If you have questions, please visit the Frequently Asked Questions section of the [employer] website. Remember, [employer] is NOT to be used for urgent needs. For medical emergencies, dial 911. Now available from your iPhone and Android! Please provide this summary of care documentation to your next provider. Your primary care clinician is listed as rGacie Tellez. If you have any questions after today's visit, please call [phone].

## 2018-09-04 ENCOUNTER — TELEPHONE (OUTPATIENT)
Dept: FAMILY MEDICINE CLINIC | Age: 78
End: 2018-09-04

## 2018-09-04 NOTE — TELEPHONE ENCOUNTER
Patient contacted and informed to continue taking ABX and to let it run its course. If still not feeling any better when medication is complete to call back to the office to be scheduled for a follow up appointment.

## 2018-09-04 NOTE — TELEPHONE ENCOUNTER
Inna Bustillos called. She was in the ER. They told her to call Rickey Diallo to give a update on how she is doing. Please call.

## 2018-12-14 DIAGNOSIS — K21.9 GASTROESOPHAGEAL REFLUX DISEASE WITHOUT ESOPHAGITIS: ICD-10-CM

## 2018-12-14 DIAGNOSIS — C56.9 MALIGNANT NEOPLASM OF OVARY, UNSPECIFIED LATERALITY (HCC): ICD-10-CM

## 2018-12-14 DIAGNOSIS — E78.5 HYPERLIPIDEMIA, UNSPECIFIED HYPERLIPIDEMIA TYPE: ICD-10-CM

## 2018-12-16 RX ORDER — CHOLESTYRAMINE 4 G/9G
POWDER, FOR SUSPENSION ORAL
Qty: 1890 G | Refills: 11 | Status: SHIPPED | OUTPATIENT
Start: 2018-12-16 | End: 2020-05-26 | Stop reason: SDUPTHER

## 2020-08-06 ENCOUNTER — PATIENT OUTREACH (OUTPATIENT)
Dept: CASE MANAGEMENT | Age: 80
End: 2020-08-06

## 2020-08-06 NOTE — PROGRESS NOTES
Patient contacted regarding recent discharge and COVID-19 risk. Discussed COVID-19 related testing which was available at this time. Test results were negative. Patient informed of results, if available? yes    Care Transition Nurse/ Ambulatory Care Manager contacted the patient by telephone to perform post discharge assessment. Verified name and  with patient as identifiers. Patient has following risk factors of: immunocompromised. CTN/ACM reviewed discharge instructions, medical action plan and red flags related to discharge diagnosis. Reviewed and educated them on any new and changed medications related to discharge diagnosis. Advised obtaining a 90-day supply of all daily and as-needed medications. Advance Care Planning:   Does patient have an Advance Directive: yes, reviewed and current     Education provided regarding infection prevention, and signs and symptoms of COVID-19 and when to seek medical attention with patient who verbalized understanding. Discussed exposure protocols and quarantine from [address] you at higher risk for severe illness  and given an opportunity for questions and concerns. The patient agrees to contact the COVID-19 hotline [phone] or PCP office for questions related to their healthcare. CTN/ACM provided contact information for future reference. From CDC: Are you at higher risk for severe illness?  Wash your hands often.  Avoid close contact (6 feet, which is about two arm lengths) with people who are sick.  Put distance between yourself and other people if COVID-19 is spreading in your community.  Clean and disinfect frequently touched surfaces.  Avoid all cruise travel and non-essential air travel.  Call your healthcare professional if you have concerns about COVID-19 and your underlying condition or if you are sick.     For more information on steps you can take to protect yourself, see CDC's How to Protect Yourself Patient/family/caregiver given information for [employer] and agrees to enroll no      Plan for follow-up call in 7-14 days based on severity of symptoms and risk factors. Patient reports feeling much better . She did not fill Keflex or Prednisone as she wanted to speak with her PCP first. She notes having frequent positive UA's in the past due to presence of pessary but has not always treated with ABX unless she had urinary related sx's. She isnt sure she needs an antibiotic. She feels maybe she had like a viral GI bug. She had fever, diarrhea, vomiting but none now. Symptoms have resolved. COVID was negative. ACM assisted with scheduling follow up appt with Cristhian Baig NP on 8/11 at 10am. Patient did note some concerns and was provided info for the patient advocate. COVID teaching provided. Patient with no further questions. ACM will route this note to PCP for coordination of care.

## 2020-08-07 NOTE — PROGRESS NOTES
Called pt and scheduled a vv/TELEPHONE CALL instead of IN HOUSE visit same day & time  Tues., Aug. 11th, 2020 @ 10am w/NP SEBASTIAN.  ~CARF~

## 2020-08-20 ENCOUNTER — PATIENT OUTREACH (OUTPATIENT)
Dept: PRIMARY CARE CLINIC | Age: 80
End: 2020-08-20

## 2020-08-20 NOTE — PROGRESS NOTES
Patient resolved from Transition of Care episode on 8/20/20  Discussed COVID-19 related testing which was available at this time. Test results were negative. Patient informed of results, if available? no     Patient/family has been provided the following resources and education related to COVID-19:                         Signs, symptoms and red flags related to COVID-19            CDC exposure and quarantine guidelines            [employer] exposure contact - [phone]            Contact for their local Department of Health                 Patient currently reports that the following symptoms have improved:  Patient reports she did follow up with PCP but did not start on Xyzal as recommended as she doesnt feel she has allergies. She has been taking Mucinex and it was helping for 5 days or so with the mucus but now she seems to be having more mucus and cough and last night had problems with burping/ and reflux that kept her up until taking antacids and a nausea pill. She is not having fevers. Recommended if sx's have worsened she should consider another follow up with PCP and she notes she is feeling a little better today . No further questions . No further outreach scheduled with this CTN/ACM/LPN/HC/ MA. Episode of Care resolved. Patient has this CTN/ACM/LPN/HC/MA contact information if future needs arise.

## 2020-10-15 ENCOUNTER — TELEPHONE (OUTPATIENT)
Dept: PRIMARY CARE CLINIC | Age: 80
End: 2020-10-15

## 2021-03-30 DIAGNOSIS — I10 ESSENTIAL HYPERTENSION: ICD-10-CM

## 2021-03-30 RX ORDER — SPIRONOLACTONE 25 MG/1
TABLET ORAL
Qty: 45 TAB | Refills: 3 | Status: SHIPPED | OUTPATIENT
Start: 2021-03-30 | End: 2022-03-22

## 2021-05-11 ENCOUNTER — OFFICE VISIT (OUTPATIENT)
Dept: FAMILY MEDICINE CLINIC | Age: 81
End: 2021-05-11
Payer: MEDICARE

## 2021-05-11 VITALS
BODY MASS INDEX: 26.09 KG/M2 | HEIGHT: 62 IN | DIASTOLIC BLOOD PRESSURE: 60 MMHG | SYSTOLIC BLOOD PRESSURE: 120 MMHG | HEART RATE: 72 BPM | TEMPERATURE: 96.8 F | WEIGHT: 141.8 LBS

## 2021-05-11 DIAGNOSIS — S63.601A SPRAIN OF RIGHT THUMB, UNSPECIFIED SITE OF DIGIT, INITIAL ENCOUNTER: Primary | ICD-10-CM

## 2021-05-11 DIAGNOSIS — L57.0 ACTINIC KERATOSES: ICD-10-CM

## 2021-05-11 PROCEDURE — 17003 DESTRUCT PREMALG LES 2-14: CPT | Performed by: FAMILY MEDICINE

## 2021-05-11 PROCEDURE — G8536 NO DOC ELDER MAL SCRN: HCPCS | Performed by: FAMILY MEDICINE

## 2021-05-11 PROCEDURE — G8510 SCR DEP NEG, NO PLAN REQD: HCPCS | Performed by: FAMILY MEDICINE

## 2021-05-11 PROCEDURE — 1101F PT FALLS ASSESS-DOCD LE1/YR: CPT | Performed by: FAMILY MEDICINE

## 2021-05-11 PROCEDURE — G8427 DOCREV CUR MEDS BY ELIG CLIN: HCPCS | Performed by: FAMILY MEDICINE

## 2021-05-11 PROCEDURE — G8752 SYS BP LESS 140: HCPCS | Performed by: FAMILY MEDICINE

## 2021-05-11 PROCEDURE — 99213 OFFICE O/P EST LOW 20 MIN: CPT | Performed by: FAMILY MEDICINE

## 2021-05-11 PROCEDURE — G8754 DIAS BP LESS 90: HCPCS | Performed by: FAMILY MEDICINE

## 2021-05-11 PROCEDURE — G8399 PT W/DXA RESULTS DOCUMENT: HCPCS | Performed by: FAMILY MEDICINE

## 2021-05-11 PROCEDURE — 17000 DESTRUCT PREMALG LESION: CPT | Performed by: FAMILY MEDICINE

## 2021-05-11 PROCEDURE — 1090F PRES/ABSN URINE INCON ASSESS: CPT | Performed by: FAMILY MEDICINE

## 2021-05-11 PROCEDURE — G8419 CALC BMI OUT NRM PARAM NOF/U: HCPCS | Performed by: FAMILY MEDICINE

## 2021-05-11 RX ORDER — TRIAMCINOLONE ACETONIDE 1 MG/G
CREAM TOPICAL 2 TIMES DAILY
Qty: 80 G | Refills: 3 | Status: ON HOLD | OUTPATIENT
Start: 2021-05-11 | End: 2022-04-16

## 2021-05-11 NOTE — PROGRESS NOTES
Chief Complaint   Patient presents with    Thumb Pain     1. Have you been to the ER, urgent care clinic since your last visit? Hospitalized since your last visit? No  2. Have you seen or consulted any other health care providers outside of the [address] [place] [address] since your last visit? Include any pap smears or colon screening. No    Neeraj Nadir is a 79 y.o. female    HPI:  Symptoms include R thumb pain, mostly to the IP and the MCP. No trauma or overuse known. Has been bothering pt for past 6wk or so. Has been using hot, cold, motrin, aleve, tramadol, celebrex at varying intervals without much help. valuation to date: none. PMH, SH, Medications/Allergies: reviewed, on chart. Current Outpatient Medications   Medication Sig    spironolactone (ALDACTONE) 25 mg tablet TAKE 1/2 TABLET BY MOUTH DAILY.  metoprolol succinate (TOPROL-XL) 50 mg XL tablet Take 1 Tab by mouth daily.  rosuvastatin (CRESTOR) 5 mg tablet Take 1 Tab by mouth nightly. Indications: excessive fat in the blood    amLODIPine (NORVASC) 10 mg tablet TAKE 1 TABLET BY MOUTH  DAILY    acetaminophen (TylenoL) 325 mg tablet Take 650 mg by mouth every four (4) hours as needed for Pain.  zolpidem (AMBIEN) 5 mg tablet TAKE 1 TABLET BY MOUTH NIGHTLY AS NEEDED FOR SLEEP. MAX DAILY AMOUNT= 1 TABLET    losartan-hydroCHLOROthiazide (HYZAAR) 100-12.5 mg per tablet Take 1 Tab by mouth daily.  cholestyramine-sucrose 4 gram powder MIX 1 SCOOP (4 GRAMS) WITH  LIQUID AND DRINK TWO TIMES  DAILY WITH MEALS    omeprazole (PRILOSEC) 20 mg capsule TAKE 1 CAPSULE BY MOUTH TWO TIMES DAILY INDICATIONS:  PREVENTION OF NSAID-INDUCED GASTRIC ULCER    ALPRAZolam (XANAX) 0.5 mg tablet Take 1 Tab by mouth two (2) times daily as needed for Anxiety. Max Daily Amount: 1 mg. No current facility-administered medications for this visit.        ROS:  Constitutional: No fever, chills or abnormal weight loss  Respiratory: No cough, SOB   CV: No chest pain or Palpitations    Visit Vitals  /60 (BP 1 Location: Right upper arm, BP Patient Position: Sitting, BP Cuff Size: Adult)   Pulse 72   Temp 96.8 °F (36 °C) (Temporal)   Ht 5' 2\" (1.575 m)   Wt 141 lb 12.8 oz (64.3 kg)   BMI 25.94 kg/m²     Wt Readings from Last 3 Encounters:   05/11/21 141 lb 12.8 oz (64.3 kg)   08/04/20 140 lb (63.5 kg)   02/17/20 143 lb 12.8 oz (65.2 kg)     BP Readings from Last 3 Encounters:   05/11/21 120/60   08/05/20 178/75   02/17/20 125/60     Physical Examination: General appearance - alert, well appearing, and in no distress  Mental status - alert, oriented to person, place, and time  Eyes - pupils equal and reactive, extraocular eye movements intact  ENT - bilateral external ears and nose normal. Normal lips  Neck - supple, no significant adenopathy, no thyromegaly or mass  Extremities - peripheral pulses normal, no pedal edema, no clubbing or cyanosis. R thumb   Skin- mult hyperkeratotic erthematous papules to the upper cheeks bilat    A/P:  R side Thumb sprain  Vs acute inflammatory arthritis of the thumb. Tx with thumb spica splint during activity and sleep, and use prednisone taper (has) 40mg /d x3d,  20mg /d x3d, 10mg /d x3d. If not improving rapidly, plan xrays. 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 Pt would like to tx with LN2 today. Plans recheck in Aug for routine recheck and labs.     Chart reviewed for the following:   Mindy Harris MD, have reviewed the History, Physical and updated the Allergic reactions for [address] performed immediately prior to start of procedure:   Mindy Harris MD, have performed the following reviews on Monica Shirts prior to the start of the procedure:            * Patient was identified by name and date of birth   * Agreement on procedure being performed was verified  * Risks and Benefits explained to the patient  * Procedure site verified and marked as necessary  * Patient was positioned for comfort  * Consent was verified  * Pain level 0 pre-procedure. 10:23 AM    Procedure: cryotherapy. Consent: given. Details: the lesions were frozen with LN2 for 10 seconds frost time with 1mm frost halo x 2 freeze-thaw cycles. Total of 4 lesions frozen. PT santos well. Skin care ed given. Pain level in goal control.

## 2021-06-29 DIAGNOSIS — H91.90 HEARING LOSS, UNSPECIFIED HEARING LOSS TYPE, UNSPECIFIED LATERALITY: Primary | ICD-10-CM

## 2021-08-26 DIAGNOSIS — I10 ESSENTIAL HYPERTENSION: ICD-10-CM

## 2021-08-26 DIAGNOSIS — K21.9 GASTROESOPHAGEAL REFLUX DISEASE WITHOUT ESOPHAGITIS: ICD-10-CM

## 2021-08-26 DIAGNOSIS — C56.9 MALIGNANT NEOPLASM OF OVARY, UNSPECIFIED LATERALITY (HCC): ICD-10-CM

## 2021-08-26 DIAGNOSIS — E78.5 HYPERLIPIDEMIA, UNSPECIFIED HYPERLIPIDEMIA TYPE: ICD-10-CM

## 2021-08-26 DIAGNOSIS — F51.01 PRIMARY INSOMNIA: ICD-10-CM

## 2021-08-27 RX ORDER — CHOLESTYRAMINE 4 G/9G
POWDER, FOR SUSPENSION ORAL
Qty: 756 G | Refills: 0 | Status: SHIPPED | OUTPATIENT
Start: 2021-08-27 | End: 2021-10-07

## 2021-08-27 RX ORDER — ZOLPIDEM TARTRATE 5 MG/1
TABLET ORAL
Qty: 90 TABLET | Refills: 0 | Status: SHIPPED | OUTPATIENT
Start: 2021-08-27 | End: 2021-11-24 | Stop reason: SDUPTHER

## 2021-08-27 RX ORDER — LOSARTAN POTASSIUM AND HYDROCHLOROTHIAZIDE 12.5; 1 MG/1; MG/1
TABLET ORAL
Qty: 90 TABLET | Refills: 0 | Status: SHIPPED | OUTPATIENT
Start: 2021-08-27 | End: 2021-11-01

## 2021-08-30 ENCOUNTER — OFFICE VISIT (OUTPATIENT)
Dept: FAMILY MEDICINE CLINIC | Age: 81
End: 2021-08-30
Payer: MEDICARE

## 2021-08-30 VITALS
TEMPERATURE: 97.1 F | HEART RATE: 86 BPM | OXYGEN SATURATION: 99 % | HEIGHT: 62 IN | DIASTOLIC BLOOD PRESSURE: 62 MMHG | WEIGHT: 139.4 LBS | BODY MASS INDEX: 25.65 KG/M2 | RESPIRATION RATE: 18 BRPM | SYSTOLIC BLOOD PRESSURE: 140 MMHG

## 2021-08-30 DIAGNOSIS — Z00.00 MEDICARE ANNUAL WELLNESS VISIT, SUBSEQUENT: Primary | ICD-10-CM

## 2021-08-30 DIAGNOSIS — K21.9 GASTROESOPHAGEAL REFLUX DISEASE WITHOUT ESOPHAGITIS: ICD-10-CM

## 2021-08-30 DIAGNOSIS — R73.01 IMPAIRED FASTING GLUCOSE: ICD-10-CM

## 2021-08-30 DIAGNOSIS — L57.0 ACTINIC KERATOSES: ICD-10-CM

## 2021-08-30 DIAGNOSIS — F51.01 PRIMARY INSOMNIA: ICD-10-CM

## 2021-08-30 DIAGNOSIS — I10 ESSENTIAL HYPERTENSION: ICD-10-CM

## 2021-08-30 DIAGNOSIS — H61.23 IMPACTED CERUMEN, BILATERAL: ICD-10-CM

## 2021-08-30 DIAGNOSIS — C56.9 MALIGNANT NEOPLASM OF OVARY, UNSPECIFIED LATERALITY (HCC): ICD-10-CM

## 2021-08-30 DIAGNOSIS — E78.5 HYPERLIPIDEMIA, UNSPECIFIED HYPERLIPIDEMIA TYPE: ICD-10-CM

## 2021-08-30 PROCEDURE — G0439 PPPS, SUBSEQ VISIT: HCPCS | Performed by: NURSE PRACTITIONER

## 2021-08-30 PROCEDURE — 17003 DESTRUCT PREMALG LES 2-14: CPT | Performed by: NURSE PRACTITIONER

## 2021-08-30 PROCEDURE — 69209 REMOVE IMPACTED EAR WAX UNI: CPT | Performed by: NURSE PRACTITIONER

## 2021-08-30 PROCEDURE — 17000 DESTRUCT PREMALG LESION: CPT | Performed by: NURSE PRACTITIONER

## 2021-08-30 PROCEDURE — 99214 OFFICE O/P EST MOD 30 MIN: CPT | Performed by: NURSE PRACTITIONER

## 2021-08-30 RX ORDER — ZOLPIDEM TARTRATE 5 MG/1
TABLET ORAL
Qty: 90 TABLET | Refills: 0 | Status: CANCELLED | OUTPATIENT
Start: 2021-08-30

## 2021-08-30 RX ORDER — CHOLESTYRAMINE 4 G/9G
POWDER, FOR SUSPENSION ORAL
Qty: 756 G | Refills: 0 | Status: CANCELLED | OUTPATIENT
Start: 2021-08-30

## 2021-08-30 RX ORDER — LOSARTAN POTASSIUM AND HYDROCHLOROTHIAZIDE 12.5; 1 MG/1; MG/1
1 TABLET ORAL DAILY
Qty: 90 TABLET | Refills: 0 | Status: CANCELLED | OUTPATIENT
Start: 2021-08-30

## 2021-08-30 NOTE — PROGRESS NOTES
Chief Complaint   Patient presents with    Skin Problem     few lesions she would like removed    Annual Wellness Visit     3 most recent PHQ Screens 8/30/2021   PHQ Not Done -   Little interest or pleasure in doing things Not at all   Feeling down, depressed, irritable, or hopeless Not at all   Total Score PHQ 2 0     Learning Assessment 8/30/2021   PRIMARY LEARNER Patient   PRIMARY LANGUAGE ENGLISH   LEARNER PREFERENCE PRIMARY READING     LISTENING   ANSWERED BY patient   RELATIONSHIP SELF     Fall Risk Assessment, last 12 mths 8/30/2021   Able to walk? Yes   Fall in past 12 months? 0   Do you feel unsteady? 0   Are you worried about falling 0   Is TUG test greater than 12 seconds? -   Is the gait abnormal? -   Number of falls in past 12 months -   Fall with injury? -     Abuse Screening Questionnaire 8/30/2021   Do you ever feel afraid of your partner? N   Are you in a relationship with someone who physically or mentally threatens you? N   Is it safe for you to go home? Y     ADL Assessment 8/30/2021   Feeding yourself No Help Needed   Getting from bed to chair No Help Needed   Getting dressed No Help Needed   Bathing or showering No Help Needed   Walk across the room (includes cane/walker) No Help Needed   Using the telphone No Help Needed   Taking your medications No Help Needed   Preparing meals No Help Needed   Managing money (expenses/bills) No Help Needed   Moderately strenuous housework (laundry) No Help Needed   Shopping for personal items (toiletries/medicines) No Help Needed   Shopping for groceries No Help Needed   Driving No Help Needed   Climbing a flight of stairs No Help Needed   Getting to places beyond walking distances No Help Needed     1. Have you been to the ER, urgent care clinic since your last visit? Hospitalized since your last visit? No    2. Have you seen or consulted any other health care providers outside of the [address] [place] [address] [place] since your last visit?   Include any pap smears or colon screening. No      Chief Complaint   Patient presents with    Skin Problem     few lesions she would like removed    Annual Wellness Visit         Visit Vitals  BP (!) 140/62 (BP 1 Location: Left arm, BP Patient Position: Sitting, BP Cuff Size: Adult long)   Pulse 86   Temp 97.1 °F (36.2 °C) (Temporal)   Resp 18   Ht 5' 2\" (1.575 m)   Wt 139 lb 6.4 oz (63.2 kg)   SpO2 99%   BMI 25.50 kg/m²       Pain Scale: 0 - No pain/10  Pain Location:     Don Be is a [de-identified] y.o. female presenting for/with:    Skin Problem (few lesions she would like removed) and Annual Wellness Visit      Symptom review:    NO  Fever   NO  Shaking chills  NO  Cough  NO  Body aches  NO  Coughing up blood  NO  Chest congestion  NO  Chest pain  NO  Shortness of breath  NO  Profound Loss of smell/taste  NO  Nausea/Vomiting   NO  Loose stool/Diarrhea  NO  any skin issues    Patient Risk Factors Reviewed as follows:  NO  have you been in Close contact with confirmed COVID19 patient   NO  History of recent travel to affected geographical areas within the past 14 days  NO  COPD  NO  Active Cancer/Leukemia/Lymphoma/Chemotherapy  NO  Oral steroid use  NO  Pregnant  NO  Diabetes Mellitus  NO  Heart disease  NO  Asthma  NO Health care worker at home  NO Health care worker  NO Is there a Pregnant Woman in the home  NO Dialysis pt in the home   NO a large number of people living in the home  Recent Travel Screening and Travel History documentation     Travel Screening     Question   Response    In the last month, have you been in contact with someone who was confirmed or suspected to have Coronavirus / COVID-19? No / Unsure    Have you had a COVID-19 viral test in the last 14 days? No    Do you have any of the following new or worsening symptoms? None of these    Have you traveled internationally or domestically in the last month?   No      Travel History   Travel since 07/30/21     No documented travel since 07/30/21 This is the Subsequent Medicare Annual Wellness Exam, performed 12 months or more after the Initial AWV or the last Subsequent AWV    I have reviewed the patient's medical history in detail and updated the computerized patient record. Assessment/Plan   Education and counseling provided:  Are appropriate based on today's review and evaluation    1. Medicare annual wellness visit, subsequent       Depression Risk Factor Screening     3 most recent PHQ Screens 8/30/2021   PHQ Not Done -   Little interest or pleasure in doing things Not at all   Feeling down, depressed, irritable, or hopeless Not at all   Total Score PHQ 2 0       Alcohol Risk Screen    Do you average more than 1 drink per night or more than 7 drinks a week:  No    On any one occasion in the past three months have you have had more than 3 drinks containing alcohol:  No        Functional Ability and Level of Safety    Hearing: Hearing is good. Activities of Daily Living: The home contains: no safety equipment. Patient does total self care      Ambulation: with no difficulty     Fall Risk:  Fall Risk Assessment, last 12 mths 8/30/2021   Able to walk? Yes   Fall in past 12 months? 0   Do you feel unsteady? 0   Are you worried about falling 0   Is TUG test greater than 12 seconds? -   Is the gait abnormal? -   Number of falls in past 12 months -   Fall with injury?  -      Abuse Screen:  Patient is not abused       Cognitive Screening    Has your family/caregiver stated any concerns about your memory: no         Health Maintenance Due     Health Maintenance Due   Topic Date Due    Shingrix Vaccine Age 49> (1 of 2) Never done    Lipid Screen  10/14/2020       Patient Care Team   Patient Care Team:  Sravan Kuo NP as PCP - General (Nurse Practitioner)  Sravan Kuo NP as PCP - REHABILITATION [facility] Empaneled Provider    History     Patient Active Problem List   Diagnosis Code    Essential hypertension I10    Hyperlipemia E78.5    Esophageal reflux K21.9    Malignant neoplasm of ovary (HCC) C56.9    Cystocele [id number]    Presence of pessary Z96.0    Cervical prolapse N81.4    Postmenopausal vaginal bleeding N95.0    BCE (basal cell epithelioma) C44.91    History of ovarian cancer Z85.43    Midline low back pain without sciatica M54.5    Osteopenia of multiple sites M85.89     Past Medical History:   Diagnosis Date    Arthritis     Cancer ([facility]     ovarian    Carcinomatosis ([facility] 07/03    Cat scratch fever 09/91    Cervical prolapse 2011    Grade II    Chicken pox 1962    Contact dermatitis and other eczema, due to unspecified cause     Cystocele 2011    grade I    Diverticulosis     1985    Duodenal ulcer     Eye disorder 06/95    recurrent erosion of L eye    GERD (gastroesophageal reflux disease)     [place] measles 1964    Hypercholesterolemia     Hypertension     Menarche     onset at age 16    Menopause     onset at age 47    Neuropathy     Ovarian cancer ([facility] 07/03    Poorly diff.  Postmenopausal vaginal bleeding 9/19/2013    Due to Granulation tissue from her pessary, treated w/ silver nitrate stick on 9.19.2013    Presence of pessary     #2    PARUL (stress urinary incontinence, female)       Past Surgical History:   Procedure Laterality Date    HX APPENDECTOMY      HX DILATION AND CURETTAGE  1963    colonization    HX SALPINGO-OOPHORECTOMY  7/03    Exploratory Lap, omentectomy, tumor debulking Jeraline [place])    HX TONSILLECTOMY  1964    HX TOTAL COLECTOMY      [place] [facility])     Current Outpatient Medications   Medication Sig Dispense Refill    zolpidem (AMBIEN) 5 mg tablet TAKE 1 TABLET BY MOUTH NIGHTLY AS NEEDED FOR SLEEP.  MAX DAILY AMOUNT 1 TABLET 90 Tablet 0    losartan-hydroCHLOROthiazide (HYZAAR) 100-12.5 mg per tablet Take 1 tablet by mouth once daily 90 Tablet 0    cholestyramine-sucrose 4 gram powder MIX 1 SCOOP (4 GRAMS TOTAL) WITH LIQUID AND DRINK TWICE DAILY 756 g 0    triamcinolone acetonide (KENALOG) 0.1 % topical cream Apply  to affected area two (2) times a day. use thin layer to itchy skin as needed 80 g 3    spironolactone (ALDACTONE) 25 mg tablet TAKE 1/2 TABLET BY MOUTH DAILY. 45 Tab 3    metoprolol succinate (TOPROL-XL) 50 mg XL tablet Take 1 Tab by mouth daily. 90 Tab 4    rosuvastatin (CRESTOR) 5 mg tablet Take 1 Tab by mouth nightly. Indications: excessive fat in the blood 90 Tab 3    amLODIPine (NORVASC) 10 mg tablet TAKE 1 TABLET BY MOUTH  DAILY 90 Tab 3    acetaminophen (TylenoL) 325 mg tablet Take 650 mg by mouth every four (4) hours as needed for Pain.  omeprazole (PRILOSEC) 20 mg capsule TAKE 1 CAPSULE BY MOUTH TWO TIMES DAILY INDICATIONS:  PREVENTION OF NSAID-INDUCED GASTRIC ULCER 180 Cap 3    ALPRAZolam (XANAX) 0.5 mg tablet Take 1 Tab by mouth two (2) times daily as needed for Anxiety. Max Daily Amount: 1 mg. 180 Tab 0     Allergies   Allergen Reactions    Feldene [Piroxicam] Other (comments)     Stomach problem    Toradol [Ketorolac Tromethamine] Itching    Voltaren [Diclofenac Sodium] Diarrhea       Family History   Problem Relation Age of Onset    Stroke Mother     Heart Disease Father      Social History     Tobacco Use    Smoking status: Former Smoker    Smokeless tobacco: Never Used   Substance Use Topics    Alcohol use: No     Alcohol/week: 0.0 standard drinks     Comment: minimal       ______________        Chief Complaint   Patient presents with    Skin Problem     few lesions she would like removed    Annual Wellness Visit         HPI:      Denise Ledbetter is a [de-identified] y.o. female. Thoracic BP/ lower neck pain  Last xrays showed DJD. Hasn't done therapy for that lately. could not tolerate gabapentin or Lyrica.        HTN  BP's have been elevated recently. Currently on Losartan, HCTZ, Metoprolol, Amlodipine and Spironolactone. Taking regularly      Hyperlipidemia. On Crestor.  Aern well. No myalgias, arthralgias, unusual weakness.      Hx ovarian cancer.   Doing well on questran at B[facility]. Last Ca125 was ok last check      Insomnia  Doing well on ambien 5mg QHS. New Issues:  She has several skin lesions that she would like removed. Itchy and bothersome. She recently got hearing aids. Not wearing them currently because she was told she had a build-up of ear wax. She did not take her BP medications yet this morning. She continues to c/o back pain, but refuses PT. Allergies   Allergen Reactions    Feldene [Piroxicam] Other (comments)     Stomach problem    Toradol [Ketorolac Tromethamine] Itching    Voltaren [Diclofenac Sodium] Diarrhea       Current Outpatient Medications   Medication Sig    zolpidem (AMBIEN) 5 mg tablet TAKE 1 TABLET BY MOUTH NIGHTLY AS NEEDED FOR SLEEP. MAX DAILY AMOUNT 1 TABLET    losartan-hydroCHLOROthiazide (HYZAAR) 100-12.5 mg per tablet Take 1 tablet by mouth once daily    cholestyramine-sucrose 4 gram powder MIX 1 SCOOP (4 GRAMS TOTAL) WITH LIQUID AND DRINK TWICE DAILY    triamcinolone acetonide (KENALOG) 0.1 % topical cream Apply  to affected area two (2) times a day. use thin layer to itchy skin as needed    spironolactone (ALDACTONE) 25 mg tablet TAKE 1/2 TABLET BY MOUTH DAILY.  metoprolol succinate (TOPROL-XL) 50 mg XL tablet Take 1 Tab by mouth daily.  rosuvastatin (CRESTOR) 5 mg tablet Take 1 Tab by mouth nightly. Indications: excessive fat in the blood    amLODIPine (NORVASC) 10 mg tablet TAKE 1 TABLET BY MOUTH  DAILY    acetaminophen (TylenoL) 325 mg tablet Take 650 mg by mouth every four (4) hours as needed for Pain.  omeprazole (PRILOSEC) 20 mg capsule TAKE 1 CAPSULE BY MOUTH TWO TIMES DAILY INDICATIONS:  PREVENTION OF NSAID-INDUCED GASTRIC ULCER    ALPRAZolam (XANAX) 0.5 mg tablet Take 1 Tab by mouth two (2) times daily as needed for Anxiety. Max Daily Amount: 1 mg. No current facility-administered medications for this visit.        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 07/03    Poorly diff.     Postmenopausal vaginal bleeding 9/19/2013    Due to Granulation tissue from her pessary, treated w/ silver nitrate stick on 9.19.2013    Presence of pessary     #2    PARUL (stress urinary incontinence, female)        Past Surgical History:   Procedure Laterality Date    HX APPENDECTOMY      HX DILATION AND CURETTAGE  1963    colonization    HX SALPINGO-OOPHORECTOMY  7/03    Exploratory Lap, omentectomy, tumor debulking Rayne Lute)    HX TONSILLECTOMY  1964    HX TOTAL COLECTOMY      [place])       Social History     Socioeconomic History    Marital status: SINGLE     Spouse name: Not on file    Number of children: 0    Years of education: Not on file    Highest education level: Not on file   Occupational History    Occupation: front office, coding     Employer: RETIRED     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, [employer]   Tobacco Use    Smoking status: Former Smoker    Smokeless tobacco: Never Used   Vaping Use    Vaping Use: Never used   Substance and Sexual Activity    Alcohol use: No     Alcohol/week: 0.0 standard drinks     Comment: minimal    Drug use: No    Sexual activity: Not Currently   Other Topics Concern     Service No    Blood Transfusions No    Caffeine Concern No    Occupational Exposure No    Hobby Hazards No    Sleep Concern No    Stress Concern No    Weight Concern No    Special Diet No    Back Care No    Exercise No    Bike Helmet No    Seat Belt Yes    Self-Exams No     Social Determinants of Health     Financial Resource Strain:     Difficulty of Paying Living Expenses:    Food Insecurity:     Worried About Running Out of Food in the Last Year:     [address] in the Last Year:    Transportation Needs:     Lack of Transportation (Medical):  Lack of Transportation (Non-Medical):    Physical Activity:     Days of Exercise per Week:     Minutes of Exercise per Session:    Stress:     Feeling of Stress :    Social Connections:     Frequency of Communication with Friends and Family:     Frequency of Social Gatherings with Friends and Family:     Attends Congregation Services:     Active Member of Clubs or Organizations:     Attends Club or Organization Meetings:     Marital Status:        Family History   Problem Relation Age of Onset    Stroke Mother     Heart Disease Father        Above history reviewed. ROS:  Denies fever, chills, cough, chest pain, SOB,  nausea, vomiting, or diarrhea. Denies wt loss, wt gain, hemoptysis, hematochezia or melena. Physical Examination:    BP (!) 140/62 (BP 1 Location: Left arm, BP Patient Position: Sitting, BP Cuff Size: Adult long)   Pulse 86   Temp 97.1 °F (36.2 °C) (Temporal)   Resp 18   Ht 5' 2\" (1.575 m)   Wt 139 lb 6.4 oz (63.2 kg)   SpO2 99%   BMI 25.50 kg/m²     General: Alert and Ox3, Fluent speech  HEENT:  PERRLA, EOM intact, impacted cerumen bilaterally prior to examination, TMs, turbinates, pharynx normal.  No thyromegaly. No cervical adenopathy. Neck:  Supple, no adenopathy, JVD, mass or bruit  Chest:  Clear to Ausculation, without wheezes, rales, rubs or ronchi  Cardiac: RRR  Abdomen:  +BS, soft, nontender without palpable HSM  Extremities:  No cyanosis, clubbing or edema  Neurologic:  Ambulatory without assist, CN 2-12 grossly intact. Moves all extremities. Skin: multiple erythematous lesions on upper arms, back and face.    Lymphadenopathy: no cervical or supraclavicular nodes    ASSESSMENT AND PLAN:     1. Medicare annual wellness visit, subsequent  Updating HM     2. Primary insomnia  Good control  Continue current regimen   - zolpidem (AMBIEN) 5 mg tablet; TAKE 1 TABLET BY MOUTH NIGHTLY AS NEEDED FOR SLEEP. MAX DAILY AMOUNT 1 TABLET  Dispense: 90 Tablet; Refill: 0    3. Essential hypertension  Did not take medications this morning  - losartan-hydroCHLOROthiazide (HYZAAR) 100-12.5 mg per tablet; Take 1 Tablet by mouth daily. Dispense: 90 Tablet; Refill: 0    4. Hyperlipidemia, unspecified hyperlipidemia type  - cholestyramine-sucrose 4 gram powder; MIX 1 SCOOP (4 GRAMS TOTAL) WITH LIQUID AND DRINK TWICE DAILY  Dispense: 756 g; Refill: 0    5. Gastroesophageal reflux disease without esophagitis  - cholestyramine-sucrose 4 gram powder; MIX 1 SCOOP (4 GRAMS TOTAL) WITH LIQUID AND DRINK TWICE DAILY  Dispense: 756 g; Refill: 0    6. Malignant neoplasm of ovary, unspecified laterality (HCC)  - cholestyramine-sucrose 4 gram powder; MIX 1 SCOOP (4 GRAMS TOTAL) WITH LIQUID AND DRINK TWICE DAILY  Dispense: 756 g; Refill: 0    7. Impaired fasting glucose  - HEMOGLOBIN A1C WITH EAG; Future  - HEMOGLOBIN A1C WITH EAG    8. Actinic keratoses    Procedure Note:  Cryotherapy for the treatment of Actinic Keratoses    The risks, benefits and alternatives to treatment were carefully explained to the patient who voiced understanding and  desires to proceed. With the patient's verbal permission, 5 actinic keratoses were treated with 2 applications of Liquid Nitrogen each. The patient tolerated the procedure well and there were no complications. The patient was instructed to RTC for follow up if redness, swelling or new lesions emerge. EC   - DESTRUC PREMALIGNANT, FIRST LESION  - [address] LESIONS    9. Impacted cerumen, bilateral  Ceruminosis is noted. Wax is removed by syringing and manual debridement.  Instructions for home care to prevent wax buildup are given.   - REMOVAL IMPACTED CERUMEN IRRIGATION/LVG UNILAT     RTC PRN    Praneeth Clark, NP

## 2021-08-30 NOTE — PATIENT INSTRUCTIONS

## 2021-08-31 LAB
ALBUMIN SERPL-MCNC: 3.5 G/DL (ref 3.5–5)
ALBUMIN/GLOB SERPL: 1.1 {RATIO} (ref 1.1–2.2)
ALP SERPL-CCNC: 80 U/L (ref 45–117)
ALT SERPL-CCNC: 32 U/L (ref 12–78)
ANION GAP SERPL CALC-SCNC: 8 MMOL/L (ref 5–15)
AST SERPL-CCNC: 21 U/L (ref 15–37)
BASOPHILS # BLD: 0 K/UL (ref 0–0.1)
BASOPHILS NFR BLD: 1 % (ref 0–1)
BILIRUB SERPL-MCNC: 0.4 MG/DL (ref 0.2–1)
BUN SERPL-MCNC: 13 MG/DL (ref 6–20)
BUN/CREAT SERPL: 16 (ref 12–20)
CALCIUM SERPL-MCNC: 9.3 MG/DL (ref 8.5–10.1)
CANCER AG125 SERPL-ACNC: 7 U/ML (ref 1.5–35)
CHLORIDE SERPL-SCNC: 101 MMOL/L (ref 97–108)
CHOLEST SERPL-MCNC: 156 MG/DL
CO2 SERPL-SCNC: 24 MMOL/L (ref 21–32)
CREAT SERPL-MCNC: 0.8 MG/DL (ref 0.55–1.02)
DIFFERENTIAL METHOD BLD: NORMAL
EOSINOPHIL # BLD: 0.1 K/UL (ref 0–0.4)
EOSINOPHIL NFR BLD: 2 % (ref 0–7)
ERYTHROCYTE [DISTWIDTH] IN BLOOD BY AUTOMATED COUNT: 13.7 % (ref 11.5–14.5)
EST. AVERAGE GLUCOSE BLD GHB EST-MCNC: 123 MG/DL
GLOBULIN SER CALC-MCNC: 3.2 G/DL (ref 2–4)
GLUCOSE SERPL-MCNC: 115 MG/DL (ref 65–100)
HBA1C MFR BLD: 5.9 % (ref 4–5.6)
HCT VFR BLD AUTO: 37.6 % (ref 35–47)
HDLC SERPL-MCNC: 68 MG/DL
HDLC SERPL: 2.3 {RATIO} (ref 0–5)
HGB BLD-MCNC: 12.5 G/DL (ref 11.5–16)
IMM GRANULOCYTES # BLD AUTO: 0 K/UL (ref 0–0.04)
IMM GRANULOCYTES NFR BLD AUTO: 0 % (ref 0–0.5)
LDLC SERPL CALC-MCNC: 49.8 MG/DL (ref 0–100)
LYMPHOCYTES # BLD: 2.4 K/UL (ref 0.8–3.5)
LYMPHOCYTES NFR BLD: 34 % (ref 12–49)
MAGNESIUM SERPL-MCNC: 1.9 MG/DL (ref 1.6–2.4)
MCH RBC QN AUTO: 29.3 PG (ref 26–34)
MCHC RBC AUTO-ENTMCNC: 33.2 G/DL (ref 30–36.5)
MCV RBC AUTO: 88.1 FL (ref 80–99)
MONOCYTES # BLD: 0.7 K/UL (ref 0–1)
MONOCYTES NFR BLD: 10 % (ref 5–13)
NEUTS SEG # BLD: 3.7 K/UL (ref 1.8–8)
NEUTS SEG NFR BLD: 53 % (ref 32–75)
NRBC # BLD: 0 K/UL (ref 0–0.01)
NRBC BLD-RTO: 0 PER 100 WBC
PLATELET # BLD AUTO: 301 K/UL (ref 150–400)
PMV BLD AUTO: 9.9 FL (ref 8.9–12.9)
POTASSIUM SERPL-SCNC: 4.5 MMOL/L (ref 3.5–5.1)
PROT SERPL-MCNC: 6.7 G/DL (ref 6.4–8.2)
RBC # BLD AUTO: 4.27 M/UL (ref 3.8–5.2)
SODIUM SERPL-SCNC: 133 MMOL/L (ref 136–145)
TRIGL SERPL-MCNC: 191 MG/DL (ref ?–150)
VLDLC SERPL CALC-MCNC: 38.2 MG/DL
WBC # BLD AUTO: 7.1 K/UL (ref 3.6–11)

## 2021-08-31 NOTE — PROGRESS NOTES
A1c still in prediabetic range at 5.9%. cancer antigen is normal.  Blood count is normal.  Magnesium and potassium normal.  Sodium improved. Kidneys and liver look good. Cholesterol looks good.

## 2021-10-07 DIAGNOSIS — E78.5 HYPERLIPIDEMIA, UNSPECIFIED HYPERLIPIDEMIA TYPE: ICD-10-CM

## 2021-10-07 DIAGNOSIS — K21.9 GASTROESOPHAGEAL REFLUX DISEASE WITHOUT ESOPHAGITIS: ICD-10-CM

## 2021-10-07 DIAGNOSIS — C56.9 MALIGNANT NEOPLASM OF OVARY, UNSPECIFIED LATERALITY (HCC): ICD-10-CM

## 2021-10-07 RX ORDER — CHOLESTYRAMINE 4 G/9G
POWDER, FOR SUSPENSION ORAL
Qty: 756 G | Refills: 0 | Status: SHIPPED | OUTPATIENT
Start: 2021-10-07 | End: 2021-10-11 | Stop reason: SDUPTHER

## 2021-10-11 DIAGNOSIS — K21.9 GASTROESOPHAGEAL REFLUX DISEASE WITHOUT ESOPHAGITIS: ICD-10-CM

## 2021-10-11 DIAGNOSIS — C56.9 MALIGNANT NEOPLASM OF OVARY, UNSPECIFIED LATERALITY (HCC): ICD-10-CM

## 2021-10-11 DIAGNOSIS — E78.5 HYPERLIPIDEMIA, UNSPECIFIED HYPERLIPIDEMIA TYPE: ICD-10-CM

## 2021-10-11 RX ORDER — CHOLESTYRAMINE 4 G/9G
POWDER, FOR SUSPENSION ORAL
Qty: 756 G | Refills: 5 | Status: SHIPPED | OUTPATIENT
Start: 2021-10-11 | End: 2021-10-18 | Stop reason: SDUPTHER

## 2021-10-18 ENCOUNTER — OFFICE VISIT (OUTPATIENT)
Dept: FAMILY MEDICINE CLINIC | Age: 81
End: 2021-10-18
Payer: MEDICARE

## 2021-10-18 VITALS
OXYGEN SATURATION: 98 % | HEART RATE: 85 BPM | SYSTOLIC BLOOD PRESSURE: 138 MMHG | TEMPERATURE: 97.8 F | RESPIRATION RATE: 18 BRPM | HEIGHT: 62 IN | BODY MASS INDEX: 26.31 KG/M2 | DIASTOLIC BLOOD PRESSURE: 62 MMHG | WEIGHT: 143 LBS

## 2021-10-18 DIAGNOSIS — C56.9 MALIGNANT NEOPLASM OF OVARY, UNSPECIFIED LATERALITY (HCC): ICD-10-CM

## 2021-10-18 DIAGNOSIS — K21.9 GASTROESOPHAGEAL REFLUX DISEASE WITHOUT ESOPHAGITIS: ICD-10-CM

## 2021-10-18 DIAGNOSIS — Z23 NEEDS FLU SHOT: ICD-10-CM

## 2021-10-18 DIAGNOSIS — E78.5 HYPERLIPIDEMIA, UNSPECIFIED HYPERLIPIDEMIA TYPE: Primary | ICD-10-CM

## 2021-10-18 PROCEDURE — G0008 ADMIN INFLUENZA VIRUS VAC: HCPCS | Performed by: NURSE PRACTITIONER

## 2021-10-18 PROCEDURE — 99213 OFFICE O/P EST LOW 20 MIN: CPT | Performed by: NURSE PRACTITIONER

## 2021-10-18 PROCEDURE — 90694 VACC AIIV4 NO PRSRV 0.5ML IM: CPT | Performed by: NURSE PRACTITIONER

## 2021-10-18 RX ORDER — CHOLESTYRAMINE 4 G/9G
POWDER, FOR SUSPENSION ORAL
Qty: 756 G | Refills: 12 | Status: SHIPPED | OUTPATIENT
Start: 2021-10-18 | End: 2022-02-11 | Stop reason: SDUPTHER

## 2021-10-18 NOTE — PATIENT INSTRUCTIONS
Vaccine Information Statement    Influenza (Flu) Vaccine (Inactivated or Recombinant): What You Need to Know    Many vaccine information statements are available in [place] and other languages. See www.immunize.org/vis. Hojas de información sobre vacunas están disponibles en español y en muchos otros idiomas. Visite www.immunize.org/vis. 1. Why get vaccinated? Influenza vaccine can prevent influenza (flu). Flu is a contagious disease that spreads around the [facility] every year, usually between October and May. Anyone can get the flu, but it is more dangerous for some people. Infants and young children, people 72 years and older, pregnant people, and people with certain health conditions or a weakened immune system are at greatest risk of flu complications. Pneumonia, bronchitis, sinus infections, and ear infections are examples of flu-related complications. If you have a medical condition, such as heart disease, cancer, or diabetes, flu can make it worse. Flu can cause fever and chills, sore throat, muscle aches, fatigue, cough, headache, and runny or stuffy nose. Some people may have vomiting and diarrhea, though this is more common in children than adults. In an average year, thousands of people in the [facility] die from flu, and many more are hospitalized. Flu vaccine prevents millions of illnesses and flu-related visits to the doctor each year. 2. Influenza vaccines     CDC recommends everyone 6 months and older get vaccinated every flu season. Children 6 months through 6years of age may need 2 doses during a single flu season. Everyone else needs only 1 dose each flu season. It takes about 2 weeks for protection to develop after vaccination. There are many flu viruses, and they are always changing. Each year a new flu vaccine is made to protect against the influenza viruses believed to be likely to cause disease in the upcoming flu season.  Even when the vaccine doesnt exactly match these viruses, it may still provide some protection. Influenza vaccine does not cause flu. Influenza vaccine may be given at the same time as other vaccines. 3. Talk with your health care provider    Tell your vaccination provider if the person getting the vaccine:   Has had an allergic reaction after a previous dose of influenza vaccine, or has any severe, life-threatening allergies    Has ever had Guillain-Barré Syndrome (also called GBS)    In some cases, your health care provider may decide to postpone influenza vaccination until a future visit. Influenza vaccine can be administered at any time during pregnancy. People who are or will be pregnant during influenza season should receive inactivated influenza vaccine. People with minor illnesses, such as a cold, may be vaccinated. People who are moderately or severely ill should usually wait until they recover before getting influenza vaccine. Your health care provider can give you more information. 4. Risks of a vaccine reaction     Soreness, redness, and swelling where the shot is given, fever, muscle aches, and headache can happen after influenza vaccination.  There may be a very small increased risk of Guillain-Barré Syndrome (GBS) after inactivated influenza vaccine (the flu shot). [facility] children who get the flu shot along with pneumococcal vaccine (PCV13) and/or DTaP vaccine at the same time might be slightly more likely to have a seizure caused by fever. Tell your health care provider if a child who is getting flu vaccine has ever had a seizure. People sometimes faint after medical procedures, including vaccination. Tell your provider if you feel dizzy or have vision changes or ringing in the ears. As with any medicine, there is a very remote chance of a vaccine causing a severe allergic reaction, other serious injury, or death. 5. What if there is a serious problem?     An allergic reaction could occur after the vaccinated person leaves the clinic. If you see signs of a severe allergic reaction (hives, swelling of the face and throat, difficulty breathing, a fast heartbeat, dizziness, or weakness), call 9-1-1 and get the person to the nearest hospital.    For other signs that concern you, call your health care provider. Adverse reactions should be reported to the Vaccine Adverse Event Reporting System (VAERS). Your health care provider will usually file this report, or you can do it yourself. Visit the VAERS website at www.vaers. [facility].gov or call 8-309.824.9144. VAERS is only for reporting reactions, and VAERS staff members do not give medical advice. 6. The National Vaccine Injury Compensation Program    The [facility] Vaccine Injury Compensation Program (VICP) is a federal program that was created to compensate people who may have been injured by certain vaccines. Claims regarding alleged injury or death due to vaccination have a time limit for filing, which may be as short as two years. Visit the VICP website at www.[facility].gov/vaccinecompensation or call 9-996.713.5132 to learn about the program and about filing a claim. 7. How can I learn more?  Ask your health care provider.  Call your local or state health department.  Visit the website of the Food and Drug Administration (FDA) for vaccine package inserts and additional information at www.fda.gov/vaccines-blood-biologics/vaccines.  Contact the Centers for Disease Control and Prevention (CDC):  - Call 3-684.151.2224 (1-800-CDC-INFO) or  - Visit CDCs influenza website at www.cdc.gov/flu. Vaccine Information Statement   Inactivated Influenza Vaccine   8/6/2021  42 RADHA Landaverde [id number]   Department of Health and Human Services  Centers for Disease Control and Prevention    Office Use Only

## 2021-10-18 NOTE — PROGRESS NOTES
Chief Complaint   Patient presents with    Medication Check         HPI:      Sergio Hill is a [de-identified] y.o. female. Thoracic BP/ lower neck pain  Last xrays showed DJD. Hasn't done therapy for that lately. could not tolerate gabapentin or Lyrica.        HTN  BP's have been elevated recently. Currently on Losartan, HCTZ, Metoprolol, Amlodipine and Spironolactone. Taking regularly      Hyperlipidemia. On Crestor.  Aren well. No myalgias, arthralgias, unusual weakness.      Hx ovarian cancer. Doing well on questran at B[facility]. Last Ca125 was ok last check      Insomnia  Doing well on ambien 5mg QHS. New Issues:  She is unhappy with the brand of Cholestyramine. She has reacted to all brands except Sandoz or Par. Allergies   Allergen Reactions    Feldene [Piroxicam] Other (comments)     Stomach problem    Toradol [Ketorolac Tromethamine] Itching    Voltaren [Diclofenac Sodium] Diarrhea       Current Outpatient Medications   Medication Sig    cholestyramine-sucrose 4 gram powder MIX 1 SCOOP (4 GRAMS TOTAL) WITH LIQUID AND DRINK TWICE DAILY    zolpidem (AMBIEN) 5 mg tablet TAKE 1 TABLET BY MOUTH NIGHTLY AS NEEDED FOR SLEEP. MAX DAILY AMOUNT 1 TABLET    losartan-hydroCHLOROthiazide (HYZAAR) 100-12.5 mg per tablet Take 1 tablet by mouth once daily    triamcinolone acetonide (KENALOG) 0.1 % topical cream Apply  to affected area two (2) times a day. use thin layer to itchy skin as needed    spironolactone (ALDACTONE) 25 mg tablet TAKE 1/2 TABLET BY MOUTH DAILY.  metoprolol succinate (TOPROL-XL) 50 mg XL tablet Take 1 Tab by mouth daily.  rosuvastatin (CRESTOR) 5 mg tablet Take 1 Tab by mouth nightly. Indications: excessive fat in the blood    amLODIPine (NORVASC) 10 mg tablet TAKE 1 TABLET BY MOUTH  DAILY    acetaminophen (TylenoL) 325 mg tablet Take 650 mg by mouth every four (4) hours as needed for Pain.     omeprazole (PRILOSEC) 20 mg capsule TAKE 1 CAPSULE BY MOUTH TWO TIMES DAILY INDICATIONS: PREVENTION OF NSAID-INDUCED GASTRIC ULCER    ALPRAZolam (XANAX) 0.5 mg tablet Take 1 Tab by mouth two (2) times daily as needed for Anxiety. Max Daily Amount: 1 mg. No current facility-administered medications for this visit. 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     Postmenopausal vaginal bleeding 9/19/2013    Due to Granulation tissue from her pessary, treated w/ silver nitrate stick on 9.19.2013    Presence of pessary     #2    PARUL (stress urinary incontinence, female)        Past Surgical History:   Procedure Laterality Date    HX APPENDECTOMY      HX DILATION AND CURETTAGE  1963    colonization    HX SALPINGO-OOPHORECTOMY  7/03    Exploratory Lap, omentectomy, tumor debulking Leonardo Estrin)    HX TONSILLECTOMY  1964    HX TOTAL COLECTOMY      Anita Tracey)       Social History     Socioeconomic History    Marital status: SINGLE     Spouse name: Not on file    Number of children: 0    Years of education: Not on file    Highest education level: Not on file   Occupational History    Occupation: front office, coding     Employer: RETIRED     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, [employer]   Tobacco Use    Smoking status: Former Smoker    Smokeless tobacco: Never Used   Vaping Use    Vaping Use: Never used   Substance and Sexual Activity    Alcohol use: No     Alcohol/week: 0.0 standard drinks     Comment: minimal    Drug use: No    Sexual activity: Not Currently   Other Topics Concern     Service No    Blood Transfusions No    Caffeine Concern No    Occupational Exposure No    Hobby Hazards No    Sleep Concern No    Stress Concern No    Weight Concern No    Special Diet No    Back Care No    Exercise No    Bike Helmet No    Seat Belt Yes    Self-Exams No     Social Determinants of Health     Financial Resource Strain:     Difficulty of Paying Living Expenses:    Food Insecurity:     Worried About Running Out of Food in the Last Year:     Ran Out of Food in the Last Year:    Transportation Needs:     Lack of Transportation (Medical):  Lack of Transportation (Non-Medical):    Physical Activity:     Days of Exercise per Week:     Minutes of Exercise per Session:    Stress:     Feeling of Stress :    Social Connections:     Frequency of Communication with Friends and Family:     Frequency of Social Gatherings with Friends and Family:     Attends [employer] Services:     Active Member of Clubs or Organizations:     Attends Club or Organization Meetings:     Marital Status:        Family History   Problem Relation Age of Onset    Stroke Mother     Heart Disease Father        Above history reviewed. ROS:  Denies fever, chills, cough, chest pain, SOB,  nausea, vomiting, or diarrhea. Denies wt loss, wt gain, hemoptysis, hematochezia or melena. Physical Examination:    /62   Pulse 85   Temp 97.8 °F (36.6 °C) (Temporal)   Resp 18   Ht 5' 2\" (1.575 m)   Wt 143 lb (64.9 kg)   SpO2 98%   BMI 26.16 kg/m²     General: Alert and Ox3, Fluent speech  Neck:  Supple, no adenopathy, JVD, mass or bruit  Chest:  Clear to Ausculation, without wheezes, rales, rubs or ronchi  Cardiac: RRR  Extremities:  No cyanosis, clubbing or edema  Neurologic:  Ambulatory without assist, CN 2-12 grossly intact. Moves all extremities.   Skin: no rash  Lymphadenopathy: no cervical or supraclavicular nodes    ASSESSMENT AND PLAN:     1. Needs flu shot  - FLU (FLUAD QUAD INFLUENZA VACCINE,QUAD,ADJUVANTED)    2. Hyperlipidemia, unspecified hyperlipidemia type  - cholestyramine-sucrose 4 gram powder; MIX 1 SCOOP (4 GRAMS TOTAL) WITH LIQUID AND DRINK TWICE DAILY  Dispense: 756 g; Refill: 12    3. Gastroesophageal reflux disease without esophagitis  - cholestyramine-sucrose 4 gram powder; MIX 1 SCOOP (4 GRAMS TOTAL) WITH LIQUID AND DRINK TWICE DAILY  Dispense: 756 g; Refill: 12    4.  Malignant neoplasm of ovary, unspecified laterality (HCC)  - cholestyramine-sucrose 4 gram powder; MIX 1 SCOOP (4 GRAMS TOTAL) WITH LIQUID AND DRINK TWICE DAILY  Dispense: 756 g; Refill: 12       RTC PRN    Clara Bryson NP

## 2021-10-25 ENCOUNTER — OFFICE VISIT (OUTPATIENT)
Dept: FAMILY MEDICINE CLINIC | Age: 81
End: 2021-10-25
Payer: MEDICARE

## 2021-10-25 VITALS — HEART RATE: 62 BPM | OXYGEN SATURATION: 97 % | TEMPERATURE: 98.4 F | RESPIRATION RATE: 18 BRPM

## 2021-10-25 DIAGNOSIS — Z20.822 ENCOUNTER FOR SCREENING LABORATORY TESTING FOR SEVERE ACUTE RESPIRATORY SYNDROME CORONAVIRUS 2 (SARS-COV-2): Primary | ICD-10-CM

## 2021-10-25 DIAGNOSIS — K52.9 GASTROENTERITIS: ICD-10-CM

## 2021-10-25 LAB
FLUAV+FLUBV AG NOSE QL IA.RAPID: NEGATIVE
FLUAV+FLUBV AG NOSE QL IA.RAPID: NEGATIVE
VALID INTERNAL CONTROL?: YES

## 2021-10-25 PROCEDURE — 99213 OFFICE O/P EST LOW 20 MIN: CPT | Performed by: NURSE PRACTITIONER

## 2021-10-25 NOTE — PROGRESS NOTES
Chief Complaint   Patient presents with    Concern For COVID-19 (Coronavirus)     not feeling well since friday, belching, diarrhea, no cough, no fever    Diarrhea     saturday          HPI:      Sara Garcia is a [de-identified] y.o. female. New Issues:  She is being seen in the car today for sick symptoms. She has been feeling ill since Friday. Started after eating BBQ, but does not feel this was related. She has been having diarrhea, belching, neck pain, \"sweats\" and fatigue. No BM yet today. She has had a history of recurrent diverticulitis, but denies current abdominal pain in her typical region. Allergies   Allergen Reactions    Feldene [Piroxicam] Other (comments)     Stomach problem    Toradol [Ketorolac Tromethamine] Itching    Voltaren [Diclofenac Sodium] Diarrhea       Current Outpatient Medications   Medication Sig    cholestyramine-sucrose 4 gram powder MIX 1 SCOOP (4 GRAMS TOTAL) WITH LIQUID AND DRINK TWICE DAILY    zolpidem (AMBIEN) 5 mg tablet TAKE 1 TABLET BY MOUTH NIGHTLY AS NEEDED FOR SLEEP. MAX DAILY AMOUNT 1 TABLET    losartan-hydroCHLOROthiazide (HYZAAR) 100-12.5 mg per tablet Take 1 tablet by mouth once daily    triamcinolone acetonide (KENALOG) 0.1 % topical cream Apply  to affected area two (2) times a day. use thin layer to itchy skin as needed    spironolactone (ALDACTONE) 25 mg tablet TAKE 1/2 TABLET BY MOUTH DAILY.  metoprolol succinate (TOPROL-XL) 50 mg XL tablet Take 1 Tab by mouth daily.  rosuvastatin (CRESTOR) 5 mg tablet Take 1 Tab by mouth nightly. Indications: excessive fat in the blood    amLODIPine (NORVASC) 10 mg tablet TAKE 1 TABLET BY MOUTH  DAILY    acetaminophen (TylenoL) 325 mg tablet Take 650 mg by mouth every four (4) hours as needed for Pain.     omeprazole (PRILOSEC) 20 mg capsule TAKE 1 CAPSULE BY MOUTH TWO TIMES DAILY INDICATIONS:  PREVENTION OF NSAID-INDUCED GASTRIC ULCER    ALPRAZolam (XANAX) 0.5 mg tablet Take 1 Tab by mouth two (2) times daily as needed for Anxiety. Max Daily Amount: 1 mg. No current facility-administered medications for this visit. 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     Postmenopausal vaginal bleeding 9/19/2013    Due to Granulation tissue from her pessary, treated w/ silver nitrate stick on 9.19.2013    Presence of pessary     #2    PARUL (stress urinary incontinence, female)        Past Surgical History:   Procedure Laterality Date    HX APPENDECTOMY      HX DILATION AND CURETTAGE  1963    colonization    HX SALPINGO-OOPHORECTOMY  7/03    Exploratory Lap, omentectomy, tumor debulking Ala Feathers)    HX TONSILLECTOMY  1964    HX TOTAL COLECTOMY      Dc Erath)       Social History     Socioeconomic History    Marital status: SINGLE     Spouse name: Not on file    Number of children: 0    Years of education: Not on file    Highest education level: Not on file   Occupational History    Occupation: front office, 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   Tobacco Use    Smoking status: Former Smoker    Smokeless tobacco: Never Used   Vaping Use    Vaping Use: Never used   Substance and Sexual Activity    Alcohol use: No     Alcohol/week: 0.0 standard drinks     Comment: minimal    Drug use: No    Sexual activity: Not Currently   Other Topics Concern     Service No    Blood Transfusions No    Caffeine Concern No    Occupational Exposure No    Hobby Hazards No    Sleep Concern No    Stress Concern No    Weight Concern No    Special Diet No    Back Care No    Exercise No    Bike Helmet No    Seat Belt Yes    Self-Exams No     Social Determinants of Health     Financial Resource Strain:     Difficulty of Paying Living Expenses:    Food Insecurity:     Worried About Running Out of Food in the Last Year:     Ran Out of Food in the Last Year:    Transportation Needs:     Lack of Transportation (Medical):  Lack of Transportation (Non-Medical):    Physical Activity:     Days of Exercise per Week:     Minutes of Exercise per Session:    Stress:     Feeling of Stress :    Social Connections:     Frequency of Communication with Friends and Family:     Frequency of Social Gatherings with Friends and Family:     Attends Christianity Services:     Active Member of Clubs or Organizations:     Attends Club or Organization Meetings:     Marital Status:        Family History   Problem Relation Age of Onset    Stroke Mother     Heart Disease Father        Above history reviewed. ROS:  Denies fever, chills, cough, chest pain, SOB,  nausea, vomiting, or diarrhea. Denies wt loss, wt gain, hemoptysis, hematochezia or melena. Physical Examination:    Pulse (!) 47   Temp 98.4 °F (36.9 °C) (Temporal)   Resp 18   SpO2 97%     General: Alert and Ox3, Fluent speech  HEENT:  PERRLA, EOM intact, TMs, turbinates, pharynx normal.  No thyromegaly. No cervical adenopathy. Neck:  Supple, no adenopathy, JVD, mass or bruit  Chest:  Clear to Ausculation, without wheezes, rales, rubs or ronchi  Cardiac: RRR  Abdomen:  +BS, soft, nontender without palpable HSM  Extremities:  No cyanosis, clubbing or edema  Neurologic:  Ambulatory without assist, CN 2-12 grossly intact. Moves all extremities. Skin: no rash  Lymphadenopathy: no cervical or supraclavicular nodes    ASSESSMENT AND PLAN:     1.  Encounter for screening laboratory testing for severe acute respiratory syndrome coronavirus 2 (SARS-CoV-2)  Rule out COVID  - NOVEL CORONAVIRUS (COVID-19)  - AMB POC BRANDON INFLUENZA A/B TEST    2.  Gastroenteritis  Appears to be improving  Tolerating oral liquids  No diarrhea today  [employer]  Rest  Inform clinic if symptoms are worsening      RTC PRN    Westley Cortez NP

## 2021-10-25 NOTE — PROGRESS NOTES
Saba Felix is a [de-identified] y.o. female presenting for/with:    Chief Complaint   Patient presents with    Concern For COVID-19 (Coronavirus)     not feeling well since friday, belching, diarrhea, no cough, no fever    Diarrhea     saturday        Visit Vitals  Pulse (!) 47   Temp 98.4 °F (36.9 °C) (Temporal)   Resp 18   SpO2 97%     Pain Scale: /10  Pain Location:     1. Have you been to the ER, urgent care clinic since your last visit? Hospitalized since your last visit? NO    2. Have you seen or consulted any other health care providers outside of the [address] [place] [address] since your last visit? Include any pap smears or colon screening. NO    Symptom review:  NO  Fever   NO  Shaking chills  NO  Cough  NO  Body aches  NO  Coughing up blood  NO  Chest congestion  NO  Chest pain  NO  Shortness of breath  NO  Profound Loss of smell/taste  NO  Nausea/Vomiting   YES  Loose stool/Diarrhea  NO  any skin issues    Patient Risk Factors Reviewed as follows:  NO  have you been in Close contact with confirmed COVID19 patient   NO  History of recent travel to affected geographical areas within the past 14 days  NO  COPD  NO  Active Cancer/Leukemia/Lymphoma/Chemotherapy  NO  Oral steroid use  NO  Pregnant  NO  Diabetes Mellitus  NO  Heart disease  NO  Asthma  NO Health care worker at home  [address] care worker  NO Is there a Pregnant Woman in the home  NO Dialysis pt in the home   NO a large number of people living in the home    Learning Assessment 10/18/2021   PRIMARY LEARNER Patient   PRIMARY LANGUAGE ENGLISH   LEARNER PREFERENCE PRIMARY READING     -   ANSWERED BY pt   RELATIONSHIP SELF     Fall Risk Assessment, last 12 mths 10/25/2021   Able to walk? Yes   Fall in past 12 months? 0   Do you feel unsteady? 0   Are you worried about falling 0   Is TUG test greater than 12 seconds? -   Is the gait abnormal? -   Number of falls in past 12 months -   Fall with injury?  -       3 most recent PHQ Screens 10/25/2021   PHQ Not Done -   Little interest or pleasure in doing things Not at all   Feeling down, depressed, irritable, or hopeless Not at all   Total Score PHQ 2 0     Abuse Screening Questionnaire 10/25/2021   Do you ever feel afraid of your partner? N   Are you in a relationship with someone who physically or mentally threatens you? N   Is it safe for you to go home? Y       ADL Assessment 10/25/2021   Feeding yourself No Help Needed   Getting from bed to chair No Help Needed   Getting dressed No Help Needed   Bathing or showering No Help Needed   Walk across the room (includes cane/walker) No Help Needed   Using the telphone No Help Needed   Taking your medications No Help Needed   Preparing meals No Help Needed   Managing money (expenses/bills) No Help Needed   Moderately strenuous housework (laundry) No Help Needed   Shopping for personal items (toiletries/medicines) No Help Needed   Shopping for groceries No Help Needed   Driving No Help Needed   Climbing a flight of stairs No Help Needed   Getting to places beyond walking distances No Help Needed      Advanced directives on file. Verified emergency contact.        Results for orders placed or performed in visit on 10/25/21   AMB POC BRANDON INFLUENZA A/B TEST   Result Value Ref Range    VALID INTERNAL CONTROL POC Yes     Influenza A Ag POC Negative Negative    Influenza B Ag POC Negative Negative

## 2021-10-28 LAB
SARS-COV-2, NAA 2 DAY TAT: NORMAL
SARS-COV-2, NAA: NOT DETECTED

## 2021-10-30 ENCOUNTER — HOSPITAL ENCOUNTER (OUTPATIENT)
Age: 81
Setting detail: OBSERVATION
Discharge: HOME | End: 2021-11-01
Attending: FAMILY MEDICINE | Admitting: INTERNAL MEDICINE
Payer: MEDICARE

## 2021-10-30 ENCOUNTER — APPOINTMENT (OUTPATIENT)
Dept: CT IMAGING | Age: 81
End: 2021-10-30
Attending: FAMILY MEDICINE
Payer: MEDICARE

## 2021-10-30 DIAGNOSIS — E87.1 HYPONATREMIA: Primary | ICD-10-CM

## 2021-10-30 LAB
ALBUMIN SERPL-MCNC: 3.9 G/DL (ref 3.5–5)
ALBUMIN/GLOB SERPL: 1.3 {RATIO} (ref 1.1–2.2)
ALP SERPL-CCNC: 92 U/L (ref 45–117)
ALT SERPL-CCNC: 35 U/L (ref 12–78)
ANION GAP SERPL CALC-SCNC: 10 MMOL/L (ref 5–15)
APPEARANCE UR: CLEAR
AST SERPL-CCNC: 23 U/L (ref 15–37)
BACTERIA URNS QL MICRO: NEGATIVE /HPF
BASOPHILS # BLD: 0 K/UL (ref 0–0.1)
BASOPHILS NFR BLD: 0 % (ref 0–1)
BILIRUB SERPL-MCNC: 0.4 MG/DL (ref 0.2–1)
BILIRUB UR QL: NEGATIVE
BUN SERPL-MCNC: 14 MG/DL (ref 6–20)
BUN/CREAT SERPL: 13 (ref 12–20)
CALCIUM SERPL-MCNC: 8.6 MG/DL (ref 8.5–10.1)
CHLORIDE SERPL-SCNC: 90 MMOL/L (ref 97–108)
CO2 SERPL-SCNC: 25 MMOL/L (ref 21–32)
COLOR UR: ABNORMAL
CREAT SERPL-MCNC: 1.04 MG/DL (ref 0.55–1.02)
DIFFERENTIAL METHOD BLD: NORMAL
EOSINOPHIL # BLD: 0.1 K/UL (ref 0–0.4)
EOSINOPHIL NFR BLD: 1 % (ref 0–7)
EPITH CASTS URNS QL MICRO: NORMAL /LPF
ERYTHROCYTE [DISTWIDTH] IN BLOOD BY AUTOMATED COUNT: 13.3 % (ref 11.5–14.5)
GLOBULIN SER CALC-MCNC: 3.1 G/DL (ref 2–4)
GLUCOSE SERPL-MCNC: 137 MG/DL (ref 65–100)
GLUCOSE UR STRIP.AUTO-MCNC: NEGATIVE MG/DL
HCT VFR BLD AUTO: 35.9 % (ref 35–47)
HGB BLD-MCNC: 12.4 G/DL (ref 11.5–16)
HGB UR QL STRIP: ABNORMAL
IMM GRANULOCYTES # BLD AUTO: 0 K/UL (ref 0–0.04)
IMM GRANULOCYTES NFR BLD AUTO: 0 % (ref 0–0.5)
KETONES UR QL STRIP.AUTO: NEGATIVE MG/DL
LEUKOCYTE ESTERASE UR QL STRIP.AUTO: ABNORMAL
LYMPHOCYTES # BLD: 3 K/UL (ref 0.8–3.5)
LYMPHOCYTES NFR BLD: 31 % (ref 12–49)
MAGNESIUM SERPL-MCNC: 1.8 MG/DL (ref 1.6–2.4)
MCH RBC QN AUTO: 28.8 PG (ref 26–34)
MCHC RBC AUTO-ENTMCNC: 34.5 G/DL (ref 30–36.5)
MCV RBC AUTO: 83.3 FL (ref 80–99)
MONOCYTES # BLD: 0.9 K/UL (ref 0–1)
MONOCYTES NFR BLD: 10 % (ref 5–13)
NEUTS SEG # BLD: 5.5 K/UL (ref 1.8–8)
NEUTS SEG NFR BLD: 58 % (ref 32–75)
NITRITE UR QL STRIP.AUTO: NEGATIVE
NRBC # BLD: 0 K/UL (ref 0–0.01)
NRBC BLD-RTO: 0 PER 100 WBC
PH UR STRIP: 5.5 [PH] (ref 5–8)
PLATELET # BLD AUTO: 301 K/UL (ref 150–400)
PMV BLD AUTO: 9.2 FL (ref 8.9–12.9)
POTASSIUM SERPL-SCNC: 4.3 MMOL/L (ref 3.5–5.1)
PROT SERPL-MCNC: 7 G/DL (ref 6.4–8.2)
PROT UR STRIP-MCNC: NEGATIVE MG/DL
RBC # BLD AUTO: 4.31 M/UL (ref 3.8–5.2)
RBC #/AREA URNS HPF: NORMAL /HPF (ref 0–5)
SODIUM SERPL-SCNC: 125 MMOL/L (ref 136–145)
SP GR UR REFRACTOMETRY: 1.01 (ref 1–1.03)
TROPONIN-HIGH SENSITIVITY: 10 NG/L (ref 0–51)
UROBILINOGEN UR QL STRIP.AUTO: 0.2 EU/DL (ref 0.2–1)
WBC # BLD AUTO: 9.5 K/UL (ref 3.6–11)
WBC URNS QL MICRO: NORMAL /HPF (ref 0–4)

## 2021-10-30 PROCEDURE — 80053 COMPREHEN METABOLIC PANEL: CPT

## 2021-10-30 PROCEDURE — 81001 URINALYSIS AUTO W/SCOPE: CPT

## 2021-10-30 PROCEDURE — 70450 CT HEAD/BRAIN W/O DYE: CPT

## 2021-10-30 PROCEDURE — 96374 THER/PROPH/DIAG INJ IV PUSH: CPT

## 2021-10-30 PROCEDURE — 74177 CT ABD & PELVIS W/CONTRAST: CPT

## 2021-10-30 PROCEDURE — 85025 COMPLETE CBC W/AUTO DIFF WBC: CPT

## 2021-10-30 PROCEDURE — 84484 ASSAY OF TROPONIN QUANT: CPT

## 2021-10-30 PROCEDURE — 99284 EMERGENCY DEPT VISIT MOD MDM: CPT

## 2021-10-30 PROCEDURE — 74011000636 HC RX REV CODE- 636: Performed by: FAMILY MEDICINE

## 2021-10-30 PROCEDURE — 36415 COLL VENOUS BLD VENIPUNCTURE: CPT

## 2021-10-30 PROCEDURE — 93005 ELECTROCARDIOGRAM TRACING: CPT

## 2021-10-30 PROCEDURE — 83735 ASSAY OF MAGNESIUM: CPT

## 2021-10-30 PROCEDURE — 74011250636 HC RX REV CODE- 250/636: Performed by: FAMILY MEDICINE

## 2021-10-30 RX ORDER — ONDANSETRON 2 MG/ML
8 INJECTION INTRAMUSCULAR; INTRAVENOUS
Status: DISCONTINUED | OUTPATIENT
Start: 2021-10-30 | End: 2021-11-01 | Stop reason: HOSPADM

## 2021-10-30 RX ORDER — SODIUM CHLORIDE 0.9 % (FLUSH) 0.9 %
5-10 SYRINGE (ML) INJECTION ONCE
Status: DISPENSED | OUTPATIENT
Start: 2021-10-30 | End: 2021-10-31

## 2021-10-30 RX ADMIN — ONDANSETRON 8 MG: 2 INJECTION INTRAMUSCULAR; INTRAVENOUS at 23:20

## 2021-10-30 NOTE — Clinical Note
Patient Class[de-identified] OBSERVATION [104]   Type of Bed: Medical [8]   Cardiac Monitoring Required?: No   Reason for Observation: iv fluids   Admitting Diagnosis: Hyponatremia [[id number]]   Admitting Physician: Taylor Link [[id number]   Attending Physician: Taylor Link [[id number]]

## 2021-10-31 PROBLEM — E87.1 HYPONATREMIA: Status: ACTIVE | Noted: 2021-10-31

## 2021-10-31 LAB
ALBUMIN SERPL-MCNC: 3.4 G/DL (ref 3.5–5)
ALBUMIN/GLOB SERPL: 1 {RATIO} (ref 1.1–2.2)
ALP SERPL-CCNC: 87 U/L (ref 45–117)
ALT SERPL-CCNC: 30 U/L (ref 12–78)
ANION GAP SERPL CALC-SCNC: 9 MMOL/L (ref 5–15)
AST SERPL-CCNC: 21 U/L (ref 15–37)
BILIRUB SERPL-MCNC: 0.3 MG/DL (ref 0.2–1)
BUN SERPL-MCNC: 12 MG/DL (ref 6–20)
BUN/CREAT SERPL: 11 (ref 12–20)
CALCIUM SERPL-MCNC: 9 MG/DL (ref 8.5–10.1)
CHLORIDE SERPL-SCNC: 98 MMOL/L (ref 97–108)
CO2 SERPL-SCNC: 26 MMOL/L (ref 21–32)
CREAT SERPL-MCNC: 1.05 MG/DL (ref 0.55–1.02)
ERYTHROCYTE [DISTWIDTH] IN BLOOD BY AUTOMATED COUNT: 13.6 % (ref 11.5–14.5)
GLOBULIN SER CALC-MCNC: 3.5 G/DL (ref 2–4)
GLUCOSE SERPL-MCNC: 114 MG/DL (ref 65–100)
HCT VFR BLD AUTO: 35.3 % (ref 35–47)
HGB BLD-MCNC: 12 G/DL (ref 11.5–16)
MAGNESIUM SERPL-MCNC: 2 MG/DL (ref 1.6–2.4)
MCH RBC QN AUTO: 28 PG (ref 26–34)
MCHC RBC AUTO-ENTMCNC: 34 G/DL (ref 30–36.5)
MCV RBC AUTO: 82.5 FL (ref 80–99)
NRBC # BLD: 0 K/UL (ref 0–0.01)
NRBC BLD-RTO: 0 PER 100 WBC
OSMOLALITY SERPL: 277 MOSM/KG H2O
PLATELET # BLD AUTO: 286 K/UL (ref 150–400)
PMV BLD AUTO: 9 FL (ref 8.9–12.9)
POTASSIUM SERPL-SCNC: 4.3 MMOL/L (ref 3.5–5.1)
PROT SERPL-MCNC: 6.9 G/DL (ref 6.4–8.2)
RBC # BLD AUTO: 4.28 M/UL (ref 3.8–5.2)
SODIUM SERPL-SCNC: 133 MMOL/L (ref 136–145)
TSH SERPL DL<=0.05 MIU/L-ACNC: 2.43 UIU/ML (ref 0.36–3.74)
WBC # BLD AUTO: 7.7 K/UL (ref 3.6–11)

## 2021-10-31 PROCEDURE — 99218 HC RM OBSERVATION: CPT

## 2021-10-31 PROCEDURE — 74011000636 HC RX REV CODE- 636: Performed by: FAMILY MEDICINE

## 2021-10-31 PROCEDURE — 83735 ASSAY OF MAGNESIUM: CPT

## 2021-10-31 PROCEDURE — 96361 HYDRATE IV INFUSION ADD-ON: CPT

## 2021-10-31 PROCEDURE — 83930 ASSAY OF BLOOD OSMOLALITY: CPT

## 2021-10-31 PROCEDURE — 96372 THER/PROPH/DIAG INJ SC/IM: CPT

## 2021-10-31 PROCEDURE — 74011250636 HC RX REV CODE- 250/636: Performed by: INTERNAL MEDICINE

## 2021-10-31 PROCEDURE — 36415 COLL VENOUS BLD VENIPUNCTURE: CPT

## 2021-10-31 PROCEDURE — 85027 COMPLETE CBC AUTOMATED: CPT

## 2021-10-31 PROCEDURE — 96376 TX/PRO/DX INJ SAME DRUG ADON: CPT

## 2021-10-31 PROCEDURE — 74011250636 HC RX REV CODE- 250/636: Performed by: FAMILY MEDICINE

## 2021-10-31 PROCEDURE — 74011250637 HC RX REV CODE- 250/637: Performed by: FAMILY MEDICINE

## 2021-10-31 PROCEDURE — 80053 COMPREHEN METABOLIC PANEL: CPT

## 2021-10-31 PROCEDURE — 74011250637 HC RX REV CODE- 250/637: Performed by: INTERNAL MEDICINE

## 2021-10-31 PROCEDURE — 84443 ASSAY THYROID STIM HORMONE: CPT

## 2021-10-31 RX ORDER — ACETAMINOPHEN 325 MG/1
650 TABLET ORAL
Status: DISCONTINUED | OUTPATIENT
Start: 2021-10-31 | End: 2021-10-31

## 2021-10-31 RX ORDER — LOSARTAN POTASSIUM 100 MG/1
100 TABLET ORAL DAILY
Status: DISCONTINUED | OUTPATIENT
Start: 2021-10-31 | End: 2021-11-01 | Stop reason: HOSPADM

## 2021-10-31 RX ORDER — METOPROLOL SUCCINATE 50 MG/1
50 TABLET, EXTENDED RELEASE ORAL DAILY
Status: DISCONTINUED | OUTPATIENT
Start: 2021-10-31 | End: 2021-11-01 | Stop reason: HOSPADM

## 2021-10-31 RX ORDER — ATORVASTATIN CALCIUM 10 MG/1
10 TABLET, FILM COATED ORAL
Status: DISCONTINUED | OUTPATIENT
Start: 2021-10-31 | End: 2021-11-01 | Stop reason: HOSPADM

## 2021-10-31 RX ORDER — BUTALBITAL, ACETAMINOPHEN AND CAFFEINE 50; 325; 40 MG/1; MG/1; MG/1
1 TABLET ORAL
Status: DISCONTINUED | OUTPATIENT
Start: 2021-10-31 | End: 2021-11-01 | Stop reason: HOSPADM

## 2021-10-31 RX ORDER — ALPRAZOLAM 0.5 MG/1
0.5 TABLET ORAL
Status: DISCONTINUED | OUTPATIENT
Start: 2021-10-31 | End: 2021-11-01 | Stop reason: HOSPADM

## 2021-10-31 RX ORDER — AMLODIPINE BESYLATE 10 MG/1
10 TABLET ORAL DAILY
Status: DISCONTINUED | OUTPATIENT
Start: 2021-10-31 | End: 2021-11-01

## 2021-10-31 RX ORDER — KETOROLAC TROMETHAMINE 30 MG/ML
15 INJECTION, SOLUTION INTRAMUSCULAR; INTRAVENOUS
Status: ACTIVE | OUTPATIENT
Start: 2021-10-31 | End: 2021-10-31

## 2021-10-31 RX ORDER — CHOLESTYRAMINE 4 G/4.8G
4 POWDER, FOR SUSPENSION ORAL 2 TIMES DAILY WITH MEALS
Status: DISCONTINUED | OUTPATIENT
Start: 2021-10-31 | End: 2021-11-01 | Stop reason: HOSPADM

## 2021-10-31 RX ORDER — ENOXAPARIN SODIUM 100 MG/ML
40 INJECTION SUBCUTANEOUS EVERY 24 HOURS
Status: DISCONTINUED | OUTPATIENT
Start: 2021-10-31 | End: 2021-11-01 | Stop reason: HOSPADM

## 2021-10-31 RX ORDER — ACETAMINOPHEN 500 MG
1000 TABLET ORAL
Status: COMPLETED | OUTPATIENT
Start: 2021-10-31 | End: 2021-10-31

## 2021-10-31 RX ORDER — ZOLPIDEM TARTRATE 5 MG/1
5 TABLET ORAL
Status: DISCONTINUED | OUTPATIENT
Start: 2021-10-31 | End: 2021-11-01 | Stop reason: HOSPADM

## 2021-10-31 RX ORDER — ONDANSETRON 2 MG/ML
4 INJECTION INTRAMUSCULAR; INTRAVENOUS
Status: DISCONTINUED | OUTPATIENT
Start: 2021-10-31 | End: 2021-11-01 | Stop reason: HOSPADM

## 2021-10-31 RX ORDER — ACETAMINOPHEN 325 MG/1
650 TABLET ORAL
Status: DISCONTINUED | OUTPATIENT
Start: 2021-10-31 | End: 2021-10-31 | Stop reason: SDUPTHER

## 2021-10-31 RX ORDER — SODIUM CHLORIDE 9 MG/ML
100 INJECTION, SOLUTION INTRAVENOUS CONTINUOUS
Status: DISCONTINUED | OUTPATIENT
Start: 2021-10-31 | End: 2021-10-31

## 2021-10-31 RX ADMIN — CHOLESTYRAMINE 4 G: 4 POWDER, FOR SUSPENSION ORAL at 17:27

## 2021-10-31 RX ADMIN — METOPROLOL SUCCINATE 50 MG: 50 TABLET, EXTENDED RELEASE ORAL at 08:06

## 2021-10-31 RX ADMIN — ONDANSETRON 4 MG: 2 INJECTION INTRAMUSCULAR; INTRAVENOUS at 08:14

## 2021-10-31 RX ADMIN — ZOLPIDEM TARTRATE 5 MG: 5 TABLET ORAL at 20:45

## 2021-10-31 RX ADMIN — IOPAMIDOL 100 ML: 612 INJECTION, SOLUTION INTRAVENOUS at 00:37

## 2021-10-31 RX ADMIN — SODIUM CHLORIDE 1000 ML: 9 INJECTION, SOLUTION INTRAVENOUS at 00:54

## 2021-10-31 RX ADMIN — AMLODIPINE BESYLATE 10 MG: 10 TABLET ORAL at 08:06

## 2021-10-31 RX ADMIN — ACETAMINOPHEN 1000 MG: 500 TABLET ORAL at 01:43

## 2021-10-31 RX ADMIN — LOSARTAN POTASSIUM 100 MG: 100 TABLET, FILM COATED ORAL at 08:06

## 2021-10-31 RX ADMIN — SODIUM CHLORIDE 100 ML/HR: 9 INJECTION, SOLUTION INTRAVENOUS at 08:00

## 2021-10-31 RX ADMIN — CHOLESTYRAMINE 4 G: 4 POWDER, FOR SUSPENSION ORAL at 08:34

## 2021-10-31 RX ADMIN — BUTALBITAL, ACETAMINOPHEN, AND CAFFEINE 1 TABLET: 50; 325; 40 TABLET ORAL at 13:28

## 2021-10-31 RX ADMIN — ENOXAPARIN SODIUM 40 MG: 40 INJECTION SUBCUTANEOUS at 13:22

## 2021-10-31 NOTE — PROGRESS NOTES
Problem: Falls - Risk of  Goal: *Absence of Falls  Description: Document [place] Flow Fall Risk and appropriate interventions in the flowsheet.   Outcome: Progressing Towards Goal  Note: Fall Risk Interventions:                                Problem: Patient Education: Go to Patient Education Activity  Goal: Patient/Family Education  Outcome: Progressing Towards Goal

## 2021-10-31 NOTE — H&P
History and Physical           History and Physical    Patient: Obdulio Cutler MRN: [id number]  SSN: [id number]    YOB: 1940  Age: [de-identified] y.o. Sex: female      Subjective:      Obdulio Cutler is a [de-identified] y.o. female with a history of diverticulitis s/p partial resection and thus shortened bowel , GERD, hyperlipidemia, hypertension, neuropathy, prior ovarian cancer s/p resection who presents with complaints of nausea and vomiting for several days, facial flushing, dull headache in the back of her head/neck for 1 or 2 days. She was diagnosed a few days ago with gastritis by PCP. She reports a return of her diarrhea overnight and into the AM; her nausea has returned and she reports vomiting twice in the ED. Denies blood in stool or vomit. Her symptomology is fairly vague. In the ED:   98.4      77      175/55     16     95%  CBC: WNL  CMP: notable for Na+: 125 and Cr 1.04 which is up from 0.80 in August.  U/A: small Lueks; wbc 10-20    She was admitted by the ED for Hyponatremia. By the time of interview: Na+  Corrected to 133. Fluids have been stopped. She denies any urinary symptoms of any kind: no frequency, burning, urgency. She denies F/C, CP, SOB, cough, abdominal pain. 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     Postmenopausal vaginal bleeding 9/19/2013    Due to Granulation tissue from her pessary, treated w/ silver nitrate stick on 9.19.2013    Presence of pessary     #2    PARUL (stress urinary incontinence, female)      Past Surgical History:   Procedure Laterality Date    HX APPENDECTOMY      HX DILATION AND CURETTAGE  1963    colonization    HX SALPINGO-OOPHORECTOMY  7/03    Exploratory Lap, omentectomy, tumor debulking Alycia Lucio)    HX TONSILLECTOMY  1964    HX TOTAL COLECTOMY      Neena Posey)      Family History   Problem Relation Age of Onset    Stroke Mother     Heart Disease Father      Social History     Tobacco Use    Smoking status: Former Smoker    Smokeless tobacco: Never Used   Substance Use Topics    Alcohol use: No     Alcohol/week: 0.0 standard drinks     Comment: minimal      Prior to Admission medications    Medication Sig Start Date End Date Taking? Authorizing Provider   cholestyramine-sucrose 4 gram powder MIX 1 SCOOP (4 GRAMS TOTAL) WITH LIQUID AND DRINK TWICE DAILY 10/18/21  Yes Jenna Belcher, MATT   zolpidem (AMBIEN) 5 mg tablet TAKE 1 TABLET BY MOUTH NIGHTLY AS NEEDED FOR SLEEP. MAX DAILY AMOUNT 1 TABLET 8/27/21  Yes Jenna Belcher NP   losartan-hydroCHLOROthiazide (HYZAAR) 100-12.5 mg per tablet Take 1 tablet by mouth once daily 8/27/21  Yes Jenna Belcher NP   spironolactone (ALDACTONE) 25 mg tablet TAKE 1/2 TABLET BY MOUTH DAILY. 3/30/21  Yes Jenna Belcher NP   metoprolol succinate (TOPROL-XL) 50 mg XL tablet Take 1 Tab by mouth daily. 2/19/21  Yes Jenna Belcher NP   amLODIPine (NORVASC) 10 mg tablet TAKE 1 TABLET BY MOUTH  DAILY 2/19/21  Yes Jenna Belcher NP   acetaminophen (TylenoL) 325 mg tablet Take 650 mg by mouth every four (4) hours as needed for Pain. Yes Andrew, MD Pete   triamcinolone acetonide (KENALOG) 0.1 % topical cream Apply  to affected area two (2) times a day. use thin layer to itchy skin as needed  Patient not taking: Reported on 10/31/2021 5/11/21   Ellen Garvey MD   rosuvastatin (CRESTOR) 5 mg tablet Take 1 Tab by mouth nightly.  Indications: excessive fat in the blood 2/19/21   Levi Belcher, NP   omeprazole (PRILOSEC) 20 mg capsule TAKE 1 CAPSULE BY MOUTH TWO TIMES DAILY INDICATIONS:  PREVENTION OF NSAID-INDUCED GASTRIC ULCER 2/17/20   Levi Belcher NP   ALPRAZolam Cassandra Lizzy) 0.5 mg tablet Take 1 Tab by mouth two (2) times daily as needed for Anxiety. Max Daily Amount: 1 mg. 2/17/20   Alissa Singletary NP        Allergies   Allergen Reactions    Feldene [Piroxicam] Other (comments)     Stomach problem    Toradol [Ketorolac Tromethamine] Itching    Voltaren [Diclofenac Sodium] Diarrhea       Review of Systems:  See HPI    Objective:     Vitals:    10/31/21 0140 10/31/21 0335 10/31/21 0351 10/31/21 0908   BP: (!) 153/58 (!) 161/63 (!) 160/68 (!) 163/71   Pulse: 75 78 68 71   Resp: 16 18 18 20   Temp:   98.9 °F (37.2 °C) 98.5 °F (36.9 °C)   SpO2: 97% 98% 100% 100%   Weight:       Height:            Physical Exam:  Constitutional:       General: She is distress from headache     Appearance: She is ill-appearing from pain, but she is not toxic-appearing. Comments: Uncomfortable, weak appearing   HENT:      Head: Normocephalic and atraumatic. No nuchal rigidity; can touch chin to chest without any distress; neck-into-head: not tender, no erythema. Eyes:      Extraocular Movements: Extraocular movements intact. Conjunctiva/sclera: Conjunctivae normal.      Pupils: Pupils are equal, round, and reactive to light. Cardiovascular:      Rate and Rhythm: Normal rate and regular rhythm. Heart sounds: Normal heart sounds. No murmur heard. No friction rub. No gallop. Pulmonary:      Effort: Pulmonary effort is normal. No respiratory distress. Breath sounds: Normal breath sounds. No stridor. No wheezing or rales. Chest:      Chest wall: No tenderness. Abdominal:      General: There is no distension. Palpations: Abdomen is soft. Tenderness: There is no abdominal tenderness.  There is no right CVA tenderness, left CVA tenderness, guarding or rebound. Musculoskeletal:         General: No swelling, tenderness, deformity or signs of injury. Normal range of motion. Cervical back: Normal range of motion and neck supple. No muscular tenderness. Right lower leg: No edema. Left lower leg: No edema. Lymphadenopathy:      Cervical: No cervical adenopathy. Skin:     General: Skin is warm and dry. Coloration: Skin is not jaundiced or pale. Findings: No rash. Neurological:      General: No focal deficit present. Mental Status: She is alert and oriented to person, place, and time. Cranial Nerves: No cranial nerve deficit. Sensory: No sensory deficit. Motor: No weakness. Coordination: Coordination normal.      Gait: Gait normal.      Comments: Weak diffusely   Psychiatric:         Mood and Affect: Mood normal.         Behavior: Behavior normal.         Thought Content: Thought content normal.         Judgment: Judgment normal.     Assessment:     Hospital Problems  Date Reviewed: 10/25/2021        Codes Class Noted POA    Hyponatremia ICD-10-CM: E87.1  ICD-9-CM: 276.1  10/31/2021 Unknown          [de-identified] yo with shortened bowel and prior episodes of gastritis/diarrhea present with reports of N/V/D but a chief complaint of head/neck pain and was admitted for Hyponatremia (125 now 133). She has asymptomatic pyuria. Plan:   1.) Hyponatremia (resolved): likely hypovolemic in the setting of poor PO intake and vomiting and diarrhea. - responded/resolved with IVF. - IVF now stopped to avoid any over-correction.    [ ] will hold HCTZ and will not DC her on it    2.) N/V/D: may be the gastritis her PCP proposed  - no indication for Abx at this time  - no fever or WBCs; does not appear infectious  - will hold off on Imodium at the moment    3.) Headache: exceedingly vague; not associated with any neuro features  - no nuchal rigidity; no photophobia  - no focal weakness  - Fioricet for pain    4.) Chronic issues:  - HTN: home amlodipone; metoprolol, losartan; HOLD HCTZ in hyponatremia  - shortened bowel: cholestyramine    DVT PPx: Lovenox, with caution as she has remote Hx of post-menopausal vaginal bleeding  Diet: Clears; advance a tolerated  Dispo: now that Na+ has corrected, can go home when tolerating food  Signed By: Nell Kong MD     October 31, 2021

## 2021-10-31 NOTE — ED NOTES
Report given to [facility], all questions answered. Patient transported to , condition unchanged. NAD noted when leaving department.

## 2021-10-31 NOTE — PROGRESS NOTES
9015 Report received from [facility] in ED,all concerns addressed. 0340 Pt arrived to unit via wheelchair with ED Nurse, pt ambulated safely to bed w/o assistance,VS completed,  admission screening questions completed, pt oriented to room and call bell use. TRANSFER - IN REPORT:    Verbal report received from  [facility] (name) on Kandy Thomas  being received from ED (unit) for routine progression of care      Report consisted of patients Situation, Background, Assessment and   Recommendations(SBAR). Information from the following report(s) SBAR, Kardex, ED Summary, [facility] and Recent Results was reviewed with the receiving nurse. Opportunity for questions and clarification was provided. Assessment completed upon patients arrival to unit and care assumed.

## 2021-10-31 NOTE — ED PROVIDER NOTES
Patient is a 29-year-old female with a history of cat scratch fever, diverticulitis, GERD, hyperlipidemia, hypertension, neuropathy, ovarian cancer who presents with complaints of nausea and vomiting for several days, facial flushing, headache for 1 or 2 days. Headache is dull pain starting at her occiput and radiating down into her upper back. She states is similar to headache she had when she was diagnosed with ovarian cancer in 2003. She has no significant abdominal pain. She did have diarrhea up until about 2 days ago. Patient states that she feels weak with any exertion. She denies orthopnea GARCIA or PND. She has had no chest pain heaviness pressure neck arm or jaw pain. No syncope or near syncope. She is continually nauseated and can not keep food down she states. No blood in her stools been noted. No dysuria urgency or frequency. No joint pain or focal joint weakness is noted. Patient initially felt little improved after office visit on October 25 but is gotten worse over the last 2 to 3 days. Patient was seen at her primary care office on October 25 for complaints of feeling ill since October 22. .  She has been having diarrhea, belching, neck pain, \"sweats\" and fatigue,denies current abdominal pain, Covid test was negative that day, diagnosis was gastroenteritis. ..             Past Medical History:   Diagnosis Date    Arthritis     [facility]     ov[facility]    [facility]omatosis ([facility] 07/03    Cat scratch fever 09/91    Cervical prolapse 2011    Grade II    Chicken pox 1962    Contact dermatitis and other eczema, due to unspecified cause     Cystocele 2011    grade I    Diverticulosis     1985    Duodenal ulcer     Eye disorder 06/95    recurrent erosion of L eye    GERD (gastroesophageal reflux disease)     [facility] measles 1964    Hypercholesterolemia     Hypertension     Menarche     onset at age 16    Menopause     onset at age 47    Neuropathy     Ovarian cancer ([facility] 07/03 Poorly diff.  Postmenopausal vaginal bleeding 9/19/2013    Due to Granulation tissue from her pessary, treated w/ silver nitrate stick on 9.19.2013    Presence of pessary     #2    PARUL (stress urinary incontinence, female)        Past Surgical History:   Procedure Laterality Date    HX APPENDECTOMY      HX DILATION AND CURETTAGE  1963    colonization    HX SALPINGO-OOPHORECTOMY  7/03    Exploratory Lap, omentectomy, tumor debulking Domenic Mccormick)    HX TONSILLECTOMY  1964    HX TOTAL COLECTOMY      Bravo Daniel)         Family History:   Problem Relation Age of Onset    Stroke Mother     Heart Disease Father        Social History     Socioeconomic History    Marital status: SINGLE     Spouse name: Not on file    Number of children: 0    Years of education: Not on file    Highest education level: Not on file   Occupational History    Occupation: front office, coding     Employer: RETIRED     Comment: Dr. Anai Victor, \[facility] [place] [facility]\""   Tobacco Use    Smoking status: Former Smoker    Smokeless tobacco: Never Used   Vaping Use    Vaping Use: Never used   Substance and Sexual Activity    Alcohol use: No     Alcohol/week: 0.0 standard drinks     Comment: minimal    Drug use: No    Sexual activity: Not Currently   Other Topics Concern     Service No    Blood Transfusions No    Caffeine Concern No    Occupational Exposure No    Hobby Hazards No    Sleep Concern No    Stress Concern No    Weight Concern No    Special Diet No    Back Care No    Exercise No    Bike Helmet No    Seat Belt Yes    Self-Exams No   Social History Narrative    Not on file     Social Determinants of Health     Financial Resource Strain:     Difficulty of Paying Living Expenses:    Food Insecurity:     Worried About Running Out of Food in the Last Year:     Ran Out of Food in the Last Year:    Transportation Needs:     Lack of Transportation (Medical):      Lack of Transportation (Non-Medical):    Physical Activity:     Days of Exercise per Week:     Minutes of Exercise per Session:    Stress:     Feeling of Stress :    Social Connections:     Frequency of Communication with Friends and Family:     Frequency of Social Gatherings with Friends and Family:     Attends Mu-ism Services:     Active Member of Clubs or Organizations:     Attends Club or Organization Meetings:     Marital Status:    Intimate Partner Violence:     Fear of Current or Ex-Partner:     Emotionally Abused:     Physically Abused:     Sexually Abused: ALLERGIES: Feldene [piroxicam], Toradol [ketorolac tromethamine], and Voltaren [diclofenac sodium]    Review of Systems   All other systems reviewed and are negative. Vitals:    10/30/21 2205 10/31/21 0140 10/31/21 0335 10/31/21 0351   BP: (!) 175/55 (!) 153/58 (!) 161/63 (!) 160/68   Pulse: 77 75 78 68   Resp: 16 16 18 18   Temp: 98.4 °F (36.9 °C)   98.9 °F (37.2 °C)   SpO2: 99% 97% 98% 100%   Weight: 63.5 kg (140 lb)      Height: 5' 3\" (1.6 m)               Physical Exam  Vitals and nursing note reviewed. Constitutional:       General: She is not in acute distress. Appearance: She is ill-appearing. She is not toxic-appearing. Comments: Uncomfortable, weak appearing   HENT:      Head: Normocephalic and atraumatic. Right Ear: External ear normal.      Left Ear: External ear normal.      Nose: Nose normal.      Mouth/Throat:      Mouth: Mucous membranes are moist.      Pharynx: No oropharyngeal exudate or posterior oropharyngeal erythema. Eyes:      Extraocular Movements: Extraocular movements intact. Conjunctiva/sclera: Conjunctivae normal.      Pupils: Pupils are equal, round, and reactive to light. Cardiovascular:      Rate and Rhythm: Normal rate and regular rhythm. Heart sounds: Normal heart sounds. No murmur heard. No friction rub. No gallop. Pulmonary:      Effort: Pulmonary effort is normal. No respiratory distress.       Breath sounds: Normal breath sounds. No stridor. No wheezing or rales. Chest:      Chest wall: No tenderness. Abdominal:      General: There is no distension. Palpations: Abdomen is soft. Tenderness: There is no abdominal tenderness. There is no right CVA tenderness, left CVA tenderness, guarding or rebound. Musculoskeletal:         General: No swelling, tenderness, deformity or signs of injury. Normal range of motion. Cervical back: Normal range of motion and neck supple. No muscular tenderness. Right lower leg: No edema. Left lower leg: No edema. Lymphadenopathy:      Cervical: No cervical adenopathy. Skin:     General: Skin is warm and dry. Capillary Refill: Capillary refill takes less than 2 seconds. Coloration: Skin is not jaundiced or pale. Findings: No rash. Neurological:      General: No focal deficit present. Mental Status: She is alert and oriented to person, place, and time. Cranial Nerves: No cranial nerve deficit. Sensory: No sensory deficit. Motor: No weakness. Coordination: Coordination normal.      Gait: Gait normal.      Comments: Weak diffusely   Psychiatric:         Mood and Affect: Mood normal.         Behavior: Behavior normal.         Thought Content:  Thought content normal.         Judgment: Judgment normal.        Labs -  Recent Results (from the past 24 hour(s))   CBC WITH AUTOMATED DIFF    Collection Time: 10/30/21 10:15 PM   Result Value Ref Range    WBC 9.5 3.6 - 11.0 K/uL    RBC 4.31 3.80 - 5.20 M/uL    HGB 12.4 11.5 - 16.0 g/dL    HCT 35.9 35.0 - 47.0 %    MCV 83.3 80.0 - 99.0 FL    MCH 28.8 26.0 - 34.0 PG    MCHC 34.5 30.0 - 36.5 g/dL    RDW 13.3 11.5 - 14.5 %    PLATELET 571 203 - 709 K/uL    MPV 9.2 8.9 - 12.9 FL    NRBC 0.0 0  WBC    ABSOLUTE NRBC 0.00 0.00 - 0.01 K/uL    NEUTROPHILS 58 32 - 75 %    LYMPHOCYTES 31 12 - 49 %    MONOCYTES 10 5 - 13 %    EOSINOPHILS 1 0 - 7 %    BASOPHILS 0 0 - 1 %    IMMATURE GRANULOCYTES 0 0.0 - 0.5 %    ABS. NEUTROPHILS 5.5 1.8 - 8.0 K/UL    ABS. LYMPHOCYTES 3.0 0.8 - 3.5 K/UL    ABS. MONOCYTES 0.9 0.0 - 1.0 K/UL    ABS. EOSINOPHILS 0.1 0.0 - 0.4 K/UL    ABS. BASOPHILS 0.0 0.0 - 0.1 K/UL    ABS. IMM. GRANS. 0.0 0.00 - 0.04 K/UL    DF AUTOMATED     METABOLIC PANEL, COMPREHENSIVE    Collection Time: 10/30/21 10:15 PM   Result Value Ref Range    Sodium 125 (L) 136 - 145 mmol/L    Potassium 4.3 3.5 - 5.1 mmol/L    Chloride 90 (L) 97 - 108 mmol/L    CO2 25 21 - 32 mmol/L    Anion gap 10 5 - 15 mmol/L    Glucose 137 (H) 65 - 100 mg/dL    BUN 14 6 - 20 MG/DL    Creatinine 1.04 (H) 0.55 - 1.02 MG/DL    BUN/Creatinine ratio 13 12 - 20      GFR est AA >60 >60 ml/min/1.73m2    GFR est non-AA 51 (L) >60 ml/min/1.73m2    Calcium 8.6 8.5 - 10.1 MG/DL    Bilirubin, total 0.4 0.2 - 1.0 MG/DL    ALT (SGPT) 35 12 - 78 U/L    AST (SGOT) 23 15 - 37 U/L    Alk.  phosphatase 92 45 - 117 U/L    Protein, total 7.0 6.4 - 8.2 g/dL    Albumin 3.9 3.5 - 5.0 g/dL    Globulin 3.1 2.0 - 4.0 g/dL    A-G Ratio 1.3 1.1 - 2.2     MAGNESIUM    Collection Time: 10/30/21 10:15 PM   Result Value Ref Range    Magnesium 1.8 1.6 - 2.4 mg/dL   TROPONIN-HIGH SENSITIVITY    Collection Time: 10/30/21 10:15 PM   Result Value Ref Range    Troponin-High Sensitivity 10 0 - 51 ng/L   EKG, 12 LEAD, INITIAL    Collection Time: 10/30/21 10:26 PM   Result Value Ref Range    Ventricular Rate 71 BPM    Atrial Rate 71 BPM    P-R Interval 190 ms    QRS Duration 92 ms    Q-T Interval 414 ms    QTC Calculation (Bezet) 449 ms    Calculated P Axis 58 degrees    Calculated R Axis -16 degrees    Calculated T Axis 46 degrees    Diagnosis       Normal sinus rhythm  Normal ECG  When compared with ECG of 04-AUG-2020 23:33,  No significant change was found     URINALYSIS W/ RFLX MICROSCOPIC    Collection Time: 10/30/21 10:30 PM   Result Value Ref Range    Color YELLOW/STRAW      Appearance CLEAR CLEAR      Specific gravity 1.010 1.003 - 1.030      pH (UA) 5.5 5.0 - 8.0 Protein Negative NEG mg/dL    Glucose Negative NEG mg/dL    Ketone Negative NEG mg/dL    Bilirubin Negative NEG      Blood SMALL (A) NEG      Urobilinogen 0.2 0.2 - 1.0 EU/dL    Nitrites Negative NEG      Leukocyte Esterase SMALL (A) NEG     URINE MICROSCOPIC ONLY    Collection Time: 10/30/21 10:30 PM   Result Value Ref Range    WBC 10-20 0 - 4 /hpf    RBC 10-20 0 - 5 /hpf    Epithelial cells FEW FEW /lpf    Bacteria Negative NEG /hpf     Radiologic Studies -   CT Results  (Last 48 hours)               10/31/21 0040  CT ABD PELV W CONT Final result    Impression:  No acute process. Narrative:  CT ABDOMEN AND PELVIS WITH CONTRAST. 10/31/2021 12:40 AM        INDICATION: Ovarian carcinoma, nausea. COMPARISON: 8/20/2015. TECHNIQUE: CT of the abdomen and pelvis was performed after administration of   100 cc IV Isovue-300. CT dose reduction was achieved through use of a   standardized protocol tailored for this examination and automatic exposure   control for dose modulation. FINDINGS:   Abdomen: The lungs are clear. The heart size is normal. There is a large   gallstone. The distal esophagus, stomach, duodenum, liver, gallbladder,   pancreas, spleen, adrenals, and kidneys are otherwise normal.       Pelvis: Colonic diverticulosis is mild to moderate. A vaginal pessary is in   place. The small bowel, ileocecal junction, and bladder are normal. No free air   or fluid, and no abdominopelvic lymphadenopathy. There are surgical clips   scattered in the anterior left peritoneum. 10/31/21 0036  CT HEAD WO CONT Final result    Impression:  No acute intracranial abnormality. Narrative:  HEAD CT WITHOUT CONTRAST: 10/31/2021 12:36 AM       INDICATION: headache       COMPARISON: None. PROCEDURE: Axial images of the head were obtained without contrast. Coronal and   sagittal reformats were performed.  CT dose reduction was achieved through use of   a standardized protocol tailored for this examination and automatic exposure   control for dose modulation. FINDINGS: The ventricles and sulci are appropriate in size and configuration for   age. No loss of gray-white differentiation to suggest late acute or early   subacute infarction. No mass effect or intracranial hemorrhage. 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 XR Results (most recent):  Results from Hospital Encounter encounter on 08/04/20    XR CHEST PA LAT    Narrative  Clinical history: fever  INDICATION:   fever  COMPARISON: 6/11/2018    FINDINGS:  PA and lateral views of the chest are obtained. The cardiopericardial silhouette is within normal limits. There is no pleural  effusion, pneumothorax or focal consolidation present. Impression  IMPRESSION: No acute intrathoracic disease. MDM  Number of Diagnoses or Management Options  Hyponatremia: new and requires workup     Amount and/or Complexity of Data Reviewed  Clinical lab tests: ordered and reviewed  Tests in the radiology section of CPT®: ordered and reviewed  Tests in the medicine section of CPT®: ordered and reviewed  Decide to obtain previous medical records or to obtain history from someone other than the patient: yes    Risk of Complications, Morbidity, and/or Mortality  Presenting problems: moderate  Diagnostic procedures: high  Management options: moderate  General comments: Differential includes metabolic abnormality, electrolyte abnormality, intracranial lesion or mass from metastatic ovarian cancer, abdominal pain/nausea from recurrent cancer, urinary tract infection, gastroenteritis,    Patient Progress  Patient progress: stable    ED Course as of Oct 31 2112   Sun Oct 31, 2021   0805 Urinalysis is small amount of blood and white cells in it, 10-20 of each. Few epithelial cells noted.   Troponin is negative, magnesium is normal, sodium is low at 125, other electrolytes are unremarkable with exception of a sugar of 137 and a creatinine 1.04. CBC is unremarkable. I feel patient has a symptomatic hyponatremia with nausea vomiting and diffuse weakness and headache. I feel that she will benefit from IV hydration and normalization of her sodium. [PS]   [id number] EKG demonstrates normal sinus rhythm with a rate of 71, normal OR interval of 190, normal QRS of 92, normal QTC of 450, no acute process is noted.     [PS]      ED Course User Index  [PS] Harper Bates MD       Procedures    Orders Placed This Encounter    CT HEAD WO CONT    CT ABD PELV W CONT    CBC WITH AUTOMATED DIFF    CMP    MAGNESIUM    URINALYSIS W/ RFLX MICROSCOPIC    TROPONIN-HIGH SENSITIVITY    URINE MICROSCOPIC ONLY    CBC W/O DIFF    METABOLIC PANEL, COMPREHENSIVE    MAGNESIUM    TSH 3RD GENERATION    OSMOLALITY, SERUM/PLASMA    ADULT DIET Clear Liquid    BEDREST W/ BEDSIDE COMMODE    VITAL SIGNS PER UNIT ROUTINE    FULL CODE    EKG, 12 LEAD, INITIAL    SALINE LOCK IV ONE TIME STAT    sodium chloride (NS) flush 5-10 mL    ondansetron (ZOFRAN) injection 8 mg    iopamidoL (ISOVUE 300) 61 % contrast injection 100 mL    sodium chloride 0.9 % bolus infusion 1,000 mL    acetaminophen (TYLENOL) tablet 1,000 mg    acetaminophen (TYLENOL) tablet 650 mg    ondansetron (ZOFRAN) injection 4 mg    0.9% sodium chloride infusion    ketorolac (TORADOL) injection 15 mg    acetaminophen (TYLENOL) tablet 650 mg    ALPRAZolam (XANAX) tablet 0.5 mg    amLODIPine (NORVASC) tablet 10 mg    cholestyramine-aspartame (QUESTRAN LIGHT) packet 4 g    metoprolol succinate (TOPROL-XL) XL tablet 50 mg    atorvastatin (LIPITOR) tablet 10 mg    zolpidem (AMBIEN) tablet 5 mg    losartan (COZAAR) tablet 100 mg    IP CONSULT TO HOSPITALIST    INITIAL PHYSICIAN ORDER: OBSERVATION/OUTPATIENT IN A BED OBSERVATION; Medical; No; iv fluids       Medications   sodium chloride (NS) flush 5-10 mL (has no administration in time range)   ondansetron (ZOFRAN) injection 8 mg (8 mg IntraVENous Given 10/30/21 2320)   acetaminophen (TYLENOL) tablet 650 mg (has no administration in time range)   ondansetron (ZOFRAN) injection 4 mg (has no administration in time range)   0.9% sodium chloride infusion (100 mL/hr IntraVENous New Bag 10/31/21 0800)   ketorolac (TORADOL) injection 15 mg (15 mg IntraVENous Missed 10/31/21 0246)   acetaminophen (TYLENOL) tablet 650 mg (has no administration in time range)   ALPRAZolam (XANAX) tablet 0.5 mg (has no administration in time range)   amLODIPine (NORVASC) tablet 10 mg (10 mg Oral Given 10/31/21 0806)   cholestyramine-aspartame (QUESTRAN LIGHT) packet 4 g (has no administration in time range)   metoprolol succinate (TOPROL-XL) XL tablet 50 mg (50 mg Oral Given 10/31/21 0806)   atorvastatin (LIPITOR) tablet 10 mg (has no administration in time range)   zolpidem (AMBIEN) tablet 5 mg (has no administration in time range)   losartan (COZAAR) tablet 100 mg (100 mg Oral Given 10/31/21 0806)   iopamidoL (ISOVUE 300) 61 % contrast injection 100 mL (100 mL IntraVENous Given 10/31/21 0037)   sodium chloride 0.9 % bolus infusion 1,000 mL (0 mL IntraVENous IV Completed 10/31/21 0154)   acetaminophen (TYLENOL) tablet 1,000 mg (1,000 mg Oral Given 10/31/21 0143)       Patient Vitals for the past 8 hrs:   Temp Pulse Resp BP SpO2   10/31/21 0351 98.9 °F (37.2 °C) 68 18 (!) 160/68 100 %   10/31/21 0335  78 18 (!) 161/63 98 %   10/31/21 0140  75 16 (!) 153/58 97 %         DIAGNOSIS:      ICD-10-CM ICD-9-CM    1. Hyponatremia  E87.1 276.1         I have reviewed all pertinent and currently available diagnostic test results for this visit including, but not limited to, labs, xrays, and EKGs. I have reviewed all pertinent and currently available medical records, past medical history, social history and family history.  My plan of care and further evaluation and/or disposition is based on these results, as well as the initial, and subsequent, history and physical exam, as well as any additional complaints during the visit. Laboratory tests, medications, x-rays, diagnosis, and need for admission or transfer have been reviewed and discussed with the patient and/or family. Pt and/or family have had the opportunity to ask questions about their care. Patient and/or family verbalized understanding and agreement with care plan. After review of patient's ED evaluation I feel patient will benefit from admission to hospital due to need for IV fluids and rehabilitation of her sodium status, further evaluation is indicated. Bairon Evans MD    Please note that this dictation was completed with [employer], the computer voice recognition software. Quite often unanticipated grammatical, syntax, homophones, and other interpretive errors are inadvertently transcribed by the computer software. Please disregard these errors. Please excuse any errors that have escaped final proofreading.

## 2021-10-31 NOTE — ROUTINE PROCESS
Bedside shift change report given to Sebastián  (oncoming nurse) by Alycia Parham  (offgoing nurse). Report included the following information SBAR, Kardex, Intake/Output, MAR and Recent Results.

## 2021-11-01 VITALS
TEMPERATURE: 98.8 F | DIASTOLIC BLOOD PRESSURE: 72 MMHG | OXYGEN SATURATION: 98 % | HEART RATE: 82 BPM | RESPIRATION RATE: 20 BRPM | BODY MASS INDEX: 24.8 KG/M2 | WEIGHT: 140 LBS | HEIGHT: 63 IN | SYSTOLIC BLOOD PRESSURE: 136 MMHG

## 2021-11-01 LAB
ALBUMIN SERPL-MCNC: 2.8 G/DL (ref 3.5–5)
ALBUMIN/GLOB SERPL: 0.9 {RATIO} (ref 1.1–2.2)
ALP SERPL-CCNC: 72 U/L (ref 45–117)
ALT SERPL-CCNC: 27 U/L (ref 12–78)
ANION GAP SERPL CALC-SCNC: 9 MMOL/L (ref 5–15)
AST SERPL-CCNC: 19 U/L (ref 15–37)
BILIRUB SERPL-MCNC: 0.2 MG/DL (ref 0.2–1)
BUN SERPL-MCNC: 12 MG/DL (ref 6–20)
BUN/CREAT SERPL: 13 (ref 12–20)
CALCIUM SERPL-MCNC: 8.6 MG/DL (ref 8.5–10.1)
CHLORIDE SERPL-SCNC: 100 MMOL/L (ref 97–108)
CO2 SERPL-SCNC: 24 MMOL/L (ref 21–32)
COMMENT, HOLDF: NORMAL
CREAT SERPL-MCNC: 0.94 MG/DL (ref 0.55–1.02)
ERYTHROCYTE [DISTWIDTH] IN BLOOD BY AUTOMATED COUNT: 14 % (ref 11.5–14.5)
GLOBULIN SER CALC-MCNC: 3.2 G/DL (ref 2–4)
GLUCOSE SERPL-MCNC: 95 MG/DL (ref 65–100)
HCT VFR BLD AUTO: 32.5 % (ref 35–47)
HGB BLD-MCNC: 11 G/DL (ref 11.5–16)
MAGNESIUM SERPL-MCNC: 2 MG/DL (ref 1.6–2.4)
MCH RBC QN AUTO: 28.4 PG (ref 26–34)
MCHC RBC AUTO-ENTMCNC: 33.8 G/DL (ref 30–36.5)
MCV RBC AUTO: 83.8 FL (ref 80–99)
NRBC # BLD: 0 K/UL (ref 0–0.01)
NRBC BLD-RTO: 0 PER 100 WBC
PLATELET # BLD AUTO: 267 K/UL (ref 150–400)
PMV BLD AUTO: 9.4 FL (ref 8.9–12.9)
POTASSIUM SERPL-SCNC: 4.6 MMOL/L (ref 3.5–5.1)
PROT SERPL-MCNC: 6 G/DL (ref 6.4–8.2)
RBC # BLD AUTO: 3.88 M/UL (ref 3.8–5.2)
SAMPLES BEING HELD,HOLD: NORMAL
SODIUM SERPL-SCNC: 133 MMOL/L (ref 136–145)
WBC # BLD AUTO: 7.3 K/UL (ref 3.6–11)

## 2021-11-01 PROCEDURE — 83735 ASSAY OF MAGNESIUM: CPT

## 2021-11-01 PROCEDURE — 97161 PT EVAL LOW COMPLEX 20 MIN: CPT

## 2021-11-01 PROCEDURE — 74011250637 HC RX REV CODE- 250/637: Performed by: INTERNAL MEDICINE

## 2021-11-01 PROCEDURE — 99218 HC RM OBSERVATION: CPT

## 2021-11-01 PROCEDURE — 85027 COMPLETE CBC AUTOMATED: CPT

## 2021-11-01 PROCEDURE — 36415 COLL VENOUS BLD VENIPUNCTURE: CPT

## 2021-11-01 PROCEDURE — 74011250637 HC RX REV CODE- 250/637: Performed by: STUDENT IN AN ORGANIZED HEALTH CARE EDUCATION/TRAINING PROGRAM

## 2021-11-01 PROCEDURE — 80053 COMPREHEN METABOLIC PANEL: CPT

## 2021-11-01 RX ORDER — LOSARTAN POTASSIUM 100 MG/1
100 TABLET ORAL DAILY
Qty: 30 TABLET | Refills: 1 | Status: SHIPPED | OUTPATIENT
Start: 2021-11-02 | End: 2021-11-01 | Stop reason: SDUPTHER

## 2021-11-01 RX ORDER — LOSARTAN POTASSIUM 100 MG/1
100 TABLET ORAL DAILY
Qty: 30 TABLET | Refills: 1 | Status: SHIPPED | OUTPATIENT
Start: 2021-11-02 | End: 2021-11-08 | Stop reason: SDUPTHER

## 2021-11-01 RX ORDER — NIFEDIPINE 30 MG/1
60 TABLET, EXTENDED RELEASE ORAL DAILY
Status: DISCONTINUED | OUTPATIENT
Start: 2021-11-02 | End: 2021-11-01 | Stop reason: HOSPADM

## 2021-11-01 RX ORDER — ACETAMINOPHEN 325 MG/1
650 TABLET ORAL ONCE
Status: COMPLETED | OUTPATIENT
Start: 2021-11-01 | End: 2021-11-01

## 2021-11-01 RX ORDER — NIFEDIPINE 30 MG/1
30 TABLET, EXTENDED RELEASE ORAL ONCE
Status: COMPLETED | OUTPATIENT
Start: 2021-11-01 | End: 2021-11-01

## 2021-11-01 RX ORDER — NIFEDIPINE 60 MG/1
60 TABLET, EXTENDED RELEASE ORAL DAILY
Qty: 30 TABLET | Refills: 1 | Status: SHIPPED | OUTPATIENT
Start: 2021-11-02 | End: 2021-11-01 | Stop reason: SDUPTHER

## 2021-11-01 RX ORDER — NIFEDIPINE 60 MG/1
60 TABLET, EXTENDED RELEASE ORAL DAILY
Qty: 30 TABLET | Refills: 1 | Status: SHIPPED | OUTPATIENT
Start: 2021-11-02 | End: 2021-11-08 | Stop reason: SDUPTHER

## 2021-11-01 RX ADMIN — BUTALBITAL, ACETAMINOPHEN, AND CAFFEINE 1 TABLET: 50; 325; 40 TABLET ORAL at 15:46

## 2021-11-01 RX ADMIN — METOPROLOL SUCCINATE 50 MG: 50 TABLET, EXTENDED RELEASE ORAL at 08:19

## 2021-11-01 RX ADMIN — CHOLESTYRAMINE 4 G: 4 POWDER, FOR SUSPENSION ORAL at 08:19

## 2021-11-01 RX ADMIN — ACETAMINOPHEN 650 MG: 325 TABLET ORAL at 09:57

## 2021-11-01 RX ADMIN — NIFEDIPINE 30 MG: 30 TABLET, FILM COATED, EXTENDED RELEASE ORAL at 10:22

## 2021-11-01 RX ADMIN — LOSARTAN POTASSIUM 100 MG: 100 TABLET, FILM COATED ORAL at 08:19

## 2021-11-01 RX ADMIN — AMLODIPINE BESYLATE 10 MG: 10 TABLET ORAL at 08:19

## 2021-11-01 RX ADMIN — CHOLESTYRAMINE 4 G: 4 POWDER, FOR SUSPENSION ORAL at 16:56

## 2021-11-01 NOTE — PROGRESS NOTES
PHYSICAL THERAPY EVALUATION/DISCHARGE  Patient: Amilcar Young (1 y.o. female)  Date: 11/1/2021  Primary Diagnosis: Hyponatremia [E87.1]              ASSESSMENT  Pt. Received sitting edge of bed and agreeable to PT. Pt. Is currently at functional baseline and [facility] for strength and ambulation. Pt. Did not report any dizziness with ambulation or demonstrate any deviations outside of baseline. Pt. To be signed off of PT d/t being at functional baseline and will be managed medically by MD and RN. Pt. Would benefit from HHPT for endurance training and exercises in the home. Other factors to consider for discharge: home set up     Further skilled acute physical therapy is not indicated at this time. PLAN :  Recommendation for discharge: (in order for the patient to meet his/her long term goals)  Physical therapy at least 2 days/week in the home     This discharge recommendation:  Has not yet been discussed the attending provider and/or case management    IF patient discharges home will need the following DME: patient owns DME required for discharge       SUBJECTIVE:   Patient stated Im feeling fine. 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     Postmenopausal vaginal bleeding 9/19/2013    Due to Granulation tissue from her pessary, treated w/ silver nitrate stick on 9.19.2013    Presence of pessary     #2    PARUL (stress urinary incontinence, female)      Past Surgical History:   Procedure Laterality Date    HX APPENDECTOMY      HX DILATION AND CURETTAGE  1963    colonization    HX SALPINGO-OOPHORECTOMY      Exploratory Lap, omentectomy, tumor debulking Alycia Lucio)    HX TONSILLECTOMY  1964    HX TOTAL COLECTOMY      Neena Posey)         Home Situation  Home Environment: Private residence  One/Two Story Residence: One story  Living Alone: Yes  Support Systems: Other Family Member(s)  Patient Expects to be Discharged to[de-identified] House  Current DME Used/Available at Home: None    EXAMINATION/PRESENTATION/DECISION MAKING:   Critical Behavior:  Hearing: Auditory  Auditory Impairment: Hard of hearing, bilateral  Strength:          RLE Assessment (WDL): Within defined limits        LLE Assessment (WDL): Within defined limits     Functional Mobility:  Transfers:  Sit to Stand: Independent  Stand to Sit: Independent        Bed to Chair: Independent  Balance:   Sitting: Intact  Standing: Intact  Ambulation/Gait Training:  Gait Description (WDL): Within defined limits     Physical Therapy Evaluation Charge Determination   History Examination Presentation Decision-Making   LOW Complexity : Zero comorbidities / personal factors that will impact the outcome / POC LOW Complexity : 1-2 Standardized tests and measures addressing body structure, function, activity limitation and / or participation in recreation  LOW Complexity : Stable, uncomplicated  LOW Complexity : FOTO score of       Based on the above components, the patient evaluation is determined to be of the following complexity level: LOW     Pain Ratin/10    Activity Tolerance:    WNL      After treatment patient left in no apparent distress:   Sitting in chair    COMMUNICATION/EDUCATION:   The patients plan of care was discussed with: Physical therapist.     Patient/family have participated as able in goal setting and plan of care.    Thank you for this referral.  Paresh Tate, PT, DPT, EP-C   Time Calculation: 18 mins

## 2021-11-01 NOTE — PROGRESS NOTES
Care Management Interventions  PCP Verified by CM: Yes Marie Claire NP, [facility])  Last Visit to PCP: 10/25/21  Palliative Care Criteria Met (RRAT>21 & CHF Dx)?: No  Mode of Transport at Discharge: Self (POV/cousin)  Transition of Care Consult (CM Consult): Discharge Planning  MyChart Signup: No  Discharge Durable Medical Equipment: No  Physical Therapy Consult: Yes  Occupational Therapy Consult: No  Speech Therapy Consult: No  Support Systems: Friend/Neighbor, Other Family Member(s)  Confirm Follow Up Transport: Self  [place] Resource Information Provided?: No  Discharge Location  Discharge Placement: Home     Ms. Gabi Sanders was admitted under Observation status 10/30/21 with hyponatremia. The State Observation and MOON notifications were explained, signed, received: yellow copy to patient, white copy to chart. Ms. Gabi Sanders lives alone but has relatives close by and neighbor support. She is still driving and does not use any DME. She does not want skilled home health services but would like some in home assistance with meals, some ADLs. She may have long term care insurance and I advised her to call her  about this. I gave her resources for personal care agencies and our private duty sitting list. She may be interested in meals on wheels through [employer]. I gave her the contact number to inquire about this. The care management introductory letter with contact information was given. Her cousin Mynor Hartman will be taking her home upon discharge. Advance Directive on file.     Reason for Admission:  Hyponatremia                     RUR Score:   N/A Observation        RRAT:   LOW              Plan for utilizing home health:    NO      PCP: First and Last name:  Susi Herbert NP     Name of Practice: [facility]   Are you a current patient: Yes/No: YES   Approximate date of last visit: 10/25/21   Can you participate in a virtual visit with your PCP: YES                    Current Advanced Directive/Advance Care Plan: Full Code      Healthcare Decision Maker:   Click here to complete [address] including selection of the Healthcare Decision Maker Relationship (ie \"Primary\")             Primary Decision Maker: Angle Sharma - Andrew Relative - [address] (ACP) Conversation      Date of Conversation: 11/01/21  Conducted with: Patient with Decision Making Capacity    Healthcare Decision Maker:     Primary Decision Maker: Angle Sharma - Andrew Relative - [phone]  Click here to complete [address] including selection of the Healthcare Decision Maker Relationship (ie \"Primary\")          Content/Action Overview:    Has ACP document(s) on file - reflects the patient's care preferences  Reviewed DNR/DNI and patient elects Full Code (Attempt Resuscitation)        Length of Voluntary ACP Conversation in minutes:  <16 minutes (Non-Billable)    Conchita Osuna                             Transition of Care Plan:  Home

## 2021-11-01 NOTE — PROGRESS NOTES
Problem: Falls - Risk of  Goal: *Absence of Falls  Description: Document Osiel Bland Fall Risk and appropriate interventions in the flowsheet.   Outcome: Progressing Towards Goal  Note: Fall Risk Interventions:            Medication Interventions: Patient to call before getting OOB, Teach patient to arise slowly                   Problem: Patient Education: Go to Patient Education Activity  Goal: Patient/Family Education  Outcome: Progressing Towards Goal     Problem: Hypertension  Goal: *Blood pressure within specified parameters  Outcome: Progressing Towards Goal  Goal: *Fluid volume balance  Outcome: Progressing Towards Goal  Goal: *Labs within defined limits  Outcome: Progressing Towards Goal     Problem: Patient Education: Go to Patient Education Activity  Goal: Patient/Family Education  Outcome: Progressing Towards Goal     Problem: Pain  Goal: *Control of Pain  Outcome: Progressing Towards Goal     Problem: Patient Education: Go to Patient Education Activity  Goal: Patient/Family Education  Outcome: Progressing Towards Goal

## 2021-11-01 NOTE — DISCHARGE INSTRUCTIONS
Patient Education        Hyponatremia: Care Instructions  Your Care Instructions     Hyponatremia (say \"ph-fo-oeh-TREE-erik-uh\") means that you don't have enough sodium in your blood. It can cause nausea, vomiting, and headaches. Or you may not feel hungry. In serious cases, it can cause seizures, a coma, or even death. Hyponatremia is not a disease. It is a problem caused by something else, such as medicines or exercising for a long time in hot weather. You can get hyponatremia if you lose a lot of fluids and then you drink a lot of water or other liquids that don't have much sodium. You can also get it if you have kidney, liver, heart, or other health problems. Treatment is focused on getting your sodium levels back to normal.  Follow-up care is a key part of your treatment and safety. Be sure to make and go to all appointments, and call your doctor if you are having problems. It's also a good idea to know your test results and keep a list of the medicines you take. How can you care for yourself at home? · If your doctor recommends it, drink fluids that have sodium. Sports drinks are a good choice. Or you can eat salty foods. · If your doctor recommends it, limit the amount of water you drink. And limit fluids that are mostly water. These include tea, coffee, and juice. · Take your medicines exactly as prescribed. Call your doctor if you have any problems with your medicine. · Get your sodium levels tested when your doctor tells you to. When should you call for help? Call 911 anytime you think you may need emergency care. For example, call if:    · You have a seizure.     · You passed out (lost consciousness).    Call your doctor now or seek immediate medical care if:    · You are confused or it is hard to focus.     · You have little or no appetite.     · You feel sick to your stomach or you vomit.     · You have a headache.     · You have mood changes.     · You feel more tired than usual.   Watch closely for changes in your health, and be sure to contact your doctor if:    · You do not get better as expected. Where can you learn more? Go to http://www.gray.com/  Enter U075 in the search box to learn more about \"Hyponatremia: Care Instructions. \"  Current as of: June 17, 2021               Content Version: 13.0  © 0594-6931 [employer]. Care instructions adapted under license by [employer] (which disclaims liability or warranty for this information). If you have questions about a medical condition or this instruction, always ask your healthcare professional. [place] [address] any warranty or liability for your use of this information. Follow-up with your doctor in about 1 week to check on your blood pressure and sodium levels. DISCHARGE SUMMARY from Nurse    PATIENT INSTRUCTIONS:    What to do at Home:  Recommended activity: Activity as tolerated,     If you experience any recurrent symptoms , please follow up with PCP or return to the ED. *  Please give a list of your current medications to your Primary Care Provider. *  Please update this list whenever your medications are discontinued, doses are      changed, or new medications (including over-the-counter products) are added. *  Please carry medication information at all times in case of emergency situations. These are general instructions for a healthy lifestyle:    No smoking/ No tobacco products/ Avoid exposure to second hand smoke  Surgeon General's Warning:  Quitting smoking now greatly reduces serious risk to your health.     Obesity, smoking, and sedentary lifestyle greatly increases your risk for illness    A healthy diet, regular physical exercise & weight monitoring are important for maintaining a healthy lifestyle    You may be retaining fluid if you have a history of heart failure or if you experience any of the following symptoms:  Weight gain of 3 pounds or more overnight or 5 pounds in a week, increased swelling in our hands or feet or shortness of breath while lying flat in bed. Please call your doctor as soon as you notice any of these symptoms; do not wait until your next office visit. The discharge information has been reviewed with the patient. The patient verbalized understanding. Discharge medications reviewed with the patient and appropriate educational materials and side effects teaching were provided.   ___________________________________________________________________________________________________________________________________

## 2021-11-01 NOTE — ROUTINE PROCESS
Medicated with Fioricet PO for c/o neck pain. Had a one time Tylenol order this morning and the MD suggested to try the Fioricet. Also complains of gas pain. States she has had one stool today \"1/2 and 1/2\".

## 2021-11-01 NOTE — PROGRESS NOTES
Care Management Interventions  PCP Verified by CM: Yes Jeff Arevalo NP, [facility])  Last Visit to PCP: 10/25/21  Palliative Care Criteria Met (RRAT>21 & CHF Dx)?: No  Mode of Transport at Discharge: Self (POV/cousin)  Transition of Care Consult (CM Consult): Discharge Planning  MyChart Signup: No  Discharge Durable Medical Equipment: No  Physical Therapy Consult: Yes  Occupational Therapy Consult: No  Speech Therapy Consult: No  Support Systems: Friend/Neighbor, Other Family Member(s)  Confirm Follow Up Transport: Self   Resource Information Provided?: No  Discharge Location  Discharge Placement: Home    Patient is being discharged back home. She REFUSED home health. Did not say she needed anything for discharge. Patient will be picked up by her cousin today. She is in a hurry to go home to her cat. Patient is aware and in agreement with dc plans. Patient told to contact care management if she has any further questions or concerns. RRAT: 5 LOW    Transition of Care (LENA) Plan:  Home     LENA Transportation:       How is patient being transported at discharge? POV cousin     When? Today     Is transport scheduled? N/a     Follow-up appointment and transportation:     PCP? Jeff Arevalo NP      Who is transporting to the follow-up appointment? POV      Is transport for follow up appointment scheduled? N/a     Communication plan (with patient/family):Patient aware and in agreement with dc plan      Who is being called? Patient or Next of Kin? Responsible party? Patient       What number(s) is to be used? [phone]      What service provider is calling for [facility] services? [facility]       When are they calling?  24--48 hours prior to jayne      Click here to complete [address] including selection of the Healthcare Decision Maker Relationship (ie \"Primary\")  [email or web address]

## 2021-11-01 NOTE — DISCHARGE SUMMARY
HOSPITALIST DISCHARGE SUMMARY    NAME: Raya Gambino   :  1940   MRN:  [id number]     Date/Time:  2021 3:52 PM    DISCHARGE DIAGNOSIS:  Hypovolemic hyponatremia  Viral gastroenteritis  Essential hypertension  Short-bowel syndrome  History of ovarian cancer status post resection  History of diverticulitis status post partial colectomy    CONSULTATIONS: None    Procedures: see electronic medical records for all procedures/Xrays and details which were not copied into this note but were reviewed prior to creation of Plan. Please follow-up tests/labs that are still pendin. None     DISCHARGE SUMMARY/HOSPITAL COURSE: for full details see H&P, daily progress notes, labs, consult notes. Briefly As Per HPI:    This is an 80-year-old female with a history of diverticulitis status post partial colectomy resulting in short bowel syndrome, hypertension, GERD, hyperlipidemia, ovarian cancer status post resection in remission, who presented with several days of vomiting and diarrhea. She was found to be hyponatremic. Hyponatremia improved with IV fluids. There were no signs of sepsis or dysentery. Patient's nausea, vomiting, and diarrhea resolved with supportive measures. Given patient's age and current diagnosis of hyponatremia, her home Hyzaar was stopped. She was advised to instead take losartan 100 mg separately. For additional antihypertensive effect, her amlodipine was switched to nifedipine 60 mg daily. Patient was advised to follow-up with her PCP in about 1 week to check on her sodium and blood pressure.      _______________________________________________________________________   Patient seen and examined by me on day of discharge.   Pertinent findings are:  Vitals:  Visit Vitals  /72 (BP 1 Location: Right upper arm, BP Patient Position: At rest)   Pulse 82   Temp 98.8 °F (37.1 °C)   Resp 20   Ht 5' 3\" (1.6 m)   Wt 63.5 kg (140 lb)   SpO2 98%   BMI 24.80 kg/m² Temp (24hrs), Av.5 °F (36.9 °C), Min:98.1 °F (36.7 °C), Max:98.8 °F (37.1 °C)      Last 24hr Input/Output:    Intake/Output Summary (Last 24 hours) at 2021 1552  Last data filed at 2021 1200  Gross per 24 hour   Intake 480 ml   Output    Net 480 ml        PHYSICAL EXAM:   General: Alert and awake  HEENT: Sclerae anicteric. Extra-occular muscles are intact. Cardiovascular: Regular rate and rhythm. No murmurs, gallops, or rubs. Respiratory: Comfortable breathing with no accessory muscle use. Clear breath sounds with no wheezes, rales, or rhonchi. GI: Abdomen nondistended, soft, and nontender. Normal active bowel sounds. Rectal: Deferred   Musculoskeletal: No pitting edema of the lower legs. Neurological: Gross memory appears intact. Patient is alert and oriented. No gross focal deficits    See Discharge Instructions for further details. _______________________________________________________________________  DISPOSITION:    Home with Family:    Home with HH/PT/OT/RN:    SNF/LTC:    PIA:    OTHER:    ________________________________________________________________________  Medications Reviewed:  Current Discharge Medication List      START taking these medications    Details   losartan (COZAAR) 100 mg tablet Take 1 Tablet by mouth daily for 30 days. Qty: 30 Tablet, Refills: 1  Start date: 2021, End date: 2021      NIFEdipine ER (PROCARDIA XL) 60 mg ER tablet Take 1 Tablet by mouth daily for 60 days. Qty: 30 Tablet, Refills: 1  Start date: 2021, End date: 2022         CONTINUE these medications which have NOT CHANGED    Details   cholestyramine-sucrose 4 gram powder MIX 1 SCOOP (4 GRAMS TOTAL) WITH LIQUID AND DRINK TWICE DAILY  Qty: 756 g, Refills: 12    Comments: Requesting 2 cans. Needs brands \"Sandoz or Par\". She has had side effects to other brands.   Associated Diagnoses: Hyperlipidemia, unspecified hyperlipidemia type; Gastroesophageal reflux disease without esophagitis; Malignant neoplasm of ovary, unspecified laterality (HCC)      zolpidem (AMBIEN) 5 mg tablet TAKE 1 TABLET BY MOUTH NIGHTLY AS NEEDED FOR SLEEP. MAX DAILY AMOUNT 1 TABLET  Qty: 90 Tablet, Refills: 0    Associated Diagnoses: Primary insomnia      spironolactone (ALDACTONE) 25 mg tablet TAKE 1/2 TABLET BY MOUTH DAILY. Qty: 45 Tab, Refills: 3    Comments: Scored. Please cut in half for patient. Associated Diagnoses: Essential hypertension      metoprolol succinate (TOPROL-XL) 50 mg XL tablet Take 1 Tab by mouth daily. Qty: 90 Tab, Refills: 4    Associated Diagnoses: Essential hypertension      acetaminophen (TylenoL) 325 mg tablet Take 650 mg by mouth every four (4) hours as needed for Pain.      triamcinolone acetonide (KENALOG) 0.1 % topical cream Apply  to affected area two (2) times a day. use thin layer to itchy skin as needed  Qty: 80 g, Refills: 3    Associated Diagnoses: Actinic keratoses      rosuvastatin (CRESTOR) 5 mg tablet Take 1 Tab by mouth nightly. Indications: excessive fat in the blood  Qty: 90 Tab, Refills: 3    Associated Diagnoses: Pure hypercholesterolemia      omeprazole (PRILOSEC) 20 mg capsule TAKE 1 CAPSULE BY MOUTH TWO TIMES DAILY INDICATIONS:  PREVENTION OF NSAID-INDUCED GASTRIC ULCER  Qty: 180 Cap, Refills: 3    Associated Diagnoses: Essential hypertension      ALPRAZolam (XANAX) 0.5 mg tablet Take 1 Tab by mouth two (2) times daily as needed for Anxiety. Max Daily Amount: 1 mg.   Qty: 180 Tab, Refills: 0    Associated Diagnoses: Anxiety         STOP taking these medications       losartan-hydroCHLOROthiazide (HYZAAR) 100-12.5 mg per tablet Comments:   Reason for Stopping:         amLODIPine (NORVASC) 10 mg tablet Comments:   Reason for Stopping:               PMH/SH reviewed - no change compared to H&P  ________________________________________________________________________    Recommended diet:  DIET ADULT    Recommended activity:Activity as tolerated     Indwelling devices:  None    Supplemental Oxygen: None    Required Lab work: BMP in 1 wk    Glucose management:  None    Code status: Full      Follow Up:   Follow-up Information     Follow up With Specialties Details Why Contact Info    Lisa Beavers, MATT Nurse Practitioner   [address]  [phone]      Lisa Beavers NP Nurse Practitioner In 1 week Check sodium levels and BP [address] [facility] [address] [facility]  [phone]             ______________________________________________________________________  Total time spent in discharge (min): >35 min   ________________________________________________________________________    Care Plan discussed with:    Comments   Patient x    Family      RN x    Care Manager x                   Consultant:      ________________________________________________________________________  Attending Physician: Chelsea Vu MD  ________________________________________________________________________  **Lab Data Reviewed:  Recent Results (from the past 24 hour(s))   CBC W/O DIFF    Collection Time: 11/01/21  5:23 AM   Result Value Ref Range    WBC 7.3 3.6 - 11.0 K/uL    RBC 3.88 3.80 - 5.20 M/uL    HGB 11.0 (L) 11.5 - 16.0 g/dL    HCT 32.5 (L) 35.0 - 47.0 %    MCV 83.8 80.0 - 99.0 FL    MCH 28.4 26.0 - 34.0 PG    MCHC 33.8 30.0 - 36.5 g/dL    RDW 14.0 11.5 - 14.5 %    PLATELET 889 393 - 763 K/uL    MPV 9.4 8.9 - 12.9 FL    NRBC 0.0 0  WBC    ABSOLUTE NRBC 0.00 0.00 - 7.18 K/uL   METABOLIC PANEL, COMPREHENSIVE    Collection Time: 11/01/21  5:23 AM   Result Value Ref Range    Sodium 133 (L) 136 - 145 mmol/L    Potassium 4.6 3.5 - 5.1 mmol/L    Chloride 100 97 - 108 mmol/L    CO2 24 21 - 32 mmol/L    Anion gap 9 5 - 15 mmol/L    Glucose 95 65 - 100 mg/dL    BUN 12 6 - 20 MG/DL    Creatinine 0.94 0.55 - 1.02 MG/DL    BUN/Creatinine ratio 13 12 - 20      GFR est AA >60 >60 ml/min/1.73m2    GFR est non-AA 57 (L) >60 ml/min/1.73m2    Calcium 8.6 8.5 - 10.1 MG/DL Bilirubin, total 0.2 0.2 - 1.0 MG/DL    ALT (SGPT) 27 12 - 78 U/L    AST (SGOT) 19 15 - 37 U/L    Alk. phosphatase 72 45 - 117 U/L    Protein, total 6.0 (L) 6.4 - 8.2 g/dL    Albumin 2.8 (L) 3.5 - 5.0 g/dL    Globulin 3.2 2.0 - 4.0 g/dL    A-G Ratio 0.9 (L) 1.1 - 2.2     MAGNESIUM    Collection Time: 11/01/21  5:23 AM   Result Value Ref Range    Magnesium 2.0 1.6 - 2.4 mg/dL   SAMPLES BEING HELD    Collection Time: 11/01/21  5:23 AM   Result Value Ref Range    SAMPLES BEING HELD 1SST     COMMENT        Add-on orders for these samples will be processed based on acceptable specimen integrity and analyte stability, which may vary by analyte. CT ABD PELV W CONT   Final Result   No acute process. CT HEAD WO CONT   Final Result   No acute intracranial abnormality.

## 2021-11-01 NOTE — ROUTINE PROCESS
Discharge instructions reviewed with patient Personal belongings returned: n/a Home meds returned: n/a To front entrance via wheelchair Discharged home with  friend @ [phone]

## 2021-11-02 ENCOUNTER — TELEPHONE (OUTPATIENT)
Dept: FAMILY MEDICINE CLINIC | Age: 81
End: 2021-11-02

## 2021-11-02 NOTE — TELEPHONE ENCOUNTER
Attempted to call patient to document LENA followup. Unable to reach. Will continue to attempt to contact patient.

## 2021-11-02 NOTE — PROGRESS NOTES
Contacted the patient to check on her, to see if she changed her mind about home health and to see if she wanted me to make the F/U appointment with the PCP. Her PCP is Loren Bynum NP but she stated that she was going to make the appointment with Dr. Ben Clark since so many changes has been made with her care plan regarding her medications. Encouraged her to call if she should have any needs, questions, concerns and other issues.

## 2021-11-02 NOTE — TELEPHONE ENCOUNTER
Mignon Hawk has been contacted regarding recent hospital admission. Current medications were reviewed with the patient/caregiver by telephone. Date of Admission:  10/30/2021     Date of Discharge: 11/01/2021     Facility patient was discharged from: [facility])     Reason for Admission: Hypovolemic Hyponatremia    Any medication changes? Yes Losartan and Nifedipine     How is the patient feeling? Pt feeling much better, no headache. Has been resting. Does the patient have any questions or problems? No questions/concerns at this time. Does the patient need transportation? Yes   Follow-up Appointment date: 11/08/2021       I advised the patient to bring her medications in the original bottles and to contact office, or go to either urgent care or emergency care if symptoms return before scheduled appointment.     Zacarias Fallon 11/2/2021 3:33 PM

## 2021-11-08 ENCOUNTER — OFFICE VISIT (OUTPATIENT)
Dept: FAMILY MEDICINE CLINIC | Age: 81
End: 2021-11-08
Payer: MEDICARE

## 2021-11-08 VITALS
HEART RATE: 86 BPM | WEIGHT: 140 LBS | DIASTOLIC BLOOD PRESSURE: 64 MMHG | SYSTOLIC BLOOD PRESSURE: 122 MMHG | BODY MASS INDEX: 24.8 KG/M2 | RESPIRATION RATE: 18 BRPM | HEIGHT: 63 IN | OXYGEN SATURATION: 99 % | TEMPERATURE: 98.1 F

## 2021-11-08 DIAGNOSIS — E87.1 HYPONATREMIA: Primary | ICD-10-CM

## 2021-11-08 DIAGNOSIS — I10 ESSENTIAL HYPERTENSION: ICD-10-CM

## 2021-11-08 DIAGNOSIS — K21.9 GASTROESOPHAGEAL REFLUX DISEASE WITHOUT ESOPHAGITIS: ICD-10-CM

## 2021-11-08 DIAGNOSIS — R09.82 POST-NASAL DRIP: ICD-10-CM

## 2021-11-08 DIAGNOSIS — Z09 HOSPITAL DISCHARGE FOLLOW-UP: ICD-10-CM

## 2021-11-08 LAB
ANION GAP SERPL CALC-SCNC: 5 MMOL/L (ref 5–15)
ATRIAL RATE: 71 BPM
BUN SERPL-MCNC: 14 MG/DL (ref 6–20)
BUN/CREAT SERPL: 15 (ref 12–20)
CALCIUM SERPL-MCNC: 8.9 MG/DL (ref 8.5–10.1)
CALCULATED P AXIS, ECG09: 58 DEGREES
CALCULATED R AXIS, ECG10: -16 DEGREES
CALCULATED T AXIS, ECG11: 46 DEGREES
CHLORIDE SERPL-SCNC: 101 MMOL/L (ref 97–108)
CO2 SERPL-SCNC: 27 MMOL/L (ref 21–32)
CREAT SERPL-MCNC: 0.96 MG/DL (ref 0.55–1.02)
DIAGNOSIS, 93000: NORMAL
GLUCOSE SERPL-MCNC: 124 MG/DL (ref 65–100)
P-R INTERVAL, ECG05: 190 MS
POTASSIUM SERPL-SCNC: 4.1 MMOL/L (ref 3.5–5.1)
Q-T INTERVAL, ECG07: 414 MS
QRS DURATION, ECG06: 92 MS
QTC CALCULATION (BEZET), ECG08: 449 MS
SODIUM SERPL-SCNC: 133 MMOL/L (ref 136–145)
VENTRICULAR RATE, ECG03: 71 BPM

## 2021-11-08 PROCEDURE — 99215 OFFICE O/P EST HI 40 MIN: CPT | Performed by: FAMILY MEDICINE

## 2021-11-08 RX ORDER — OMEPRAZOLE 20 MG/1
CAPSULE, DELAYED RELEASE ORAL
Qty: 90 CAPSULE | Refills: 3 | Status: SHIPPED | OUTPATIENT
Start: 2021-11-08 | End: 2022-11-04 | Stop reason: SDUPTHER

## 2021-11-08 RX ORDER — LORATADINE 10 MG/1
10 TABLET ORAL DAILY
Qty: 30 TABLET | Refills: 3 | Status: ON HOLD | OUTPATIENT
Start: 2021-11-08 | End: 2022-04-16

## 2021-11-08 RX ORDER — NIFEDIPINE 60 MG/1
60 TABLET, EXTENDED RELEASE ORAL DAILY
Qty: 90 TABLET | Refills: 3 | Status: SHIPPED | OUTPATIENT
Start: 2021-11-08 | End: 2022-10-14 | Stop reason: SDUPTHER

## 2021-11-08 RX ORDER — LOSARTAN POTASSIUM 100 MG/1
100 TABLET ORAL DAILY
Qty: 90 TABLET | Refills: 3 | Status: SHIPPED | OUTPATIENT
Start: 2021-11-08 | End: 2022-10-14 | Stop reason: SDUPTHER

## 2021-11-08 NOTE — PROGRESS NOTES
Chief Complaint   Patient presents with    Transitions Of Care    Head Pain     started this am    Other     Patient states she has Brain  Fog     1. Have you been to the ER, urgent care clinic since your last visit? Hospitalized since your last visit? Yes Reason for visit: [address]    2. Have you seen or consulted any other health care providers outside of the [address] [place] [address] since your last visit? Include any pap smears or colon screening. No    Identified pt with two pt identifiers(name and ). Reviewed record in preparation for visit and have obtained necessary documentation.     Symptom review:    NO  Fever   NO  Shaking chills  NO  Cough  NO Headaches  NO  Body aches  NO  Coughing up blood  NO  Chest congestion  NO  Chest pain  NO  Shortness of breath  NO  Profound Loss of smell/taste  NO  Nausea/Vomiting   NO  Loose stool/Diarrhea  NO  any skin issues

## 2021-11-08 NOTE — PROGRESS NOTES
Gerson Arzate is a [de-identified] y.o. female    [facility] visit  Admission: 10/30/21  D/C 11/1/2021  LENA call 11/2/21    DISCHARGE DIAGNOSIS:  Hypovolemic hyponatremia  Viral gastroenteritis  Essential hypertension  Short-bowel syndrome  History of ovarian cancer status post resection  History of diverticulitis status post partial colectomy    Hosp Course  This is an 50-year-old female with a history of diverticulitis status post partial colectomy resulting in short bowel syndrome, hypertension, GERD, hyperlipidemia, ovarian cancer status post resection in remission, who presented with several days of vomiting and diarrhea. She was found to be hyponatremic. Hyponatremia improved with IV fluids. There were no signs of sepsis or dysentery. Patient's nausea, vomiting, and diarrhea resolved with supportive measures.       Given patient's age and current diagnosis of hyponatremia, her home Hyzaar was stopped. She was advised to instead take losartan 100 mg separately. For additional antihypertensive effect, her amlodipine was switched to nifedipine 60 mg daily. Patient was advised to follow-up with her PCP in about 1 week to check on her sodium and blood pressure. HPI:  Symptoms include HA, post-nasal gtt, without fever, chills, with mild cough, congestion. Not taking allergy pills at this time. Onset of symptoms was about 3  days ago, stable since that time. Evaluation to date: none. Treatment to date: mucinex, no help. Hypertension. Blood pressures are improving. Management at last visit included change hyzaar to plain losartan and change norvasc to procardia XL 60mg/d. Current regimen: angiotensin II receptor antagonist, beta-blocker, calcium channel blocker and aldactone. Symptoms include no symptoms. Patient denies chest pain, palpitations, peripheral edema.   Lab review:   Lab Results   Component Value Date/Time    Sodium 133 (L) 11/01/2021 05:23 AM    Potassium 4.6 11/01/2021 05:23 AM    Chloride 100 11/01/2021 05:23 AM    CO2 24 11/01/2021 05:23 AM    Anion gap 9 11/01/2021 05:23 AM    Glucose 95 11/01/2021 05:23 AM    BUN 12 11/01/2021 05:23 AM    Creatinine 0.94 11/01/2021 05:23 AM    BUN/Creatinine ratio 13 11/01/2021 05:23 AM    GFR est AA >60 11/01/2021 05:23 AM    GFR est non-AA 57 (L) 11/01/2021 05:23 AM    Calcium 8.6 11/01/2021 05:23 AM     PMH, SH, Medications/Allergies: reviewed, on chart. Current Outpatient Medications   Medication Sig    losartan (COZAAR) 100 mg tablet Take 1 Tablet by mouth daily for 30 days.  NIFEdipine ER (PROCARDIA XL) 60 mg ER tablet Take 1 Tablet by mouth daily for 60 days.  cholestyramine-sucrose 4 gram powder MIX 1 SCOOP (4 GRAMS TOTAL) WITH LIQUID AND DRINK TWICE DAILY    zolpidem (AMBIEN) 5 mg tablet TAKE 1 TABLET BY MOUTH NIGHTLY AS NEEDED FOR SLEEP. MAX DAILY AMOUNT 1 TABLET    triamcinolone acetonide (KENALOG) 0.1 % topical cream Apply  to affected area two (2) times a day. use thin layer to itchy skin as needed    spironolactone (ALDACTONE) 25 mg tablet TAKE 1/2 TABLET BY MOUTH DAILY.  metoprolol succinate (TOPROL-XL) 50 mg XL tablet Take 1 Tab by mouth daily.  rosuvastatin (CRESTOR) 5 mg tablet Take 1 Tab by mouth nightly. Indications: excessive fat in the blood    acetaminophen (TylenoL) 325 mg tablet Take 650 mg by mouth every four (4) hours as needed for Pain.  omeprazole (PRILOSEC) 20 mg capsule TAKE 1 CAPSULE BY MOUTH TWO TIMES DAILY INDICATIONS:  PREVENTION OF NSAID-INDUCED GASTRIC ULCER    ALPRAZolam (XANAX) 0.5 mg tablet Take 1 Tab by mouth two (2) times daily as needed for Anxiety. Max Daily Amount: 1 mg. No current facility-administered medications for this visit.       ROS:  Constitutional: No fever, chills or abnormal weight loss  Respiratory: No cough, SOB   CV: No chest pain or Palpitations    Visit Vitals  /64 (BP 1 Location: Left arm)   Pulse 86   Temp 98.1 °F (36.7 °C) (Oral)   Resp 18   Ht 5' 3\" (1.6 m)   Wt 140 lb (63.5 kg) SpO2 99%   BMI 24.80 kg/m²     Wt Readings from Last 3 Encounters:   11/08/21 140 lb (63.5 kg)   10/30/21 140 lb (63.5 kg)   10/18/21 143 lb (64.9 kg)     BP Readings from Last 3 Encounters:   11/08/21 122/64   11/01/21 136/72   10/18/21 138/62     Physical Examination: General appearance - alert, well appearing, and in no distress  Mental status - alert, oriented to person, place, and time  Eyes - pupils equal and reactive, extraocular eye movements intact  ENT - bilateral external ears and nose normal. Normal lips  Neck - supple, no significant adenopathy, no thyromegaly or mass  Chest - clear to auscultation, no wheezes, rales or rhonchi, symmetric air entry  Heart - normal rate, regular rhythm, normal S1, S2, no murmurs, rubs, clicks or gallops  Abd - NABS. SNT, no HSM/Mass. Extremities - peripheral pulses normal, no pedal edema, no clubbing or cyanosis    A/p:  Hyponatremia and hospital followup  Mild on d/c. Recheck today off thiazide, adj PRN. If Na+ still low, may need to change aldactone    HTN  well controlled. con't current tx. If Na+ still low, may need to change aldactone    Headache and post-nasal gtt  Likely 2nd allergies. Add claritin 10mg/d. Monitor.

## 2021-11-16 PROCEDURE — 74011000636 HC RX REV CODE- 636: Performed by: FAMILY MEDICINE

## 2021-11-16 RX ADMIN — IOPAMIDOL 100 ML: 612 INJECTION, SOLUTION INTRAVENOUS at 15:00

## 2021-11-24 DIAGNOSIS — F51.01 PRIMARY INSOMNIA: ICD-10-CM

## 2021-11-26 RX ORDER — ZOLPIDEM TARTRATE 5 MG/1
TABLET ORAL
Qty: 90 TABLET | Refills: 1 | Status: SHIPPED | OUTPATIENT
Start: 2021-11-26 | End: 2022-04-16

## 2021-11-26 RX ORDER — ACETAMINOPHEN 325 MG/1
650 TABLET ORAL
Qty: 180 TABLET | Refills: 4 | Status: ON HOLD | OUTPATIENT
Start: 2021-11-26 | End: 2022-04-16

## 2021-12-16 ENCOUNTER — TELEPHONE (OUTPATIENT)
Dept: FAMILY MEDICINE CLINIC | Age: 81
End: 2021-12-16

## 2021-12-16 NOTE — TELEPHONE ENCOUNTER
Pt called office C/O chest pain, consistent since 0900. Believes it is a \"digestive issue\" but took prevacid with no relief and symptoms have no improved. Pt stated pain is not radiating but is heavy and in the center of her chest. Had similar symptoms/episode last week but didn't last as long. Instructed patient it would be wise to go to the ED to be evaluated, have bloodwork drawn and EKG. Patient stated \" well yall can do that there\". Informed her it would take to long to get results back and we would have to send her to the hospital for further testing anyway. Pt refused and said she would not go. Informed her that her PCP is out of the office today and that we do not have any other openings available. Informed her that the first opening I had was Monday and that I would highly suggest that she go to the ED again for evaluation just to make sure she is okay.

## 2021-12-16 NOTE — TELEPHONE ENCOUNTER
----- Message from Christel Bird sent at 12/16/2021  1:43 PM EST -----  Subject: Message to Provider    QUESTIONS  Information for Provider? Patient would like a call-back from [facility] at your   office. The ECC is not sure what she wants to talk about, but if MAURICE SALVADOR could   give her call back at her earliest convenience that would be super. Thank   you.  ---------------------------------------------------------------------------  --------------  CALL BACK INFO  What is the best way for the office to contact you? Do not leave any   message, patient will call back for answer  Preferred Call Back Phone Number? [phone]  ---------------------------------------------------------------------------  --------------  SCRIPT ANSWERS  Relationship to Patient?  Self

## 2021-12-17 ENCOUNTER — TELEPHONE (OUTPATIENT)
Dept: FAMILY MEDICINE CLINIC | Age: 81
End: 2021-12-17

## 2021-12-17 NOTE — TELEPHONE ENCOUNTER
Pt called in today with same C/O. 2 episodes of chest pain that have lasted for hours. Took tylenol and prevacid with no relief. Chest pain eventually went away around 6pm last night and returned early this morning. Spoke with PCP Westley Cortez who instructed this nurse to advise patient to go to the ED for cardiac workup as this has been a recurrent issue. Educated patient that at this office it is unable to obtain same day results on labs or completed necessary scans, all that should be completed with an emergency room visit. Pt informed me that I was incorrect and \"didn't know what I was talking about\" that she needed to be seen today. Again informed her that this was not something that could be evaluated in the office, that she would need further testing done if she was having a cardiac episode that we couldn't treat her in office. Pt began yelling that this was unacceptable. Again tried to inform her that her health was important and this needed to be ruled out as soon as possible.  Pt hung up phone while this nurse was still attempting to communicate

## 2021-12-20 NOTE — PATIENT INSTRUCTIONS
20-Dec-2021 11:33 Back Stretches: Exercises  Your Care Instructions  Here are some examples of exercises for stretching your back. Start each exercise slowly. Ease off the exercise if you start to have pain. Your doctor or physical therapist will tell you when you can start these exercises and which ones will work best for you. How to do the exercises  Overhead stretch    1. Stand comfortably with your feet shoulder-width apart. 2. Looking straight ahead, raise both arms over your head and reach toward the ceiling. Do not allow your head to tilt back. 3. Hold for 15 to 30 seconds, then lower your arms to your sides. 4. Repeat 2 to 4 times. Side stretch    1. Stand comfortably with your feet shoulder-width apart. 2. Raise one arm over your head, and then lean to the other side. 3. Slide your hand down your leg as you let the weight of your arm gently stretch your side muscles. Hold for 15 to 30 seconds. 4. Repeat 2 to 4 times on each side. Press-up    1. Lie on your stomach, supporting your body with your forearms. 2. Press your elbows down into the floor to raise your upper back. As you do this, relax your stomach muscles and allow your back to arch without using your back muscles. As your press up, do not let your hips or pelvis come off the floor. 3. Hold for 15 to 30 seconds, then relax. 4. Repeat 2 to 4 times. Relax and rest    1. Lie on your back with a rolled towel under your neck and a pillow under your knees. Extend your arms comfortably to your sides. 2. Relax and breathe normally. 3. Remain in this position for about 10 minutes. 4. If you can, do this 2 or 3 times each day. Follow-up care is a key part of your treatment and safety. Be sure to make and go to all appointments, and call your doctor if you are having problems. It's also a good idea to know your test results and keep a list of the medicines you take. Where can you learn more? Go to http://gonzalo-donny.info/.   Enter F984 in the search box to learn more about \"Back Stretches: Exercises. \"  Current as of: March 21, 2017  Content Version: 11.4  © 5624-4452 [employer]. Care instructions adapted under license by [employer] (which disclaims liability or warranty for this information). If you have questions about a medical condition or this instruction, always ask your healthcare professional. [place] [address] any warranty or liability for your use of this information.

## 2022-02-08 ENCOUNTER — TELEPHONE (OUTPATIENT)
Dept: FAMILY MEDICINE CLINIC | Age: 82
End: 2022-02-08

## 2022-02-08 NOTE — TELEPHONE ENCOUNTER
Silas Roblero is out of office, forwarding message to Nurse Jef Goldman,  ----- Message from Ifeoma Pantoja sent at 2/8/2022  1:55 PM EST -----  Subject: Message to Provider    QUESTIONS  Information for Provider? patient would like for Olga Strong to give her a   call as soon as possible   ---------------------------------------------------------------------------  --------------  CALL BACK INFO  What is the best way for the office to contact you? OK to leave message on   voicemail  Preferred Call Back Phone Number? [phone]  ---------------------------------------------------------------------------  --------------  SCRIPT ANSWERS  Relationship to Patient?  Self

## 2022-02-11 ENCOUNTER — OFFICE VISIT (OUTPATIENT)
Dept: FAMILY MEDICINE CLINIC | Age: 82
End: 2022-02-11
Payer: MEDICARE

## 2022-02-11 VITALS
HEART RATE: 84 BPM | RESPIRATION RATE: 20 BRPM | TEMPERATURE: 97.8 F | DIASTOLIC BLOOD PRESSURE: 60 MMHG | HEIGHT: 63 IN | BODY MASS INDEX: 24.5 KG/M2 | OXYGEN SATURATION: 99 % | WEIGHT: 138.25 LBS | SYSTOLIC BLOOD PRESSURE: 120 MMHG

## 2022-02-11 DIAGNOSIS — E78.5 HYPERLIPIDEMIA, UNSPECIFIED HYPERLIPIDEMIA TYPE: ICD-10-CM

## 2022-02-11 DIAGNOSIS — M54.2 NECK PAIN: ICD-10-CM

## 2022-02-11 DIAGNOSIS — I10 ESSENTIAL HYPERTENSION: Primary | ICD-10-CM

## 2022-02-11 DIAGNOSIS — C56.9 MALIGNANT NEOPLASM OF OVARY, UNSPECIFIED LATERALITY (HCC): ICD-10-CM

## 2022-02-11 DIAGNOSIS — R73.9 HYPERGLYCEMIA: ICD-10-CM

## 2022-02-11 DIAGNOSIS — M47.812 SPONDYLOSIS OF CERVICAL REGION WITHOUT MYELOPATHY OR RADICULOPATHY: ICD-10-CM

## 2022-02-11 DIAGNOSIS — E87.1 HYPONATREMIA: ICD-10-CM

## 2022-02-11 DIAGNOSIS — Z85.43 HISTORY OF OVARIAN CANCER: ICD-10-CM

## 2022-02-11 DIAGNOSIS — K21.9 GASTROESOPHAGEAL REFLUX DISEASE WITHOUT ESOPHAGITIS: ICD-10-CM

## 2022-02-11 DIAGNOSIS — E78.00 PURE HYPERCHOLESTEROLEMIA: ICD-10-CM

## 2022-02-11 DIAGNOSIS — G44.86 CERVICOGENIC HEADACHE: ICD-10-CM

## 2022-02-11 LAB
ANION GAP SERPL CALC-SCNC: 4 MMOL/L (ref 5–15)
BASOPHILS # BLD: 0.1 K/UL (ref 0–0.1)
BASOPHILS NFR BLD: 1 % (ref 0–1)
BUN SERPL-MCNC: 12 MG/DL (ref 6–20)
BUN/CREAT SERPL: 14 (ref 12–20)
CALCIUM SERPL-MCNC: 8.9 MG/DL (ref 8.5–10.1)
CANCER AG125 SERPL-ACNC: 6 U/ML (ref 1.5–35)
CHLORIDE SERPL-SCNC: 107 MMOL/L (ref 97–108)
CO2 SERPL-SCNC: 25 MMOL/L (ref 21–32)
CREAT SERPL-MCNC: 0.88 MG/DL (ref 0.55–1.02)
DIFFERENTIAL METHOD BLD: NORMAL
EOSINOPHIL # BLD: 0.1 K/UL (ref 0–0.4)
EOSINOPHIL NFR BLD: 1 % (ref 0–7)
ERYTHROCYTE [DISTWIDTH] IN BLOOD BY AUTOMATED COUNT: 14.5 % (ref 11.5–14.5)
EST. AVERAGE GLUCOSE BLD GHB EST-MCNC: 123 MG/DL
GLUCOSE SERPL-MCNC: 118 MG/DL (ref 65–100)
HBA1C MFR BLD: 5.9 % (ref 4–5.6)
HCT VFR BLD AUTO: 38.4 % (ref 35–47)
HGB BLD-MCNC: 12.2 G/DL (ref 11.5–16)
IMM GRANULOCYTES # BLD AUTO: 0 K/UL (ref 0–0.04)
IMM GRANULOCYTES NFR BLD AUTO: 0 % (ref 0–0.5)
LYMPHOCYTES # BLD: 2.6 K/UL (ref 0.8–3.5)
LYMPHOCYTES NFR BLD: 37 % (ref 12–49)
MCH RBC QN AUTO: 28.4 PG (ref 26–34)
MCHC RBC AUTO-ENTMCNC: 31.8 G/DL (ref 30–36.5)
MCV RBC AUTO: 89.3 FL (ref 80–99)
MONOCYTES # BLD: 0.6 K/UL (ref 0–1)
MONOCYTES NFR BLD: 9 % (ref 5–13)
NEUTS SEG # BLD: 3.7 K/UL (ref 1.8–8)
NEUTS SEG NFR BLD: 52 % (ref 32–75)
NRBC # BLD: 0 K/UL (ref 0–0.01)
NRBC BLD-RTO: 0 PER 100 WBC
PLATELET # BLD AUTO: 275 K/UL (ref 150–400)
PMV BLD AUTO: 10.4 FL (ref 8.9–12.9)
POTASSIUM SERPL-SCNC: 4.6 MMOL/L (ref 3.5–5.1)
RBC # BLD AUTO: 4.3 M/UL (ref 3.8–5.2)
SODIUM SERPL-SCNC: 136 MMOL/L (ref 136–145)
WBC # BLD AUTO: 7.1 K/UL (ref 3.6–11)

## 2022-02-11 PROCEDURE — 99213 OFFICE O/P EST LOW 20 MIN: CPT | Performed by: FAMILY MEDICINE

## 2022-02-11 RX ORDER — CHOLESTYRAMINE 4 G/9G
POWDER, FOR SUSPENSION ORAL
Qty: 3 EACH | Refills: 3 | Status: SHIPPED | OUTPATIENT
Start: 2022-02-11 | End: 2022-10-21

## 2022-02-11 RX ORDER — ROSUVASTATIN CALCIUM 5 MG/1
5 TABLET, COATED ORAL
Qty: 90 TABLET | Refills: 3 | Status: SHIPPED | OUTPATIENT
Start: 2022-02-11 | End: 2022-11-04 | Stop reason: SDUPTHER

## 2022-02-11 NOTE — PROGRESS NOTES
Chief Complaint   Patient presents with    Hypertension       Marvis Severin is a [de-identified] y.o. female  HPI:    Hypertension. Blood pressures are in goal. Management at last visit included con't current regimen: angiotensin II receptor antagonist, beta-blocker, calcium channel blocker and aldactone. Symptoms include no symptoms. Patient denies chest pain, palpitations. Does have mild peripheral edema, stable, but worse at night. Lab review:   Lab Results   Component Value Date/Time    Sodium 133 (L) 11/08/2021 10:50 AM    Potassium 4.1 11/08/2021 10:50 AM    Chloride 101 11/08/2021 10:50 AM    CO2 27 11/08/2021 10:50 AM    Anion gap 5 11/08/2021 10:50 AM    Glucose 124 (H) 11/08/2021 10:50 AM    BUN 14 11/08/2021 10:50 AM    Creatinine 0.96 11/08/2021 10:50 AM    BUN/Creatinine ratio 15 11/08/2021 10:50 AM    GFR est AA >60 11/08/2021 10:50 AM    GFR est non-AA 56 (L) 11/08/2021 10:50 AM    Calcium 8.9 11/08/2021 10:50 AM     Hyperlipidemia. On crestor 5mg. Aren well. No myalgias, arthralgias, unusual weakness. Lab Results   Component Value Date/Time    Cholesterol, total 156 08/30/2021 09:49 AM    HDL Cholesterol 68 08/30/2021 09:49 AM    LDL, calculated 49.8 08/30/2021 09:49 AM    VLDL, calculated 38.2 08/30/2021 09:49 AM    Triglyceride 191 (H) 08/30/2021 09:49 AM    CHOL/HDL Ratio 2.3 08/30/2021 09:49 AM       Lab Results   Component Value Date/Time    ALT (SGPT) 27 11/01/2021 05:23 AM    Alk. phosphatase 72 11/01/2021 05:23 AM    Bilirubin, total 0.2 11/01/2021 05:23 AM         PMH, SH, Medications/Allergies: reviewed, on chart. Current Outpatient Medications   Medication Sig    zolpidem (AMBIEN) 5 mg tablet TAKE 1 TABLET BY MOUTH NIGHTLY AS NEEDED FOR SLEEP. MAX DAILY AMOUNT 1 TABLET    acetaminophen (TylenoL) 325 mg tablet Take 2 Tablets by mouth every four (4) hours as needed for Pain.  losartan (COZAAR) 100 mg tablet Take 1 Tablet by mouth daily for 360 days.     NIFEdipine ER (PROCARDIA XL) 60 mg ER tablet Take 1 Tablet by mouth daily.  omeprazole (PRILOSEC) 20 mg capsule TAKE 1 CAPSULE BY MOUTH DAILY INDICATIONS:  PREVENTION OF NSAID-INDUCED GASTRIC ULCER    loratadine (CLARITIN) 10 mg tablet Take 1 Tablet by mouth daily. Indications: allergies    cholestyramine-sucrose 4 gram powder MIX 1 SCOOP (4 GRAMS TOTAL) WITH LIQUID AND DRINK TWICE DAILY    triamcinolone acetonide (KENALOG) 0.1 % topical cream Apply  to affected area two (2) times a day. use thin layer to itchy skin as needed    spironolactone (ALDACTONE) 25 mg tablet TAKE 1/2 TABLET BY MOUTH DAILY.  metoprolol succinate (TOPROL-XL) 50 mg XL tablet Take 1 Tab by mouth daily.  rosuvastatin (CRESTOR) 5 mg tablet Take 1 Tab by mouth nightly. Indications: excessive fat in the blood    ALPRAZolam (XANAX) 0.5 mg tablet Take 1 Tab by mouth two (2) times daily as needed for Anxiety. Max Daily Amount: 1 mg. No current facility-administered medications for this visit.       ROS:  Constitutional: No fever, chills or abnormal weight loss  Respiratory: No cough, SOB   CV: No chest pain or Palpitations    Visit Vitals  /60 (BP 1 Location: Right arm)   Pulse 84   Temp 97.8 °F (36.6 °C) (Temporal)   Resp 20   Ht 5' 3\" (1.6 m)   Wt 138 lb 4 oz (62.7 kg)   SpO2 99%   BMI 24.49 kg/m²   -2#  Wt Readings from Last 3 Encounters:   02/11/22 138 lb 4 oz (62.7 kg)   11/08/21 140 lb (63.5 kg)   10/30/21 140 lb (63.5 kg)     BP Readings from Last 3 Encounters:   02/11/22 120/60   11/08/21 122/64   11/01/21 136/72     Physical Examination: General appearance - alert, well appearing, and in no distress  Mental status - alert, oriented to person, place, and time  Eyes - pupils equal and reactive, extraocular eye movements intact  ENT - bilateral external ears and nose normal. Normal lips  Neck - supple, no significant adenopathy, no thyromegaly or mass  Chest - clear to auscultation, no wheezes, rales or rhonchi, symmetric air entry  Heart - normal rate, regular rhythm, normal S1, S2, no murmurs, rubs, clicks or gallops  Abd - NABS. SNT, no HSM/Mass. Extremities - peripheral pulses normal, no pedal edema, no clubbing or cyanosis    A/p:  Hx Hyponatremia and HTN  Mild on last check. BP good. Recheck today. If Na+ still low, may need to d/c aldactone, consider plain furosemide for edema. 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 Try physical therapy. Irritable bowel with loose stool  Better lately with questran. Con't, Monitor. HLD  well controlled. con't current tx. Plan labs summer 2022.      1. Have you been to the ER, urgent care clinic since your last visit? Hospitalized since your last visit?no    2. Have you seen or consulted any other health care providers outside of the [address] [place] [address] since your last visit? Include any pap smears or colon screening.  No

## 2022-03-19 PROBLEM — M85.89 OSTEOPENIA OF MULTIPLE SITES: Status: ACTIVE | Noted: 2018-06-14

## 2022-03-19 PROBLEM — E87.1 HYPONATREMIA: Status: ACTIVE | Noted: 2021-10-31

## 2022-03-22 DIAGNOSIS — I10 ESSENTIAL HYPERTENSION: ICD-10-CM

## 2022-03-22 RX ORDER — SPIRONOLACTONE 25 MG/1
TABLET ORAL
Qty: 45 TABLET | Refills: 3 | Status: SHIPPED | OUTPATIENT
Start: 2022-03-22

## 2022-04-01 ENCOUNTER — OFFICE VISIT (OUTPATIENT)
Dept: FAMILY MEDICINE CLINIC | Age: 82
End: 2022-04-01
Payer: MEDICARE

## 2022-04-01 ENCOUNTER — NURSE TRIAGE (OUTPATIENT)
Dept: OTHER | Facility: CLINIC | Age: 82
End: 2022-04-01

## 2022-04-01 DIAGNOSIS — S61.451A CAT BITE OF HAND, RIGHT, INITIAL ENCOUNTER: Primary | ICD-10-CM

## 2022-04-01 DIAGNOSIS — L03.113 CELLULITIS OF HAND, RIGHT: ICD-10-CM
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
## 2022-04-01 PROCEDURE — 99213 OFFICE O/P EST LOW 20 MIN: CPT | Performed by: FAMILY MEDICINE

## 2022-04-01 RX ORDER — AMOXICILLIN AND CLAVULANATE POTASSIUM 875; 125 MG/1; MG/1
1 TABLET, FILM COATED ORAL 2 TIMES DAILY
Qty: 20 TABLET | Refills: 0 | Status: SHIPPED | OUTPATIENT
Start: 2022-04-01 | End: 2022-04-11

## 2022-04-01 NOTE — TELEPHONE ENCOUNTER
Received call from Axel Wallace at [facility] with Red Flag Complaint. Subjective: Caller states \"My cat bit me late morning/early afternoon today. \"     Current Symptoms: Swelling and with redness. Bite is on top of R hand near her wrist. Puncture wound, 1/2 in deep. Cat is not vaccinated for rabies and is outside during the day. Unsure when her last tetanus shot was. Onset: a few hours ago; worsening    Associated Symptoms: NA    Pain Severity: 6/10; ; moderate    Temperature: denies fever     What has been tried: neosporin and bandaid    LMP: NA Pregnant: NA    Recommended disposition: Go to ED Now    Care advice provided, patient verbalizes understanding; denies any other questions or concerns; instructed to call back for any new or worsening symptoms. Patient refused ER disposition. Unable to reach staff at PCP office for refusal of urgent disposition. Reinforced importance of being seen in ER. Patient aware will need to transfer in order to get a call back from on call PCP. Transferred to [facility] at [address] [place] [address],[address] to get patient in contact with after hours exchange. Attention Provider: Thank you for allowing me to participate in the care of your patient. The patient was connected to triage in response to information provided to the [facility]. Please do not respond through this encounter as the response is not directed to a shared pool.     Reason for Disposition   Any break in skin from BITE (e.g., cut, puncture, or scratch) and PET animal (e.g., dog, cat, or ferret) at risk for RABIES (e.g., sick, stray, unprovoked bite, developing country)    Protocols used: ANIMAL BITE-ADULT-[place]

## 2022-04-01 NOTE — PROGRESS NOTES
Chief Complaint   Patient presents with    Cat bite     Av Gordon is a [de-identified] y.o. female    HPI:  Symptoms include cat bite from pt's pet cat to back of hand last night. Has been getting more red and sore over the course of the day. Has been washing it and using top abx, but still is getting more sore by the hour. PMH, SH, Medications/Allergies: reviewed, on chart. Current Outpatient Medications   Medication Sig    amoxicillin-clavulanate (AUGMENTIN) 875-125 mg per tablet Take 1 Tablet by mouth two (2) times a day for 10 days. Indications: infected cat bite    spironolactone (ALDACTONE) 25 mg tablet TAKE 1/2 TABLET BY MOUTH DAILY    metoprolol succinate (TOPROL-XL) 50 mg XL tablet Take 1 tablet by mouth once daily    cholestyramine-sucrose 4 gram powder MIX 1 SCOOP (4 GRAMS TOTAL) WITH LIQUID AND DRINK TWICE DAILY    rosuvastatin (CRESTOR) 5 mg tablet Take 1 Tablet by mouth nightly. Indications: excessive fat in the blood    zolpidem (AMBIEN) 5 mg tablet TAKE 1 TABLET BY MOUTH NIGHTLY AS NEEDED FOR SLEEP. MAX DAILY AMOUNT 1 TABLET    acetaminophen (TylenoL) 325 mg tablet Take 2 Tablets by mouth every four (4) hours as needed for Pain.  losartan (COZAAR) 100 mg tablet Take 1 Tablet by mouth daily for 360 days.  NIFEdipine ER (PROCARDIA XL) 60 mg ER tablet Take 1 Tablet by mouth daily.  omeprazole (PRILOSEC) 20 mg capsule TAKE 1 CAPSULE BY MOUTH DAILY INDICATIONS:  PREVENTION OF NSAID-INDUCED GASTRIC ULCER    loratadine (CLARITIN) 10 mg tablet Take 1 Tablet by mouth daily. Indications: allergies    triamcinolone acetonide (KENALOG) 0.1 % topical cream Apply  to affected area two (2) times a day. use thin layer to itchy skin as needed     No current facility-administered medications for this visit. ROS:  Constitutional: No fever, chills or abnormal weight loss  Respiratory: No cough, SOB   CV: No chest pain or Palpitations    VS Review:   Wt Readings from Last 3 Encounters:   02/11/22 138 lb 4 oz (62.7 kg)   11/08/21 140 lb (63.5 kg)   10/30/21 140 lb (63.5 kg)     BP Readings from Last 3 Encounters:   02/11/22 120/60   11/08/21 122/64   11/01/21 136/72     Physical Examination: General appearance - alert, well appearing, and in no distress  Mental status - alert, oriented to person, place, and time  Eyes - pupils equal and reactive, extraocular eye movements intact  Extremities - peripheral pulses normal, no pedal edema, no clubbing or cyanosis. R hand with 1cm puncture/jagged laceration to the dorsal aspect of the thumb web space near the [facility]. A/P:  Cat bite cellulitis R hand  Tx with augmentin. Dressed with bacitracin and sterile strip. Wound care ed given. F/U if not improving or if worsening. 4/4/2022 7:27 AM Addendum: Contacted pt for status check. Healing well, no fevers or d/c, reddens and swelling improving nicely. con't current plan and wound care. PT gave understanding.     Tasneem Tamez MD

## 2022-04-01 NOTE — PATIENT INSTRUCTIONS
Animal Bites: Care Instructions  Overview  After an animal bite, the biggest concern is infection. The chance of infection depends on the type of animal that bit you, where on your body you were bitten, and your general health. Many animal bites are not closed with stitches, because this can increase the chance of infection. Your bite may take as little as 7 days or as long as several months to heal, depending on how bad it is. Taking good care of your wound at home will help it heal and reduce your chance of infection. The doctor has checked you carefully, but problems can develop later. If you notice any problems or new symptoms, get medical treatment right away. Follow-up care is a key part of your treatment and safety. Be sure to make and go to all appointments, and call your doctor if you are having problems. It's also a good idea to know your test results and keep a list of the medicines you take. How can you care for yourself at home? · If your doctor told you how to care for your wound, follow your doctor's instructions. If you did not get instructions, follow this general advice:  ? After 24 to 48 hours, gently wash the wound with clean water 2 times a day. Do not scrub or soak the wound. Don't use hydrogen peroxide or alcohol, which can slow healing. ? You may cover the wound with a thin layer of petroleum jelly, such as Vaseline, and a nonstick bandage. ? Apply more petroleum jelly and replace the bandage as needed. · After you shower, gently dry the wound with a clean towel. · If your doctor has closed the wound, cover the bandage with a plastic bag before you take a shower. · A small amount of skin redness and swelling around the wound edges and the stitches or staples is normal. Your wound may itch or feel irritated. Do not scratch or rub the wound.   · Ask your doctor if you can take an over-the-counter pain medicine, such as acetaminophen (Tylenol), ibuprofen (Advil, Motrin), or naproxen (Vivienne). Read and follow all instructions on the label. · Do not take two or more pain medicines at the same time unless the doctor told you to. Many pain medicines have acetaminophen, which is Tylenol. Too much acetaminophen (Tylenol) can be harmful. · If your bite puts you at risk for rabies, you will get a series of shots over the next few weeks to prevent rabies. Your doctor will tell you when to get the shots. It is very important that you get the full cycle of shots. Follow your doctor's instructions exactly. · You may need a tetanus shot if you have not received one in the last 5 years. · If your doctor prescribed antibiotics, take them as directed. Do not stop taking them just because you feel better. You need to take the full course of antibiotics. When should you call for help? Call your doctor now or seek immediate medical care if:    · The skin near the bite turns cold or pale or it changes color.     · You lose feeling in the area near the bite, or it feels numb or tingly.     · You have trouble moving a limb near the bite.     · You have symptoms of infection, such as:  ? Increased pain, swelling, warmth, or redness near the wound. ? Red streaks leading from the wound. ? Pus draining from the wound. ? A fever.     · Blood soaks through the bandage. Oozing small amounts of blood is normal.     · Your pain is getting worse. Watch closely for changes in your health, and be sure to contact your doctor if you are not getting better as expected. Where can you learn more? Go to http://www.gray.com/  Enter U528 in the search box to learn more about \"Animal Bites: Care Instructions. \"  Current as of: July 1, 2021               Content Version: 13.2  © 1214-8050 [employer]. Care instructions adapted under license by [employer] (which disclaims liability or warranty for this information).  If you have questions about a medical condition or this instruction, always ask your healthcare professional. [place] [address] any warranty or liability for your use of this information.

## 2022-04-15 ENCOUNTER — APPOINTMENT (OUTPATIENT)
Dept: CT IMAGING | Age: 82
End: 2022-04-15
Attending: EMERGENCY MEDICINE
Payer: MEDICARE

## 2022-04-15 ENCOUNTER — HOSPITAL ENCOUNTER (OUTPATIENT)
Age: 82
Setting detail: OBSERVATION
Discharge: HOME | End: 2022-04-16
Attending: EMERGENCY MEDICINE | Admitting: INTERNAL MEDICINE
Payer: MEDICARE

## 2022-04-15 ENCOUNTER — APPOINTMENT (OUTPATIENT)
Dept: MRI IMAGING | Age: 82
End: 2022-04-15
Attending: EMERGENCY MEDICINE
Payer: MEDICARE

## 2022-04-15 DIAGNOSIS — R42 VERTIGO: Primary | ICD-10-CM

## 2022-04-15 LAB
ALBUMIN SERPL-MCNC: 3.5 G/DL (ref 3.5–5)
ALBUMIN/GLOB SERPL: 1 {RATIO} (ref 1.1–2.2)
ALP SERPL-CCNC: 87 U/L (ref 45–117)
ALT SERPL-CCNC: 25 U/L (ref 12–78)
ANION GAP SERPL CALC-SCNC: 13 MMOL/L (ref 5–15)
AST SERPL-CCNC: 18 U/L (ref 15–37)
BASOPHILS # BLD: 0 K/UL (ref 0–0.1)
BASOPHILS NFR BLD: 0 % (ref 0–1)
BILIRUB SERPL-MCNC: 0.4 MG/DL (ref 0.2–1)
BUN SERPL-MCNC: 13 MG/DL (ref 6–20)
BUN/CREAT SERPL: 14 (ref 12–20)
CALCIUM SERPL-MCNC: 8.9 MG/DL (ref 8.5–10.1)
CHLORIDE SERPL-SCNC: 102 MMOL/L (ref 97–108)
CO2 SERPL-SCNC: 24 MMOL/L (ref 21–32)
COMMENT, HOLDF: NORMAL
CREAT SERPL-MCNC: 0.9 MG/DL (ref 0.55–1.02)
DIFFERENTIAL METHOD BLD: ABNORMAL
EOSINOPHIL # BLD: 0.1 K/UL (ref 0–0.4)
EOSINOPHIL NFR BLD: 1 % (ref 0–7)
ERYTHROCYTE [DISTWIDTH] IN BLOOD BY AUTOMATED COUNT: 14.5 % (ref 11.5–14.5)
GLOBULIN SER CALC-MCNC: 3.5 G/DL (ref 2–4)
GLUCOSE BLD STRIP.AUTO-MCNC: 126 MG/DL (ref 65–117)
GLUCOSE SERPL-MCNC: 143 MG/DL (ref 65–100)
HCT VFR BLD AUTO: 37.6 % (ref 35–47)
HGB BLD-MCNC: 12.4 G/DL (ref 11.5–16)
IMM GRANULOCYTES # BLD AUTO: 0 K/UL (ref 0–0.04)
IMM GRANULOCYTES NFR BLD AUTO: 0 % (ref 0–0.5)
INR PPP: 0.9 (ref 0.9–1.1)
LYMPHOCYTES # BLD: 4 K/UL (ref 0.8–3.5)
LYMPHOCYTES NFR BLD: 44 % (ref 12–49)
MCH RBC QN AUTO: 28.2 PG (ref 26–34)
MCHC RBC AUTO-ENTMCNC: 33 G/DL (ref 30–36.5)
MCV RBC AUTO: 85.6 FL (ref 80–99)
MONOCYTES # BLD: 0.7 K/UL (ref 0–1)
MONOCYTES NFR BLD: 8 % (ref 5–13)
NEUTS SEG # BLD: 4.2 K/UL (ref 1.8–8)
NEUTS SEG NFR BLD: 47 % (ref 32–75)
NRBC # BLD: 0 K/UL (ref 0–0.01)
NRBC BLD-RTO: 0 PER 100 WBC
PLATELET # BLD AUTO: 244 K/UL (ref 150–400)
PMV BLD AUTO: 9.5 FL (ref 8.9–12.9)
POTASSIUM SERPL-SCNC: 3.4 MMOL/L (ref 3.5–5.1)
PROT SERPL-MCNC: 7 G/DL (ref 6.4–8.2)
PROTHROMBIN TIME: 9.3 SEC (ref 9–11.1)
RBC # BLD AUTO: 4.39 M/UL (ref 3.8–5.2)
SAMPLES BEING HELD,HOLD: NORMAL
SERVICE CMNT-IMP: ABNORMAL
SODIUM SERPL-SCNC: 139 MMOL/L (ref 136–145)
WBC # BLD AUTO: 9 K/UL (ref 3.6–11)

## 2022-04-15 PROCEDURE — 99285 EMERGENCY DEPT VISIT HI MDM: CPT

## 2022-04-15 PROCEDURE — 70496 CT ANGIOGRAPHY HEAD: CPT

## 2022-04-15 PROCEDURE — 74011000636 HC RX REV CODE- 636: Performed by: EMERGENCY MEDICINE

## 2022-04-15 PROCEDURE — 74011250637 HC RX REV CODE- 250/637: Performed by: EMERGENCY MEDICINE

## 2022-04-15 PROCEDURE — G0378 HOSPITAL OBSERVATION PER HR: HCPCS

## 2022-04-15 PROCEDURE — 82962 GLUCOSE BLOOD TEST: CPT

## 2022-04-15 PROCEDURE — 85610 PROTHROMBIN TIME: CPT

## 2022-04-15 PROCEDURE — 96374 THER/PROPH/DIAG INJ IV PUSH: CPT

## 2022-04-15 PROCEDURE — 96361 HYDRATE IV INFUSION ADD-ON: CPT

## 2022-04-15 PROCEDURE — 70450 CT HEAD/BRAIN W/O DYE: CPT

## 2022-04-15 PROCEDURE — 80053 COMPREHEN METABOLIC PANEL: CPT

## 2022-04-15 PROCEDURE — 94762 N-INVAS EAR/PLS OXIMTRY CONT: CPT

## 2022-04-15 PROCEDURE — 74011250636 HC RX REV CODE- 250/636: Performed by: EMERGENCY MEDICINE

## 2022-04-15 PROCEDURE — 74011000250 HC RX REV CODE- 250: Performed by: EMERGENCY MEDICINE

## 2022-04-15 PROCEDURE — 36415 COLL VENOUS BLD VENIPUNCTURE: CPT

## 2022-04-15 PROCEDURE — 85025 COMPLETE CBC W/AUTO DIFF WBC: CPT

## 2022-04-15 PROCEDURE — 93005 ELECTROCARDIOGRAM TRACING: CPT

## 2022-04-15 PROCEDURE — 70551 MRI BRAIN STEM W/O DYE: CPT

## 2022-04-15 PROCEDURE — 96375 TX/PRO/DX INJ NEW DRUG ADDON: CPT

## 2022-04-15 RX ORDER — ASPIRIN 300 MG/1
300 SUPPOSITORY RECTAL DAILY
Status: DISCONTINUED | OUTPATIENT
Start: 2022-04-16 | End: 2022-04-15 | Stop reason: SDUPTHER

## 2022-04-15 RX ORDER — MECLIZINE HYDROCHLORIDE 25 MG/1
25 TABLET ORAL
Status: DISCONTINUED | OUTPATIENT
Start: 2022-04-15 | End: 2022-04-16 | Stop reason: HOSPADM

## 2022-04-15 RX ORDER — SODIUM CHLORIDE 900 MG/100ML
50 INJECTION INTRAVENOUS
Status: DISCONTINUED | OUTPATIENT
Start: 2022-04-15 | End: 2022-04-16

## 2022-04-15 RX ORDER — MECLIZINE HYDROCHLORIDE 25 MG/1
50 TABLET ORAL
Status: COMPLETED | OUTPATIENT
Start: 2022-04-15 | End: 2022-04-15

## 2022-04-15 RX ORDER — MECLIZINE HYDROCHLORIDE 25 MG/1
25 TABLET ORAL
Qty: 30 TABLET | Refills: 0 | Status: SHIPPED | OUTPATIENT
Start: 2022-04-15 | End: 2022-04-16 | Stop reason: SDUPTHER

## 2022-04-15 RX ORDER — ONDANSETRON 2 MG/ML
4 INJECTION INTRAMUSCULAR; INTRAVENOUS
Status: DISCONTINUED | OUTPATIENT
Start: 2022-04-15 | End: 2022-04-16

## 2022-04-15 RX ORDER — DIAZEPAM 5 MG/1
5 TABLET ORAL
Status: COMPLETED | OUTPATIENT
Start: 2022-04-15 | End: 2022-04-15

## 2022-04-15 RX ORDER — GUAIFENESIN 100 MG/5ML
324 LIQUID (ML) ORAL
Status: DISCONTINUED | OUTPATIENT
Start: 2022-04-15 | End: 2022-04-15

## 2022-04-15 RX ORDER — DIPHENHYDRAMINE HYDROCHLORIDE 50 MG/ML
50 INJECTION, SOLUTION INTRAMUSCULAR; INTRAVENOUS
Status: COMPLETED | OUTPATIENT
Start: 2022-04-15 | End: 2022-04-15

## 2022-04-15 RX ORDER — ACETAMINOPHEN 325 MG/1
650 TABLET ORAL
Status: DISCONTINUED | OUTPATIENT
Start: 2022-04-15 | End: 2022-04-16 | Stop reason: SDUPTHER

## 2022-04-15 RX ORDER — PROMETHAZINE HYDROCHLORIDE 25 MG/ML
6.25 INJECTION, SOLUTION INTRAMUSCULAR; INTRAVENOUS
Status: DISCONTINUED | OUTPATIENT
Start: 2022-04-15 | End: 2022-04-16

## 2022-04-15 RX ORDER — ONDANSETRON 2 MG/ML
4 INJECTION INTRAMUSCULAR; INTRAVENOUS
Status: COMPLETED | OUTPATIENT
Start: 2022-04-15 | End: 2022-04-15

## 2022-04-15 RX ORDER — ASPIRIN 300 MG/1
300 SUPPOSITORY RECTAL
Status: DISCONTINUED | OUTPATIENT
Start: 2022-04-15 | End: 2022-04-15

## 2022-04-15 RX ORDER — SODIUM CHLORIDE 0.9 % (FLUSH) 0.9 %
5-10 SYRINGE (ML) INJECTION ONCE
Status: DISPENSED | OUTPATIENT
Start: 2022-04-15 | End: 2022-04-16

## 2022-04-15 RX ORDER — SODIUM CHLORIDE 9 MG/ML
75 INJECTION, SOLUTION INTRAVENOUS CONTINUOUS
Status: DISCONTINUED | OUTPATIENT
Start: 2022-04-15 | End: 2022-04-16 | Stop reason: HOSPADM

## 2022-04-15 RX ORDER — ONDANSETRON 4 MG/1
4 TABLET, ORALLY DISINTEGRATING ORAL
Status: DISCONTINUED | OUTPATIENT
Start: 2022-04-15 | End: 2022-04-16 | Stop reason: HOSPADM

## 2022-04-15 RX ORDER — DIAZEPAM 5 MG/1
5 TABLET ORAL
Status: DISCONTINUED | OUTPATIENT
Start: 2022-04-15 | End: 2022-04-15

## 2022-04-15 RX ADMIN — ONDANSETRON 4 MG: 2 INJECTION INTRAMUSCULAR; INTRAVENOUS at 13:53

## 2022-04-15 RX ADMIN — ONDANSETRON 4 MG: 2 INJECTION INTRAMUSCULAR; INTRAVENOUS at 20:19

## 2022-04-15 RX ADMIN — SODIUM CHLORIDE 1000 ML: 9 INJECTION, SOLUTION INTRAVENOUS at 13:50

## 2022-04-15 RX ADMIN — PROCHLORPERAZINE EDISYLATE 10 MG: 5 INJECTION INTRAMUSCULAR; INTRAVENOUS at 14:23

## 2022-04-15 RX ADMIN — DIAZEPAM 5 MG: 5 TABLET ORAL at 16:03

## 2022-04-15 RX ADMIN — IOPAMIDOL 100 ML: 755 INJECTION, SOLUTION INTRAVENOUS at 14:45

## 2022-04-15 RX ADMIN — MECLIZINE HYDROCHLORIDE 50 MG: 25 TABLET ORAL at 17:38

## 2022-04-15 RX ADMIN — SODIUM CHLORIDE 75 ML/HR: 9 INJECTION, SOLUTION INTRAVENOUS at 20:24

## 2022-04-15 RX ADMIN — DIPHENHYDRAMINE HYDROCHLORIDE 50 MG: 50 INJECTION, SOLUTION INTRAMUSCULAR; INTRAVENOUS at 14:20

## 2022-04-15 NOTE — ED NOTES
Pt family Indy Orourke [facility]) updated on admission per pt wishes, would like to be called tomorrow on how she is doing.

## 2022-04-15 NOTE — ED PROVIDER NOTES
60-year-old female with history of ovarian CA, carcinomatosis, GERD, hypertension, hypercholesterolemia presents with a chief complaint of dizziness. Patient denies history of similar in the past.  Her symptoms started around 1230. She has had several episodes of vomiting. She denies chest pain, abdominal pain, shortness of breath, GI or urinary symptoms. Patient also reports intermittent left-sided headaches since last October. Past Medical History:   Diagnosis Date    Arthritis     Cancer ([facility] [address]     ovarian    Carcinomatosis ([facility] [address] 07/03    Cat scratch fever 09/91    Cervical prolapse 2011    Grade II    Chicken pox 1962    Contact dermatitis and other eczema, due to unspecified cause     Cystocele 2011    grade I    Diverticulosis     1985    Duodenal ulcer     Eye disorder 06/95    recurrent erosion of L eye    GERD (gastroesophageal reflux disease)     [place] measles 1964    Hypercholesterolemia     Hypertension     Menarche     onset at age 16    Menopause     onset at age 47    Neuropathy     Ovarian cancer ([facility] [address] 07/03    Poorly diff.     Postmenopausal vaginal bleeding 9/19/2013    Due to Granulation tissue from her pessary, treated w/ silver nitrate stick on 9.19.2013    Presence of pessary     #2    PARUL (stress urinary incontinence, female)        Past Surgical History:   Procedure Laterality Date    HX APPENDECTOMY      HX DILATION AND CURETTAGE  1963    colonization    HX SALPINGO-OOPHORECTOMY  7/03    Exploratory Lap, omentectomy, tumor debulking Clair Clarke)    HX TONSILLECTOMY  1964    HX TOTAL COLECTOMY      Angel Muñoz)         Family History:   Problem Relation Age of Onset    Stroke Mother     Heart Disease Father        Social History     Socioeconomic History    Marital status: SINGLE     Spouse name: Not on file    Number of children: 0    Years of education: Not on file    Highest education level: Not on file   Occupational History    Occupation: front office, coding     Employer: RETIRED     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, [employer]   Tobacco Use    Smoking status: Former Smoker    Smokeless tobacco: Never Used   Vaping Use    Vaping Use: Never used   Substance and Sexual Activity    Alcohol use: No     Alcohol/week: 0.0 standard drinks     Comment: minimal    Drug use: No    Sexual activity: Not Currently   Other Topics Concern     Service No    Blood Transfusions No    Caffeine Concern No    Occupational Exposure No    Hobby Hazards No    Sleep Concern No    Stress Concern No    Weight Concern No    Special Diet No    Back Care No    Exercise No    Bike Helmet No    Seat Belt Yes    Self-Exams No   Social History Narrative    Not on file     Social Determinants of Health     Financial Resource Strain:     Difficulty of Paying Living Expenses: Not on file   Food Insecurity:     Worried About Running Out of Food in the Last Year: Not on file    Fabienne of Food in the Last Year: Not on file   Transportation Needs:     Lack of Transportation (Medical): Not on file    Lack of Transportation (Non-Medical):  Not on file   Physical Activity:     Days of Exercise per Week: Not on file    Minutes of Exercise per Session: Not on file   Stress:     Feeling of Stress : Not on file   Social Connections:     Frequency of Communication with Friends and Family: Not on file    Frequency of Social Gatherings with Friends and Family: Not on file    Attends Nondenominational Services: Not on file    Active Member of Clubs or Organizations: Not on file    Attends Club or Organization Meetings: Not on file    Marital Status: Not on file   Intimate Partner Violence:     Fear of Current or Ex-Partner: Not on file    Emotionally Abused: Not on file    Physically Abused: Not on file    Sexually Abused: Not on file   Housing Stability:     Unable to Pay for Housing in the Last Year: Not on file    Number of Places Lived in the Last Year: Not on file    Unstable Housing in the Last Year: Not on file         ALLERGIES: Feldene [piroxicam], Toradol [ketorolac tromethamine], and Voltaren [diclofenac sodium]    Review of Systems   Unable to perform ROS: Acuity of condition       There were no vitals filed for this visit. Physical Exam  Vitals and nursing note reviewed. Constitutional:       General: She is not in acute distress. Appearance: Normal appearance. She is not ill-appearing, toxic-appearing or diaphoretic. HENT:      Head: Normocephalic and atraumatic. Eyes:      Extraocular Movements: Extraocular movements intact. Cardiovascular:      Rate and Rhythm: Normal rate. Pulses: Normal pulses. Heart sounds: Normal heart sounds. Pulmonary:      Effort: Pulmonary effort is normal. No respiratory distress. Breath sounds: Normal breath sounds. Abdominal:      General: There is no distension. Musculoskeletal:         General: Normal range of motion. Cervical back: Normal range of motion. Skin:     General: Skin is warm and dry. Neurological:      Mental Status: She is alert and oriented to person, place, and time. Comments: Leftward gaze rotary nystagmus   Psychiatric:         Mood and Affect: Mood normal.          MDM  Number of Diagnoses or Management Options  Vertigo  Diagnosis management comments:     Patient presents with dizziness concerning for vertigo versus posterior circulation pathology including stroke. Level One stroke was activated. Patient evaluated by teleneurology. CT imaging negative. MRI obtained and shows no acute abnormality. Patient treated for vertigo with antiemetics and benzodiazepines. Patient unable to ambulate in the ED due to intractable dizziness and vertigo. Will admit to hospital medicine. Discussed with Dr. Daryl Jackson.     EKG shows sinus rhythm at a rate of 86, normal intervals, normal axis, PVCs, no ischemic changes    Total critical care time spent exclusive of procedures: 37 minutes         Amount and/or Complexity of Data Reviewed  Clinical lab tests: ordered and reviewed  Tests in the radiology section of CPT®: ordered and reviewed           Procedures

## 2022-04-15 NOTE — ED NOTES
Per ED Provider, ASA not needed for pt at this time. Pt to have diazepam then have vertigo symptoms reassessed.

## 2022-04-15 NOTE — ED NOTES
Pt to be admitted for vertigo that is not responding to treatments provided in the ED, negative head CTA, MRI.

## 2022-04-15 NOTE — ED NOTES
TRANSFER - OUT REPORT:    Verbal report given to Tico VÁZQUEZ(name) on Ebony Govea  being transferred to [facility] Bed 116(unit) for routine progression of care       Report consisted of patients Situation, Background, Assessment and   Recommendations(SBAR). Information from the following report(s) SBAR, ED Summary, Intake/Output, MAR, Accordion, Recent Results, Med Rec Status and Quality Measures was reviewed with the receiving nurse. Lines:   Peripheral IV 04/15/22 Left Antecubital (Active)        Opportunity for questions and clarification was provided. Patient transported with:   This RN to floor

## 2022-04-15 NOTE — ED NOTES
Pt unable to tolerate getting up or using bedside commode at this time. Pt being assisted with bedpan at this time.

## 2022-04-15 NOTE — ED NOTES
Pt family Tim Canales updated per pt wishes on pt status, plan of care via phone. Tim Canales has left cell phone number to be reached [phone]. Available to drive pt home if needed.

## 2022-04-15 NOTE — ED NOTES
Received assignment, going to CT with pt. Pt states 'I don't feel well', non-descript, does not tolerate movement well. Denies nausea.

## 2022-04-15 NOTE — ED NOTES
Unable to sit pt up, pt canting to left, does not tolerate sitting up, being straight in bed, moved. Pt states it feels like her vision is skewed laterally per pt words.

## 2022-04-15 NOTE — DISCHARGE INSTRUCTIONS
HOSPITALIST RECOMMENDATIONS  Maintain all regular medications  Rx Antivert 25mg tid prn vertigo #20/0  Make head position changes slowly  KRISTY REYNAGA MD   [phone]        Thank you for allowing us to provide you with medical care today. We realize that you have many choices for your emergency care needs. We thank you for choosing [facility]. Please choose us in the future for any continued health care needs. The exam and treatment you received in the Emergency Department were for an emergent problem and are not intended as complete care. It is important that you follow up with a doctor, nurse practitioner, or physician's assistant for ongoing care. If your symptoms worsen or you do not improve as expected and you are unable to reach your usual health care provider, you should return to the Emergency Department. We are available 24 hours a day. Please make an appointment with your health care provider(s) for follow up of your Emergency Department visit. Take this sheet with you when you go to your follow-up visit.

## 2022-04-15 NOTE — ED NOTES
Pt states she is unable walk, is still having visual disturbances with nausea if she moves at all or opens her eyes. ED Provider informed.

## 2022-04-15 NOTE — ED NOTES
Pt states there is no way she can walk at this time, vision is still distorted and she is feeling nauseated and uncomfortable. Pt holding green bag.

## 2022-04-16 VITALS
DIASTOLIC BLOOD PRESSURE: 65 MMHG | TEMPERATURE: 98.2 F | WEIGHT: 135 LBS | HEART RATE: 67 BPM | BODY MASS INDEX: 23.92 KG/M2 | SYSTOLIC BLOOD PRESSURE: 142 MMHG | OXYGEN SATURATION: 98 % | HEIGHT: 63 IN | RESPIRATION RATE: 20 BRPM

## 2022-04-16 PROCEDURE — 96372 THER/PROPH/DIAG INJ SC/IM: CPT

## 2022-04-16 PROCEDURE — 94762 N-INVAS EAR/PLS OXIMTRY CONT: CPT

## 2022-04-16 PROCEDURE — 94760 N-INVAS EAR/PLS OXIMETRY 1: CPT

## 2022-04-16 PROCEDURE — 74011250636 HC RX REV CODE- 250/636: Performed by: INTERNAL MEDICINE

## 2022-04-16 PROCEDURE — 74011250637 HC RX REV CODE- 250/637: Performed by: EMERGENCY MEDICINE

## 2022-04-16 PROCEDURE — 74011250637 HC RX REV CODE- 250/637: Performed by: INTERNAL MEDICINE

## 2022-04-16 PROCEDURE — 74011250636 HC RX REV CODE- 250/636: Performed by: EMERGENCY MEDICINE

## 2022-04-16 PROCEDURE — G0378 HOSPITAL OBSERVATION PER HR: HCPCS

## 2022-04-16 RX ORDER — POLYETHYLENE GLYCOL 3350 17 G/17G
17 POWDER, FOR SOLUTION ORAL DAILY PRN
Status: DISCONTINUED | OUTPATIENT
Start: 2022-04-16 | End: 2022-04-16 | Stop reason: HOSPADM

## 2022-04-16 RX ORDER — MECLIZINE HYDROCHLORIDE 25 MG/1
25 TABLET ORAL
Qty: 20 TABLET | Refills: 0 | Status: SHIPPED | OUTPATIENT
Start: 2022-04-16 | End: 2022-04-26

## 2022-04-16 RX ORDER — ACETAMINOPHEN 650 MG/1
650 SUPPOSITORY RECTAL
Status: DISCONTINUED | OUTPATIENT
Start: 2022-04-16 | End: 2022-04-16 | Stop reason: HOSPADM

## 2022-04-16 RX ORDER — PROMETHAZINE HYDROCHLORIDE 25 MG/ML
6.25 INJECTION, SOLUTION INTRAMUSCULAR; INTRAVENOUS
Status: DISCONTINUED | OUTPATIENT
Start: 2022-04-16 | End: 2022-04-16

## 2022-04-16 RX ORDER — CHOLESTYRAMINE 4 G/4.8G
4 POWDER, FOR SUSPENSION ORAL
Status: DISCONTINUED | OUTPATIENT
Start: 2022-04-16 | End: 2022-04-16 | Stop reason: HOSPADM

## 2022-04-16 RX ORDER — LOSARTAN POTASSIUM 100 MG/1
100 TABLET ORAL DAILY
Status: DISCONTINUED | OUTPATIENT
Start: 2022-04-16 | End: 2022-04-16 | Stop reason: HOSPADM

## 2022-04-16 RX ORDER — ENOXAPARIN SODIUM 100 MG/ML
40 INJECTION SUBCUTANEOUS DAILY
Status: DISCONTINUED | OUTPATIENT
Start: 2022-04-16 | End: 2022-04-16 | Stop reason: HOSPADM

## 2022-04-16 RX ORDER — METOPROLOL SUCCINATE 50 MG/1
50 TABLET, EXTENDED RELEASE ORAL DAILY
Status: DISCONTINUED | OUTPATIENT
Start: 2022-04-16 | End: 2022-04-16 | Stop reason: HOSPADM

## 2022-04-16 RX ORDER — PROMETHAZINE HYDROCHLORIDE 25 MG/1
25 SUPPOSITORY RECTAL
Status: DISCONTINUED | OUTPATIENT
Start: 2022-04-16 | End: 2022-04-16

## 2022-04-16 RX ORDER — SODIUM CHLORIDE 0.9 % (FLUSH) 0.9 %
5-40 SYRINGE (ML) INJECTION AS NEEDED
Status: DISCONTINUED | OUTPATIENT
Start: 2022-04-16 | End: 2022-04-16 | Stop reason: HOSPADM

## 2022-04-16 RX ORDER — ALPRAZOLAM 0.25 MG/1
0.12 TABLET ORAL
COMMUNITY

## 2022-04-16 RX ORDER — NIFEDIPINE 30 MG/1
60 TABLET, EXTENDED RELEASE ORAL DAILY
Status: DISCONTINUED | OUTPATIENT
Start: 2022-04-16 | End: 2022-04-16 | Stop reason: HOSPADM

## 2022-04-16 RX ORDER — ACETAMINOPHEN 325 MG/1
650 TABLET ORAL
Status: DISCONTINUED | OUTPATIENT
Start: 2022-04-16 | End: 2022-04-16 | Stop reason: HOSPADM

## 2022-04-16 RX ORDER — ACETAMINOPHEN 500 MG
1000 TABLET ORAL
COMMUNITY

## 2022-04-16 RX ORDER — PANTOPRAZOLE SODIUM 40 MG/1
40 TABLET, DELAYED RELEASE ORAL
Status: DISCONTINUED | OUTPATIENT
Start: 2022-04-16 | End: 2022-04-16 | Stop reason: HOSPADM

## 2022-04-16 RX ORDER — SODIUM CHLORIDE 0.9 % (FLUSH) 0.9 %
5-40 SYRINGE (ML) INJECTION EVERY 8 HOURS
Status: DISCONTINUED | OUTPATIENT
Start: 2022-04-16 | End: 2022-04-16 | Stop reason: HOSPADM

## 2022-04-16 RX ORDER — PROMETHAZINE HYDROCHLORIDE 12.5 MG/1
12.5 SUPPOSITORY RECTAL
Status: DISCONTINUED | OUTPATIENT
Start: 2022-04-16 | End: 2022-04-16

## 2022-04-16 RX ADMIN — METOPROLOL SUCCINATE 50 MG: 50 TABLET, EXTENDED RELEASE ORAL at 09:15

## 2022-04-16 RX ADMIN — PANTOPRAZOLE SODIUM 40 MG: 40 TABLET, DELAYED RELEASE ORAL at 09:15

## 2022-04-16 RX ADMIN — MECLIZINE HYDROCHLORIDE 25 MG: 25 TABLET ORAL at 01:16

## 2022-04-16 RX ADMIN — NIFEDIPINE 60 MG: 30 TABLET, FILM COATED, EXTENDED RELEASE ORAL at 09:15

## 2022-04-16 RX ADMIN — LOSARTAN POTASSIUM 100 MG: 100 TABLET, FILM COATED ORAL at 09:15

## 2022-04-16 RX ADMIN — ACETAMINOPHEN 650 MG: 325 TABLET ORAL at 01:17

## 2022-04-16 RX ADMIN — ENOXAPARIN SODIUM 40 MG: 40 INJECTION SUBCUTANEOUS at 09:15

## 2022-04-16 RX ADMIN — ACETAMINOPHEN 650 MG: 325 TABLET ORAL at 16:54

## 2022-04-16 NOTE — DISCHARGE SUMMARY
[facility]  Hospitalist Discharge Summary    Patient ID:  Aki Cleveland  [id number]  [de-identified] y.o.  1940    PCP on record: Randa Lezama MD    Admit date: 4/15/2022  Discharge date and time: 4/16/2022     DISCHARGE DIAGNOSIS:    Principal Problem:    Vertigo (4/15/2022)    Active Problems:    Esophageal reflux (8/20/2013)    History of ovarian cancer (9/15/2014)    Essential hypertension (8/20/2013)    Hyperlipemia (8/20/2013)    CONSULTATIONS:  None    Excerpted HPI from H&P of Eric Hanna MD:  [de-identified] y.o. female presenting for admission to [facility] for further evaluation and treatment for Vertigo. She  has a past medical history of Arthritis, Cancer ([address] Carcinomatosis ([address] (07/03), Cat scratch fever (09/91), Cervical prolapse (2011), Chicken pox (1962), Contact dermatitis and other eczema, due to unspecified cause, Cystocele (2011), Diverticulosis, Duodenal ulcer, Eye disorder (06/95), GERD (gastroesophageal reflux disease), [place] measles (1964), Hypercholesterolemia, Hypertension, Menarche, Menopause, Neuropathy, Ovarian cancer ([address] (07/03), Postmenopausal vaginal bleeding (9/19/2013), Presence of pessary, and PARUL (stress urinary incontinence, female). She has no past medical history of Abuse, Anemia, Arrhythmia, Asthma, Autoimmune disease ([address] CAD (coronary artery disease), Calculus of kidney, Chronic kidney disease, Chronic obstructive pulmonary disease ([address] Chronic pain, Congestive heart failure, unspecified, Depression, Diabetes ([address] Headache(784.0), Liver disease, Psychotic disorder ([address] PUD (peptic ulcer disease), Seizures ([address] Stroke ([address] Thromboembolus ([address] Thyroid disease, or Trauma. .     25-year-old female presenting to the ED via EMS with complaints of dizziness. She awoke feeling fine this morning. Symptoms began fairly abruptly about 12:30 PM after a normal morning. She had dizziness as well as several episodes of vomiting.   She denies any chest pain, shortness of breath, abdominal pain. She denied any urinary symptoms. She has a history of intermittent left-sided headaches. In retrospect yesterday she did appreciate some brief transient lightheaded episodes.     On assessment in the ED she was observed to have circular nystagmus. A teleneuro consult was obtained. Symptoms suggested benign positional vertigo and she was given symptomatic treatment with oral Antivert, Ativan, Phenergan, Zofran. She symptomatically improved at rest but with head rotation or standing up she had recurrent severe symptoms. Patient does live alone. MRI of the scan was obtained without significant finding. She is admitted to [facility]  for stabilization of symptoms prior to discharge home.    ______________________________________________________________________  DISCHARGE SUMMARY/HOSPITAL COURSE:  for full details see H&P, daily progress notes, labs, consult notes. Principal Problem:    Vertigo (4/15/2022)  Patient presenting with vertigosymptomatic with dizziness, spinning sensation, nausea and vomiting. Treated in the ED with IV fluids, Meclizine, Phenergan, Zofran, Ativan without relief of symptoms  Remain symptomatic when attempting to sit up or stand. She was admitted for continued symptom treatment.   MRI in the ED was remarkable only for signs of an old meningiomalikely not related to the presenting symptoms  Pt was feeling better / normal by the time of my exam / interview the following morning  Activity was advanced w/o problems  D/c to cont prn Antivert as needed for symptoms     Active Problems:    Esophageal reflux (8/20/2013)  Maintain PPI treatment with Protonix  Symptoms stable       History of ovarian cancer (9/15/2014)  Postop approximately 20 years ago  Continues using cholestyramine for chronic diarrhea up to 4 times daily       Essential hypertension (8/20/2013)  Maintain current treatment with Procardia XL, Toprol XL, Cozaar  Hold diureticAldactone for now       Hyperlipemia (8/20/2013)  She will resume Crestor on discharge   _______________________________________________________________________  Patient seen and examined by me on discharge day. Pertinent Findings:  Visit Vitals  BP (!) 142/65 (BP 1 Location: Right upper arm, BP Patient Position: At rest)   Pulse 67   Temp 98.2 °F (36.8 °C)   Resp 20   Ht 5' 3\" (1.6 m)   Wt 61.2 kg (135 lb)   SpO2 98%   Breastfeeding Yes   BMI 23.91 kg/m²     Gen:    Not in distress  Chest: Nonlabored respiration, Clear lungs  CVS:   Regular rhythm. No edema  Abd:  Soft, not distended, not tender  Neuro:  Alert, nonfocal, nonfocal    LABS:  Results for Brooke Hernández (MRN [id number]) as of 4/16/2022 17:49   4/15/2022 13:34   WBC 9.0   NRBC 0.0   RBC 4.39   HGB 12.4   HCT 37.6   MCV 85.6   MCH 28.2   MCHC 33.0   RDW 14.5   PLATELET 359   MPV 9.5   NEUTROPHILS 47   LYMPHOCYTE 44   MONOCYTES 8   EOSINOPHILS 1     Results for Brooke Hernández (MRN [id number]) as of 4/16/2022 17:49   4/15/2022 13:34   INR 0.9   Prot time 9.3     Results for Brooke Hernández (MRN [id number]) as of 4/16/2022 17:49   4/15/2022 13:34   Sodium 139   Potassium 3.4 (L)   Chloride 102   CO2 24   Anion gap 13   Glucose 143 (H)   BUN 13   Creatinine 0.90   BUN/Cr ratio 14   Calcium 8.9   GFR  non-AA >60   Bilirubin, total 0.4   Protein, total 7.0   Albumin 3.5   Globulin 3.5   A-G Ratio 1.0 (L)   ALT 25   AST 18   Alk. danette 87       EKG:  NSR 86 bpm, Occ PVC,  [place]     Radiology:  CT HEAD 4/15: The ventricles and sulci are normal in size, shape and configuration and  midline. There is no significant white matter disease. There is no intracranial  hemorrhage, extra-axial collection, mass, mass effect or midline shift.  The  basilar cisterns are open. No acute infarct is identified. The bone windows  demonstrate no abnormalities.  The visualized portions of the paranasal sinuses  and mastoid air cells are clear.   IMPRESSION: No acute intracranial process identified     CTA HEAD/NECK 4/15:  CTA Head:  1. No evidence of significant stenosis or aneurysm. 2. An 8 mm calcified meningioma in the parasagittal right occipital lobe.   CTA Neck:  1. No evidence of significant stenosis.     pCXR 4/15: none     MRI BRAIN 4/15: There is age-appropriate diffuse cortical atrophy. vThere are  bilateral periventricular white matter hypodensities consistent with chronic  microvascular ischemic changes. There is no restricted diffusion to suggest  acute infarct. Within the right parietal white matter, there is a subcentimeter  intensity on diffusion images but also demonstrates increased intensity on the  ADC map. The ventricular system is normal. The cervicomedullary junction is  normal and there is no tonsillar ectopia. There is no acute intracranial  hemorrhage, prior intracranial hemorrhage or extra-axial fluid collection. The  visualized vascular flow-voids of the skull base are normal. There is no  intracranial mass lesion, mass effect or herniation.   IMPRESSION: No acute intracranial hemorrhage or infarct.   ______________________________________________________________________  DISCHARGE MEDICATIONS:   Current Discharge Medication List      START taking these medications    Details   meclizine (ANTIVERT) 25 mg tablet Take 1 Tablet by mouth three (3) times daily as needed for Dizziness or Nausea for up to 10 days. Qty: 20 Tablet, Refills: 0  Start date: 4/16/2022, End date: 4/26/2022         CONTINUE these medications which have NOT CHANGED    Details   acetaminophen (Tylenol Extra Strength) 500 mg tablet Take 1,000 mg by mouth every six (6) hours as needed for Pain. ALPRAZolam (XANAX) 0.25 mg tablet Take 0.125 mg by mouth daily as needed for Anxiety (rarely uses).       spironolactone (ALDACTONE) 25 mg tablet TAKE 1/2 TABLET BY MOUTH DAILY  Qty: 45 Tablet, Refills: 3    Associated Diagnoses: Essential hypertension      metoprolol succinate (TOPROL-XL) 50 mg XL tablet Take 1 tablet by mouth once daily  Qty: 90 Tablet, Refills: 3    Associated Diagnoses: Essential hypertension      cholestyramine-sucrose 4 gram powder MIX 1 SCOOP (4 GRAMS TOTAL) WITH LIQUID AND DRINK TWICE DAILY  Qty: 3 Each, Refills: 3    Comments: Requesting 3 cans. Needs brands \"Sandoz or Par\". She has had side effects to other brands. Associated Diagnoses: Hyperlipidemia, unspecified hyperlipidemia type; Gastroesophageal reflux disease without esophagitis; Malignant neoplasm of ovary, unspecified laterality (HCC)      rosuvastatin (CRESTOR) 5 mg tablet Take 1 Tablet by mouth nightly. Indications: excessive fat in the blood  Qty: 90 Tablet, Refills: 3    Associated Diagnoses: Pure hypercholesterolemia      losartan (COZAAR) 100 mg tablet Take 1 Tablet by mouth daily for 360 days. Qty: 90 Tablet, Refills: 3    Associated Diagnoses: Essential hypertension      NIFEdipine ER (PROCARDIA XL) 60 mg ER tablet Take 1 Tablet by mouth daily.   Qty: 90 Tablet, Refills: 3    Associated Diagnoses: Essential hypertension      omeprazole (PRILOSEC) 20 mg capsule TAKE 1 CAPSULE BY MOUTH DAILY INDICATIONS:  PREVENTION OF NSAID-INDUCED GASTRIC ULCER  Qty: 90 Capsule, Refills: 3    Associated Diagnoses: Gastroesophageal reflux disease without esophagitis         STOP taking these medications       zolpidem (AMBIEN) 5 mg tablet Comments:   Reason for Stopping:               My Recommended  Diet: Cardiac  Activity: [place],now climbing or driving for the next week (till stable)  Wound Care: none  Follow-up labs: routine    HOSPITALIST RECOMMENDATIONS  Maintain all regular medications  Rx Antivert 25mg tid prn vertigo #20/0  Make head position changes slowly  KRISTY REYNAGA MD   [phone]  ______________________________________________________________________  DISPOSITION:    Home with Family: x   Home with HH/PT/OT/RN:    SNF/LTC:    PIA:    OTHER:        Condition at Discharge: Stable  _____________________________________________________________________  Follow up with:   PCP : Ford Norris MD  Follow-up Information     Follow up With Specialties Details Why Contact Info    Ford Norris MD Family Medicine Schedule an appointment as soon as possible for a visit  [phone]      [facility]   As needed [address] [address] [address]    [address]  Emergency Medicine  As needed, If symptoms worsen [address] [address]  [phone]    Ford Norris MD Family Medicine In 5 days  [address] [place] [address]  [phone]          Total time in minutes spent coordinating this discharge (includes going over instructions, follow-up, prescriptions, and preparing report for sign off to her PCP) :35 minutes    Signed:  Rosemarie Cohn MD  [facility] Hospitalist  [phone]

## 2022-04-16 NOTE — PROGRESS NOTES
Bedside and Verbal shift change report given to LYNETTE Diaz (oncoming nurse) by Davion Carl RN   (offgoing nurse). Report included the following information SBAR and Kardex.

## 2022-04-16 NOTE — PROGRESS NOTES
2010 Pt arrived from ED vomiting. 2019 IV Zofran administered. 2130 Pt vomiting again [facility] MD for other   medication for treatment. Tegegn on call.

## 2022-04-16 NOTE — PROGRESS NOTES
Problem: Falls - Risk of  Goal: *Absence of Falls  Description: Document Ford Wharton Fall Risk and appropriate interventions in the flowsheet. Outcome: Resolved/Met  Note: Fall Risk Interventions:  Mobility Interventions: Bed/chair exit alarm,Patient to call before getting OOB         Medication Interventions: Bed/chair exit alarm,Patient to call before getting OOB    Elimination Interventions: Call light in reach,Bed/chair exit alarm              Problem: Patient Education: Go to Patient Education Activity  Goal: Patient/Family Education  Outcome: Resolved/Met     Problem: Pressure Injury - Risk of  Goal: *Prevention of pressure injury  Description: Document Ben Scale and appropriate interventions in the flowsheet. Outcome: Resolved/Met  Note: Pressure Injury Interventions:  Sensory Interventions: Assess changes in LOC,Minimize linen layers,Maintain/enhance activity level,Keep linens dry and wrinkle-free    Moisture Interventions: Absorbent underpads,Apply protective barrier, creams and emollients    Activity Interventions: Increase time out of bed    Mobility Interventions: Turn and reposition approx.  every two hours(pillow and wedges)    Nutrition Interventions: Document food/fluid/supplement intake                     Problem: Patient Education: Go to Patient Education Activity  Goal: Patient/Family Education  Outcome: Resolved/Met

## 2022-04-16 NOTE — ED NOTES
Notified that patient was admitted to Dr Lance Kent by ED provider. This is because when the ADT order was placed he was accidentally placed under his name instead of the admitting hospitalist Dr. Ryan Ng. I have discontinued the original ADT order from yesterday at 6:49 PM and placed a new order for admission to Dr. Ryan Ng.   Lennie Wong MD

## 2022-04-18 LAB
ATRIAL RATE: 86 BPM
CALCULATED P AXIS, ECG09: 66 DEGREES
CALCULATED R AXIS, ECG10: -10 DEGREES
CALCULATED T AXIS, ECG11: 54 DEGREES
DIAGNOSIS, 93000: NORMAL
P-R INTERVAL, ECG05: 184 MS
Q-T INTERVAL, ECG07: 396 MS
QRS DURATION, ECG06: 100 MS
QTC CALCULATION (BEZET), ECG08: 473 MS
VENTRICULAR RATE, ECG03: 86 BPM

## 2022-04-19 ENCOUNTER — TELEPHONE (OUTPATIENT)
Dept: FAMILY MEDICINE CLINIC | Age: 82
End: 2022-04-19

## 2022-04-19 NOTE — TELEPHONE ENCOUNTER
Rosio Wiseman has been contacted regarding recent hospital admission. Current medications were reviewed with the patient/caregiver by telephone. Date of Admission:  4/15/2022     Date of Discharge: 4/16/2022     Facility patient was discharged from: [facility]     Reason for Admission: vertigo   Any medication changes? Yes, has PRN for meclizine not used     How is the patient feeling? Much better     Does the patient have any questions or problems? No    Does the patient need transportation? No   Follow-up Appointment date: 4/27/22       I advised the patient to bring her medications in the original bottles and to contact office, or go to either urgent care or emergency care if symptoms return before scheduled appointment.     Sanam Díaz 4/19/2022 11:39 AM

## 2022-04-27 ENCOUNTER — OFFICE VISIT (OUTPATIENT)
Dept: FAMILY MEDICINE CLINIC | Age: 82
End: 2022-04-27
Payer: MEDICARE

## 2022-04-27 VITALS
WEIGHT: 137 LBS | BODY MASS INDEX: 24.27 KG/M2 | HEIGHT: 63 IN | DIASTOLIC BLOOD PRESSURE: 70 MMHG | HEART RATE: 71 BPM | SYSTOLIC BLOOD PRESSURE: 152 MMHG | TEMPERATURE: 97.3 F | RESPIRATION RATE: 20 BRPM | OXYGEN SATURATION: 99 %

## 2022-04-27 DIAGNOSIS — M17.12 PRIMARY OSTEOARTHRITIS OF LEFT KNEE: ICD-10-CM

## 2022-04-27 DIAGNOSIS — E78.00 PURE HYPERCHOLESTEROLEMIA: ICD-10-CM

## 2022-04-27 DIAGNOSIS — H81.10 BENIGN PAROXYSMAL POSITIONAL VERTIGO, UNSPECIFIED LATERALITY: Primary | ICD-10-CM

## 2022-04-27 DIAGNOSIS — I25.10 ASCVD (ARTERIOSCLEROTIC CARDIOVASCULAR DISEASE): ICD-10-CM

## 2022-04-27 DIAGNOSIS — M17.11 PRIMARY OSTEOARTHRITIS OF RIGHT KNEE: ICD-10-CM

## 2022-04-27 DIAGNOSIS — M47.814 SPONDYLOSIS OF THORACIC REGION WITHOUT MYELOPATHY OR RADICULOPATHY: ICD-10-CM

## 2022-04-27 DIAGNOSIS — Z09 HOSPITAL DISCHARGE FOLLOW-UP: ICD-10-CM

## 2022-04-27 PROBLEM — E87.1 HYPONATREMIA: Status: RESOLVED | Noted: 2021-10-31 | Resolved: 2022-04-27

## 2022-04-27 PROCEDURE — 99215 OFFICE O/P EST HI 40 MIN: CPT | Performed by: FAMILY MEDICINE

## 2022-04-27 RX ORDER — MELOXICAM 7.5 MG/1
7.5 TABLET ORAL DAILY
Qty: 30 TABLET | Refills: 4 | Status: SHIPPED | OUTPATIENT
Start: 2022-04-27

## 2022-04-27 NOTE — PROGRESS NOTES
Sonny Mathew is a [de-identified] y.o. female presenting for/with:    Chief Complaint   Patient presents with   [facility] ED Follow-up     ED 4/15/22 to 4/16/22 Vertigo       Visit Vitals  BP (!) 152/70   Pulse 71   Temp 97.3 °F (36.3 °C) (Temporal)   Resp 20   Ht 5' 3\" (1.6 m)   Wt 137 lb (62.1 kg)   SpO2 99%   BMI 24.27 kg/m²     Pain Scale: /10  Pain Location:     1. \"Have you been to the ER, urgent care clinic since your last visit? Hospitalized since your last visit? \" Yes Where: [facility]- Vertigo 4/15/22 to 4/16/22    2. \"Have you seen or consulted any other health care providers outside of the [address] [place] [address] since your last visit? \" No     3. For patients aged 39-70: Has the patient had a colonoscopy / FIT/ Cologuard? NA - based on age      If the patient is female:    4. For patients aged 41-77: Has the patient had a mammogram within the past 2 years? NA - based on age or sex      11. For patients aged 21-65: Has the patient had a pap smear?  NA - based on age or sex      Symptom review:  NO  Fever   NO  Shaking chills  NO  Cough  NO  Body aches  NO  Coughing up blood  NO  Chest congestion  NO  Chest pain  NO  Shortness of breath  NO  Profound Loss of smell/taste  NO  Nausea/Vomiting   NO  Loose stool/Diarrhea  NO  any skin issues    Patient Risk Factors Reviewed as follows:  NO  have you been in Close contact with confirmed COVID19 patient   NO  History of recent travel to affected geographical areas within the past 14 days  NO  COPD  NO  Active Cancer/Leukemia/Lymphoma/Chemotherapy  NO  Oral steroid use  NO  Pregnant  NO  Diabetes Mellitus  NO  Heart disease  NO  Asthma  NO Health care worker at home  NO Health care worker  NO Is there a Pregnant Woman in the home  NO Dialysis pt in the home   NO a large number of people living in the home    Learning Assessment 11/8/2021   PRIMARY LEARNER Patient   PRIMARY LANGUAGE ENGLISH   LEARNER PREFERENCE PRIMARY DEMONSTRATION     -   ANSWERED BY patient   RELATIONSHIP SELF     Fall Risk Assessment, last 12 mths 4/27/2022   Able to walk? Yes   Fall in past 12 months? 0   Do you feel unsteady? 0   Are you worried about falling 0   Is TUG test greater than 12 seconds? -   Is the gait abnormal? -   Number of falls in past 12 months -   Fall with injury? -       3 most recent PHQ Screens 4/27/2022   PHQ Not Done -   Little interest or pleasure in doing things Not at all   Feeling down, depressed, irritable, or hopeless Not at all   Total Score PHQ 2 0   Trouble falling or staying asleep, or sleeping too much -   Feeling tired or having little energy -   Poor appetite, weight loss, or overeating -   Feeling bad about yourself - or that you are a failure or have let yourself or your family down -   Trouble concentrating on things such as school, work, reading, or watching TV -   Moving or speaking so slowly that other people could have noticed; or the opposite being so fidgety that others notice -   Thoughts of being better off dead, or hurting yourself in some way -   PHQ 9 Score -     Abuse Screening Questionnaire 4/27/2022   Do you ever feel afraid of your partner? N   Are you in a relationship with someone who physically or mentally threatens you? N   Is it safe for you to go home?  Y       ADL Assessment 4/27/2022   Feeding yourself No Help Needed   Getting from bed to chair No Help Needed   Getting dressed No Help Needed   Bathing or showering No Help Needed   Walk across the room (includes cane/walker) No Help Needed   Using the telphone No Help Needed   Taking your medications No Help Needed   Preparing meals No Help Needed   Managing money (expenses/bills) No Help Needed   Moderately strenuous housework (laundry) No Help Needed   Shopping for personal items (toiletries/medicines) No Help Needed   Shopping for groceries No Help Needed   Driving No Help Needed   Climbing a flight of stairs No Help Needed   Getting to places beyond walking distances No Help Needed      Advance Care Planning 4/27/2022   Patient's Healthcare Decision Maker is: Named in scanned ACP document   Confirm Advance Directive Yes, on file

## 2022-04-27 NOTE — PROGRESS NOTES
Chief Complaint   Patient presents with   [facility] ED Follow-up     ED 4/15/22 to 4/16/22 Vertigo       Av Gordon is a [de-identified] y.o. female  HPI:  [address] visit. D/C summary reviewed as below, edited for brevity/clarity:  Admit date: 4/15/2022  Discharge date and time: 4/16/2022   LENA call 4/19/2022     DISCHARGE DIAGNOSIS:     Principal Problem:    Vertigo (4/15/2022)     Active Problems:    Esophageal reflux (8/20/2013)    History of ovarian cancer (9/15/2014)    Essential hypertension (8/20/2013)    Hyperlipemia (8/20/2013)     CONSULTATIONS:  None     Excerpted HPI from H&P of Luis Branch MD:  [de-identified] y.o. female presenting for admission to [facility] for further evaluation and treatment for Vertigo.      25-year-old female presenting to the ED via EMS with complaints of dizziness.  She awoke feeling fine this morning.  Symptoms began fairly abruptly about 12:30 PM after a normal morning.  She had dizziness as well as several episodes of vomiting.  She denies any chest pain, shortness of breath, abdominal pain.  She denied any urinary symptoms. Leiv Dwyer has a history of intermittent left-sided headaches.  In retrospect yesterday she did appreciate some brief transient lightheaded episodes.     On assessment in the ED she was observed to have circular nystagmus.  A teleneuro consult was obtained.  Symptoms suggested benign positional vertigo and she was given symptomatic treatment with oral Antivert, Ativan, Phenergan, Zofran.  She symptomatically improved at rest but with head rotation or standing up she had recurrent severe symptoms.  Patient does live alone.  MRI of the scan was obtained without significant finding.  She is admitted to [facility] stabilization of symptoms prior to discharge home. CTA HEAD/NECK 4/15:  CTA Head:  1. No evidence of significant stenosis or aneurysm. 2. An 8 mm calcified meningioma in the parasagittal right occipital lobe.   CTA Neck:  1. No evidence of significant stenosis.   Mild calcific atherosclerosis of the aortic arch. The common carotid arteries  demonstrate no significant stenosis. Calcific atherosclerosis of the right  carotid bifurcation without significant stenosis. Calcific atherosclerosis of  the left carotid bifurcation without significant stenosis.     There is a left dominant vertebrobasilar arterial system. The cervical vertebral  arteries are normal in course, size and contour without significant stenosis.      Visualized soft tissues of the neck are unremarkable. Visualized lung apices are  clear. No acute fracture or aggressive osseous lesion. Multilevel degenerative  disc disease and facet arthropathy in the cervical spine. Bilateral TMJ  arthrosis.     pCXR 4/15: none     MRI BRAIN 4/15: There is age-appropriate diffuse cortical atrophy. There are  bilateral periventricular white matter hypodensities consistent with chronic  microvascular ischemic changes. There is no restricted diffusion to suggest  acute infarct. Within the right parietal white matter, there is a subcentimeter  intensity on diffusion images but also demonstrates increased intensity on the  ADC map. The ventricular system is normal. The cervicomedullary junction is  normal and there is no tonsillar ectopia. There is no acute intracranial  hemorrhage, prior intracranial hemorrhage or extra-axial fluid collection. The  visualized vascular flow-voids of the skull base are normal. There is no  intracranial mass lesion, mass effect or herniation.   IMPRESSION: No acute intracranial hemorrhage or infarct. DISCHARGE MEDICATIONS:         Current Discharge Medication List             START taking these medications     Details   meclizine (ANTIVERT) 25 mg tablet Take 1 Tablet by mouth three (3) times daily as needed for Dizziness or Nausea for up to 10 days.   Qty: 20 Tablet, Refills: 0     STOP taking these medications         zolpidem (AMBIEN) 5 mg tablet Comments:   Reason for Stopping: ________________________________________________________    Hypertension. Blood pressures are a little above goal. Management at last visit included con't current regimen: angiotensin II receptor antagonist, beta-blocker, calcium channel blocker and aldactone. Symptoms include no symptoms. Patient denies chest pain, palpitations. Does have mild peripheral edema, stable, but worse at night. Lab review:   Lab Results   Component Value Date/Time    Sodium 139 04/15/2022 01:34 PM    Potassium 3.4 (L) 04/15/2022 01:34 PM    Chloride 102 04/15/2022 01:34 PM    CO2 24 04/15/2022 01:34 PM    Anion gap 13 04/15/2022 01:34 PM    Glucose 143 (H) 04/15/2022 01:34 PM    BUN 13 04/15/2022 01:34 PM    Creatinine 0.90 04/15/2022 01:34 PM    BUN/Creatinine ratio 14 04/15/2022 01:34 PM    GFR est AA >60 04/15/2022 01:34 PM    GFR est non-AA >60 04/15/2022 01:34 PM    Calcium 8.9 04/15/2022 01:34 PM     Hyperlipidemia. On crestor 5mg. Aren well. No myalgias, arthralgias, unusual weakness. Lab Results   Component Value Date/Time    Cholesterol, total 156 08/30/2021 09:49 AM    HDL Cholesterol 68 08/30/2021 09:49 AM    LDL, calculated 49.8 08/30/2021 09:49 AM    VLDL, calculated 38.2 08/30/2021 09:49 AM    Triglyceride 191 (H) 08/30/2021 09:49 AM    CHOL/HDL Ratio 2.3 08/30/2021 09:49 AM       Lab Results   Component Value Date/Time    ALT (SGPT) 25 04/15/2022 01:34 PM    Alk. phosphatase 87 04/15/2022 01:34 PM    Bilirubin, total 0.4 04/15/2022 01:34 PM         PMH, SH, Medications/Allergies: reviewed, on chart. Current Outpatient Medications   Medication Sig    acetaminophen (Tylenol Extra Strength) 500 mg tablet Take 1,000 mg by mouth every six (6) hours as needed for Pain.  ALPRAZolam (XANAX) 0.25 mg tablet Take 0.125 mg by mouth daily as needed for Anxiety (rarely uses).     spironolactone (ALDACTONE) 25 mg tablet TAKE 1/2 TABLET BY MOUTH DAILY    metoprolol succinate (TOPROL-XL) 50 mg XL tablet Take 1 tablet by mouth once daily    cholestyramine-sucrose 4 gram powder MIX 1 SCOOP (4 GRAMS TOTAL) WITH LIQUID AND DRINK TWICE DAILY    rosuvastatin (CRESTOR) 5 mg tablet Take 1 Tablet by mouth nightly. Indications: excessive fat in the blood    losartan (COZAAR) 100 mg tablet Take 1 Tablet by mouth daily for 360 days.  NIFEdipine ER (PROCARDIA XL) 60 mg ER tablet Take 1 Tablet by mouth daily.  omeprazole (PRILOSEC) 20 mg capsule TAKE 1 CAPSULE BY MOUTH DAILY INDICATIONS:  PREVENTION OF NSAID-INDUCED GASTRIC ULCER     No current facility-administered medications for this visit. ROS:  Constitutional: No fever, chills or abnormal weight loss  Respiratory: No cough, SOB   CV: No chest pain or Palpitations    Visit Vitals  BP (!) 152/70   Pulse 71   Temp 97.3 °F (36.3 °C) (Temporal)   Resp 20   Ht 5' 3\" (1.6 m)   Wt 137 lb (62.1 kg)   SpO2 99%   BMI 24.27 kg/m²   -2#  Wt Readings from Last 3 Encounters:   04/27/22 137 lb (62.1 kg)   04/15/22 135 lb (61.2 kg)   02/11/22 138 lb 4 oz (62.7 kg)     BP Readings from Last 3 Encounters:   04/27/22 (!) 152/70   04/16/22 (!) 142/65   02/11/22 120/60     Physical Examination: General appearance - alert, well appearing, and in no distress  Mental status - alert, oriented to person, place, and time  Eyes - pupils equal and reactive, extraocular eye movements intact  ENT - bilateral external ears and nose normal. Normal lips  Neck - supple, no significant adenopathy, no thyromegaly or mass  Chest - clear to auscultation, no wheezes, rales or rhonchi, symmetric air entry  Heart - normal rate, regular rhythm, normal S1, S2, no murmurs, rubs, clicks or gallops  Abd - NABS. SNT, no HSM/Mass. Extremities - peripheral pulses normal, no pedal edema, no clubbing or cyanosis    A/p:  Vertigo/BPPV  Imaging was benign. Sx in remission. Discussed physical therapy for prevention of future episodes, pt elected to see Gal for that. HTN  Mild on last check. BP good. Recheck today.   If Na+ still low, may need to d/c aldactone, consider plain furosemide for edema. 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 Monitor. con't home exercise program.    DJD spine and knees  Recheck xrays. Start tx with low dose mobic 7.5mg Daily. ADR/SE reviewed. If not santos, plan tx with tramadol, but would need to d/c BZD's in that case. Irritable bowel with loose stool  Better lately with questran. Con't, Monitor. HLD  well controlled. con't current tx. Plan labs summer 2022.

## 2022-10-14 ENCOUNTER — OFFICE VISIT (OUTPATIENT)
Dept: FAMILY MEDICINE CLINIC | Age: 82
End: 2022-10-14
Payer: MEDICARE

## 2022-10-14 VITALS
RESPIRATION RATE: 18 BRPM | OXYGEN SATURATION: 99 % | HEART RATE: 76 BPM | BODY MASS INDEX: 24.65 KG/M2 | HEIGHT: 63 IN | DIASTOLIC BLOOD PRESSURE: 70 MMHG | SYSTOLIC BLOOD PRESSURE: 120 MMHG | WEIGHT: 139.13 LBS | TEMPERATURE: 97.5 F

## 2022-10-14 DIAGNOSIS — E78.00 PURE HYPERCHOLESTEROLEMIA: ICD-10-CM

## 2022-10-14 DIAGNOSIS — K58.0 IRRITABLE BOWEL SYNDROME WITH DIARRHEA: ICD-10-CM

## 2022-10-14 DIAGNOSIS — Z85.43 HISTORY OF OVARIAN CANCER: ICD-10-CM

## 2022-10-14 DIAGNOSIS — I10 ESSENTIAL HYPERTENSION: ICD-10-CM

## 2022-10-14 DIAGNOSIS — L57.0 ACTINIC KERATOSIS: ICD-10-CM

## 2022-10-14 DIAGNOSIS — R73.9 HYPERGLYCEMIA: ICD-10-CM

## 2022-10-14 DIAGNOSIS — I25.10 ASCVD (ARTERIOSCLEROTIC CARDIOVASCULAR DISEASE): Primary | ICD-10-CM

## 2022-10-14 PROCEDURE — 90694 VACC AIIV4 NO PRSRV 0.5ML IM: CPT | Performed by: FAMILY MEDICINE

## 2022-10-14 PROCEDURE — G0008 ADMIN INFLUENZA VIRUS VAC: HCPCS | Performed by: FAMILY MEDICINE

## 2022-10-14 PROCEDURE — 99214 OFFICE O/P EST MOD 30 MIN: CPT | Performed by: FAMILY MEDICINE

## 2022-10-14 RX ORDER — DIPHENOXYLATE HYDROCHLORIDE AND ATROPINE SULFATE 2.5; .025 MG/1; MG/1
1 TABLET ORAL
Qty: 20 TABLET | Refills: 1 | Status: SHIPPED | OUTPATIENT
Start: 2022-10-14

## 2022-10-14 RX ORDER — LOSARTAN POTASSIUM 100 MG/1
100 TABLET ORAL DAILY
Qty: 90 TABLET | Refills: 3 | Status: SHIPPED | OUTPATIENT
Start: 2022-10-14 | End: 2022-11-04 | Stop reason: SDUPTHER

## 2022-10-14 RX ORDER — NIFEDIPINE 60 MG/1
60 TABLET, EXTENDED RELEASE ORAL DAILY
Qty: 90 TABLET | Refills: 3 | Status: SHIPPED | OUTPATIENT
Start: 2022-10-14 | End: 2022-11-04 | Stop reason: SDUPTHER

## 2022-10-14 RX ORDER — IMIQUIMOD 12.5 MG/.25G
CREAM TOPICAL
Qty: 12 PACKET | Refills: 0 | Status: SHIPPED | OUTPATIENT
Start: 2022-10-14 | End: 2022-10-18 | Stop reason: SDUPTHER

## 2022-10-14 NOTE — PROGRESS NOTES
Visit Vitals  /70   Pulse 76   Temp 97.5 °F (36.4 °C) (Temporal)   Resp 18   Ht 5' 3\" (1.6 m)   Wt 139 lb 2 oz (63.1 kg)   SpO2 99%   BMI 24.64 kg/m²     Chief Complaint   Patient presents with    Follow-up     1. Have you been to the ER, urgent care clinic since your last visit? Hospitalized since your last visit? No    2. Have you seen or consulted any other health care providers outside of the [address] [place] [address] since your last visit? Include any pap smears or colon screening. No  After obtaining consent, and per orders of Dr. Zac Howard, injection of Fluad given by Marcia Porras. Patient instructed to remain in clinic for 20 minutes afterwards, and to report any adverse reaction to me immediately.

## 2022-10-14 NOTE — PROGRESS NOTES
Chief Complaint   Patient presents with    Follow-up     America Rayo is a 81 y.o. female    HPI:  Hypertension. Blood pressures are a little above goal. Management at last visit included con't current regimen: angiotensin II receptor antagonist, beta-blocker, calcium channel blocker and aldactone. Symptoms include no symptoms. Patient denies chest pain, palpitations. Does have mild peripheral edema, stable, but worse at night. Lab review:   Lab Results   Component Value Date/Time    Sodium 139 04/15/2022 01:34 PM    Potassium 3.4 (L) 04/15/2022 01:34 PM    Chloride 102 04/15/2022 01:34 PM    CO2 24 04/15/2022 01:34 PM    Anion gap 13 04/15/2022 01:34 PM    Glucose 143 (H) 04/15/2022 01:34 PM    BUN 13 04/15/2022 01:34 PM    Creatinine 0.90 04/15/2022 01:34 PM    BUN/Creatinine ratio 14 04/15/2022 01:34 PM    GFR est AA >60 04/15/2022 01:34 PM    GFR est non-AA >60 04/15/2022 01:34 PM    Calcium 8.9 04/15/2022 01:34 PM     Hyperlipidemia. On crestor 5mg. Aren well. No myalgias, arthralgias, unusual weakness. Lab Results   Component Value Date/Time    Cholesterol, total 156 08/30/2021 09:49 AM    HDL Cholesterol 68 08/30/2021 09:49 AM    LDL, calculated 49.8 08/30/2021 09:49 AM    VLDL, calculated 38.2 08/30/2021 09:49 AM    Triglyceride 191 (H) 08/30/2021 09:49 AM    CHOL/HDL Ratio 2.3 08/30/2021 09:49 AM       Lab Results   Component Value Date/Time    ALT (SGPT) 25 04/15/2022 01:34 PM    Alk. phosphatase 87 04/15/2022 01:34 PM    Bilirubin, total 0.4 04/15/2022 01:34 PM       PMH, SH, Medications/Allergies: reviewed, on chart. Current Outpatient Medications   Medication Sig    meloxicam (MOBIC) 7.5 mg tablet Take 1 Tablet by mouth daily. acetaminophen (TYLENOL) 500 mg tablet Take 1,000 mg by mouth every six (6) hours as needed for Pain. ALPRAZolam (XANAX) 0.25 mg tablet Take 0.125 mg by mouth daily as needed for Anxiety (rarely uses).     spironolactone (ALDACTONE) 25 mg tablet TAKE 1/2 TABLET BY MOUTH DAILY    metoprolol succinate (TOPROL-XL) 50 mg XL tablet Take 1 tablet by mouth once daily    cholestyramine-sucrose 4 gram powder MIX 1 SCOOP (4 GRAMS TOTAL) WITH LIQUID AND DRINK TWICE DAILY    rosuvastatin (CRESTOR) 5 mg tablet Take 1 Tablet by mouth nightly. Indications: excessive fat in the blood    losartan (COZAAR) 100 mg tablet Take 1 Tablet by mouth daily for 360 days. NIFEdipine ER (PROCARDIA XL) 60 mg ER tablet Take 1 Tablet by mouth daily. omeprazole (PRILOSEC) 20 mg capsule TAKE 1 CAPSULE BY MOUTH DAILY INDICATIONS:  PREVENTION OF NSAID-INDUCED GASTRIC ULCER     No current facility-administered medications for this visit. ROS:  Constitutional: No fever, chills or abnormal weight loss  Respiratory: No cough, SOB   CV: No chest pain or Palpitations    Visit Vitals  /70   Pulse 76   Temp 97.5 °F (36.4 °C) (Temporal)   Resp 18   Ht 5' 3\" (1.6 m)   Wt 139 lb 2 oz (63.1 kg)   SpO2 99%   BMI 24.64 kg/m²   +2#  Wt Readings from Last 3 Encounters:   10/14/22 139 lb 2 oz (63.1 kg)   04/27/22 137 lb (62.1 kg)   04/15/22 135 lb (61.2 kg)     BP Readings from Last 3 Encounters:   10/14/22 120/70   04/27/22 (!) 152/70   04/16/22 (!) 142/65     Physical Examination: General appearance - alert, well appearing, and in no distress  Mental status - alert, oriented to person, place, and time  Eyes - pupils equal and reactive, extraocular eye movements intact  ENT - bilateral external ears and nose normal. Normal lips  Neck - supple, no significant adenopathy, no thyromegaly or mass  Chest - clear to auscultation, no wheezes, rales or rhonchi, symmetric air entry  Heart - normal rate, regular rhythm, normal S1, S2, no murmurs, rubs, clicks or gallops  Extremities - peripheral pulses normal, no pedal edema, no clubbing or cyanosis    A/p:  HTN  well controlled. con't current tx. Recheck labs today. If Na+ low, may need to d/c aldactone, consider plain furosemide for edema.      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 Monitor. con't home exercise program.    DJD spine and knees  Doing ok with mobic 7.5mg Daily as needed for pain. Monitor. Irritable bowel with loose stool  Fair control lately with questran. Add back lomotil for bad days, Monitor. HLD  well controlled. con't current tx. Check Labs, adj PRN    Hyperglycemia  Mild. Check labs, tx PRN    HX Vertigo/BPPV  Imaging was benign. Sx in remission. Did physical therapy for prevention with Gal for that, helped some. RPT prn. AK to R upper cheek, incidentally mentioned  Recurrent s/p LN2 tx in past. Try aldara x1mo, 5x/wk. Rx ore with teaching.     Follow up 6mo/PRN

## 2022-10-15 LAB
ALBUMIN SERPL-MCNC: 3.5 G/DL (ref 3.5–5)
ALBUMIN/GLOB SERPL: 1.3 {RATIO} (ref 1.1–2.2)
ALP SERPL-CCNC: 75 U/L (ref 45–117)
ALT SERPL-CCNC: 25 U/L (ref 12–78)
ANION GAP SERPL CALC-SCNC: 4 MMOL/L (ref 5–15)
AST SERPL-CCNC: 13 U/L (ref 15–37)
BILIRUB SERPL-MCNC: 0.3 MG/DL (ref 0.2–1)
BUN SERPL-MCNC: 15 MG/DL (ref 6–20)
BUN/CREAT SERPL: 17 (ref 12–20)
CALCIUM SERPL-MCNC: 8.6 MG/DL (ref 8.5–10.1)
CANCER AG125 SERPL-ACNC: 6 U/ML (ref 1.5–35)
CHLORIDE SERPL-SCNC: 105 MMOL/L (ref 97–108)
CHOLEST SERPL-MCNC: 133 MG/DL
CO2 SERPL-SCNC: 27 MMOL/L (ref 21–32)
CREAT SERPL-MCNC: 0.88 MG/DL (ref 0.55–1.02)
EST. AVERAGE GLUCOSE BLD GHB EST-MCNC: 120 MG/DL
GLOBULIN SER CALC-MCNC: 2.8 G/DL (ref 2–4)
GLUCOSE SERPL-MCNC: 116 MG/DL (ref 65–100)
HBA1C MFR BLD: 5.8 % (ref 4–5.6)
HDLC SERPL-MCNC: 52 MG/DL
HDLC SERPL: 2.6 {RATIO} (ref 0–5)
LDLC SERPL CALC-MCNC: 24.8 MG/DL (ref 0–100)
POTASSIUM SERPL-SCNC: 4.1 MMOL/L (ref 3.5–5.1)
PROT SERPL-MCNC: 6.3 G/DL (ref 6.4–8.2)
SODIUM SERPL-SCNC: 136 MMOL/L (ref 136–145)
TRIGL SERPL-MCNC: 281 MG/DL (ref ?–150)
VLDLC SERPL CALC-MCNC: 56.2 MG/DL

## 2022-10-18 DIAGNOSIS — L57.0 ACTINIC KERATOSIS: ICD-10-CM

## 2022-10-18 RX ORDER — IMIQUIMOD 12.5 MG/.25G
CREAM TOPICAL
Qty: 12 PACKET | Refills: 0 | Status: SHIPPED | OUTPATIENT
Start: 2022-10-18

## 2022-10-21 DIAGNOSIS — E78.5 HYPERLIPIDEMIA, UNSPECIFIED HYPERLIPIDEMIA TYPE: ICD-10-CM

## 2022-10-21 DIAGNOSIS — K21.9 GASTROESOPHAGEAL REFLUX DISEASE WITHOUT ESOPHAGITIS: ICD-10-CM

## 2022-10-21 DIAGNOSIS — C56.9 MALIGNANT NEOPLASM OF OVARY, UNSPECIFIED LATERALITY (HCC): ICD-10-CM

## 2022-10-21 RX ORDER — CHOLESTYRAMINE 4 G/9G
POWDER, FOR SUSPENSION ORAL
Qty: 756 G | Refills: 12 | Status: SHIPPED | OUTPATIENT
Start: 2022-10-21

## 2022-11-04 DIAGNOSIS — E78.00 PURE HYPERCHOLESTEROLEMIA: ICD-10-CM

## 2022-11-04 DIAGNOSIS — I10 ESSENTIAL HYPERTENSION: ICD-10-CM

## 2022-11-04 DIAGNOSIS — K21.9 GASTROESOPHAGEAL REFLUX DISEASE WITHOUT ESOPHAGITIS: ICD-10-CM

## 2022-11-04 RX ORDER — ROSUVASTATIN CALCIUM 5 MG/1
5 TABLET, COATED ORAL
Qty: 90 TABLET | Refills: 3 | Status: SHIPPED | OUTPATIENT
Start: 2022-11-04

## 2022-11-04 RX ORDER — NIFEDIPINE 60 MG/1
60 TABLET, EXTENDED RELEASE ORAL DAILY
Qty: 90 TABLET | Refills: 3 | Status: SHIPPED | OUTPATIENT
Start: 2022-11-04

## 2022-11-04 RX ORDER — METOPROLOL SUCCINATE 50 MG/1
50 TABLET, EXTENDED RELEASE ORAL DAILY
Qty: 90 TABLET | Refills: 3 | Status: SHIPPED | OUTPATIENT
Start: 2022-11-04

## 2022-11-04 RX ORDER — LOSARTAN POTASSIUM 100 MG/1
100 TABLET ORAL DAILY
Qty: 90 TABLET | Refills: 3 | Status: SHIPPED | OUTPATIENT
Start: 2022-11-04 | End: 2023-10-30

## 2022-11-04 RX ORDER — OMEPRAZOLE 20 MG/1
CAPSULE, DELAYED RELEASE ORAL
Qty: 90 CAPSULE | Refills: 3 | Status: SHIPPED | OUTPATIENT
Start: 2022-11-04

## 2023-03-21 DIAGNOSIS — I10 ESSENTIAL HYPERTENSION: ICD-10-CM

## 2023-03-21 RX ORDER — SPIRONOLACTONE 25 MG/1
TABLET ORAL
Qty: 45 TABLET | Refills: 3 | Status: SHIPPED | OUTPATIENT
Start: 2023-03-21

## 2023-03-27 ENCOUNTER — TELEPHONE (OUTPATIENT)
Dept: FAMILY MEDICINE CLINIC | Age: 83
End: 2023-03-27

## 2023-03-27 DIAGNOSIS — F51.01 PRIMARY INSOMNIA: ICD-10-CM

## 2023-03-27 RX ORDER — ZOLPIDEM TARTRATE 5 MG/1
TABLET ORAL
Qty: 30 TABLET | Refills: 0 | Status: SHIPPED | OUTPATIENT
Start: 2023-03-27

## 2023-03-27 NOTE — TELEPHONE ENCOUNTER
Pt requesting medication Zolpidem 5mg be called into [facility]

## 2023-03-27 NOTE — TELEPHONE ENCOUNTER
[facility] reviewed. Fill pattern in goal. Hasn't filled this in over a  year, will give 30 tabs for now.

## 2023-03-30 ENCOUNTER — TRANSCRIBE ORDER (OUTPATIENT)
Dept: REGISTRATION | Age: 83
End: 2023-03-30

## 2023-03-30 ENCOUNTER — HOSPITAL ENCOUNTER (OUTPATIENT)
Dept: GENERAL RADIOLOGY | Age: 83
Discharge: HOME | End: 2023-03-30
Attending: OBSTETRICS & GYNECOLOGY
Payer: MEDICARE

## 2023-03-30 ENCOUNTER — HOSPITAL ENCOUNTER (OUTPATIENT)
Dept: MAMMOGRAPHY | Age: 83
Discharge: HOME | End: 2023-03-30
Attending: OBSTETRICS & GYNECOLOGY
Payer: MEDICARE

## 2023-03-30 ENCOUNTER — HOSPITAL ENCOUNTER (OUTPATIENT)
Dept: ULTRASOUND IMAGING | Age: 83
Discharge: HOME | End: 2023-03-30
Attending: OBSTETRICS & GYNECOLOGY
Payer: MEDICARE

## 2023-03-30 DIAGNOSIS — R07.9 CHEST PAIN, UNSPECIFIED: ICD-10-CM

## 2023-03-30 DIAGNOSIS — N63.10 LUMP OF RIGHT BREAST: ICD-10-CM

## 2023-03-30 DIAGNOSIS — N64.4 BREAST PAIN, RIGHT: ICD-10-CM

## 2023-03-30 DIAGNOSIS — R07.9 CHEST PAIN, UNSPECIFIED: Primary | ICD-10-CM

## 2023-03-30 PROCEDURE — 76642 ULTRASOUND BREAST LIMITED: CPT

## 2023-03-30 PROCEDURE — 77062 BREAST TOMOSYNTHESIS BI: CPT

## 2023-03-30 PROCEDURE — 71046 X-RAY EXAM CHEST 2 VIEWS: CPT

## 2023-03-31 DIAGNOSIS — R92.8 ABNORMAL MAMMOGRAM OF RIGHT BREAST: Primary | ICD-10-CM

## 2023-03-31 DIAGNOSIS — Z12.39 ENCOUNTER FOR BREAST CANCER SCREENING USING NON-MAMMOGRAM MODALITY: ICD-10-CM

## 2023-04-01 NOTE — PROGRESS NOTES
Mammo shows small cluster of cysts. Recommended recheck [address] R breast in 9/2023-10/2023 timeframe. Future orders placed, but letter sent to patient as well. Chart has lactation status YES, but that is an error, she is 81yo. Will ask nursing to correct.

## 2023-04-23 DIAGNOSIS — R92.8 ABNORMAL MAMMOGRAM OF RIGHT BREAST: Primary | ICD-10-CM

## 2023-04-23 DIAGNOSIS — Z12.39 ENCOUNTER FOR BREAST CANCER SCREENING USING NON-MAMMOGRAM MODALITY: ICD-10-CM

## 2023-06-05 ENCOUNTER — TELEPHONE (OUTPATIENT)
Age: 83
End: 2023-06-05

## 2023-06-05 DIAGNOSIS — G47.01 INSOMNIA DUE TO MEDICAL CONDITION: Primary | ICD-10-CM

## 2023-06-05 RX ORDER — ZOLPIDEM TARTRATE 5 MG/1
5 TABLET ORAL NIGHTLY PRN
Qty: 30 TABLET | Refills: 5 | Status: SHIPPED | OUTPATIENT
Start: 2023-06-05 | End: 2023-12-02

## 2023-06-07 ENCOUNTER — TELEPHONE (OUTPATIENT)
Age: 83
End: 2023-06-07

## 2023-06-07 NOTE — TELEPHONE ENCOUNTER
Pt calling in with chest discomfort since 6/5/23. States started on Monday and did not sleep well that night. Felt again Tuesday while eating. Then later when she was just resting. She feels that she needs to be seen and have EKG done. Writer advised patient to go to the ER or Urgent care as office does not have any availability until next week. 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 Writer advised he we only have one provider today and he cannot add any more patients to schedule and she should be evaluated to make sure these sx are not heart related. Pt disconnected call.

## 2023-07-13 ENCOUNTER — TELEPHONE (OUTPATIENT)
Age: 83
End: 2023-07-13

## 2023-07-13 NOTE — TELEPHONE ENCOUNTER
Pt called wanted to be seen tomorrow 7/14 no available appts. Pt c/o diarrhea that has been going on for 1 mth.

## 2023-07-17 ENCOUNTER — OFFICE VISIT (OUTPATIENT)
Age: 83
End: 2023-07-17
Payer: MEDICARE

## 2023-07-17 VITALS
TEMPERATURE: 97.5 F | OXYGEN SATURATION: 97 % | WEIGHT: 141.6 LBS | SYSTOLIC BLOOD PRESSURE: 142 MMHG | RESPIRATION RATE: 18 BRPM | BODY MASS INDEX: 25.09 KG/M2 | HEIGHT: 63 IN | DIASTOLIC BLOOD PRESSURE: 60 MMHG | HEART RATE: 72 BPM

## 2023-07-17 DIAGNOSIS — Z87.898 HISTORY OF VERTIGO: ICD-10-CM

## 2023-07-17 DIAGNOSIS — R19.7 DIARRHEA, UNSPECIFIED TYPE: Primary | ICD-10-CM

## 2023-07-17 DIAGNOSIS — R51.9 SINUS HEADACHE: ICD-10-CM

## 2023-07-17 DIAGNOSIS — G47.01 INSOMNIA DUE TO MEDICAL CONDITION: ICD-10-CM

## 2023-07-17 PROCEDURE — 1123F ACP DISCUSS/DSCN MKR DOCD: CPT | Performed by: NURSE PRACTITIONER

## 2023-07-17 PROCEDURE — G8419 CALC BMI OUT NRM PARAM NOF/U: HCPCS | Performed by: NURSE PRACTITIONER

## 2023-07-17 PROCEDURE — G8399 PT W/DXA RESULTS DOCUMENT: HCPCS | Performed by: NURSE PRACTITIONER

## 2023-07-17 PROCEDURE — 1036F TOBACCO NON-USER: CPT | Performed by: NURSE PRACTITIONER

## 2023-07-17 PROCEDURE — 3078F DIAST BP <80 MM HG: CPT | Performed by: NURSE PRACTITIONER

## 2023-07-17 PROCEDURE — 99214 OFFICE O/P EST MOD 30 MIN: CPT | Performed by: NURSE PRACTITIONER

## 2023-07-17 PROCEDURE — 3077F SYST BP >= 140 MM HG: CPT | Performed by: NURSE PRACTITIONER

## 2023-07-17 PROCEDURE — G8427 DOCREV CUR MEDS BY ELIG CLIN: HCPCS | Performed by: NURSE PRACTITIONER

## 2023-07-17 PROCEDURE — 1090F PRES/ABSN URINE INCON ASSESS: CPT | Performed by: NURSE PRACTITIONER

## 2023-07-17 RX ORDER — MELOXICAM 7.5 MG/1
7.5 TABLET ORAL DAILY
Qty: 30 TABLET | Refills: 0 | Status: SHIPPED | OUTPATIENT
Start: 2023-07-17

## 2023-07-17 RX ORDER — DIPHENOXYLATE HYDROCHLORIDE AND ATROPINE SULFATE 2.5; .025 MG/1; MG/1
1 TABLET ORAL 2 TIMES DAILY PRN
Qty: 30 TABLET | Refills: 0 | Status: SHIPPED | OUTPATIENT
Start: 2023-07-17 | End: 2023-08-16

## 2023-07-17 RX ORDER — MECLIZINE HYDROCHLORIDE 25 MG/1
25 TABLET ORAL 3 TIMES DAILY PRN
Qty: 30 TABLET | Refills: 0 | Status: SHIPPED | OUTPATIENT
Start: 2023-07-17 | End: 2023-07-27

## 2023-07-17 SDOH — ECONOMIC STABILITY: INCOME INSECURITY: HOW HARD IS IT FOR YOU TO PAY FOR THE VERY BASICS LIKE FOOD, HOUSING, MEDICAL CARE, AND HEATING?: NOT HARD AT ALL

## 2023-07-17 SDOH — ECONOMIC STABILITY: FOOD INSECURITY: WITHIN THE PAST 12 MONTHS, THE FOOD YOU BOUGHT JUST DIDN'T LAST AND YOU DIDN'T HAVE MONEY TO GET MORE.: NEVER TRUE

## 2023-07-17 SDOH — ECONOMIC STABILITY: HOUSING INSECURITY
IN THE LAST 12 MONTHS, WAS THERE A TIME WHEN YOU DID NOT HAVE A STEADY PLACE TO SLEEP OR SLEPT IN A SHELTER (INCLUDING NOW)?: NO

## 2023-07-17 SDOH — ECONOMIC STABILITY: FOOD INSECURITY: WITHIN THE PAST 12 MONTHS, YOU WORRIED THAT YOUR FOOD WOULD RUN OUT BEFORE YOU GOT MONEY TO BUY MORE.: NEVER TRUE

## 2023-07-17 ASSESSMENT — PATIENT HEALTH QUESTIONNAIRE - PHQ9
SUM OF ALL RESPONSES TO PHQ QUESTIONS 1-9: 0
SUM OF ALL RESPONSES TO PHQ9 QUESTIONS 1 & 2: 0
1. LITTLE INTEREST OR PLEASURE IN DOING THINGS: 0
SUM OF ALL RESPONSES TO PHQ QUESTIONS 1-9: 0
2. FEELING DOWN, DEPRESSED OR HOPELESS: 0

## 2023-07-18 RX ORDER — ZOLPIDEM TARTRATE 5 MG/1
5 TABLET ORAL NIGHTLY PRN
Qty: 30 TABLET | Refills: 5 | Status: SHIPPED | OUTPATIENT
Start: 2023-07-18 | End: 2024-01-14

## 2023-08-22 ENCOUNTER — TELEPHONE (OUTPATIENT)
Age: 83
End: 2023-08-22

## 2023-08-22 DIAGNOSIS — G47.01 INSOMNIA DUE TO MEDICAL CONDITION: ICD-10-CM

## 2023-08-22 RX ORDER — ZOLPIDEM TARTRATE 5 MG/1
5 TABLET ORAL NIGHTLY PRN
Qty: 90 TABLET | Refills: 1 | Status: SHIPPED | OUTPATIENT
Start: 2023-08-22 | End: 2024-02-18

## 2023-09-28 ENCOUNTER — HOSPITAL ENCOUNTER (OUTPATIENT)
Facility: HOSPITAL | Age: 83
Discharge: HOME | End: 2023-09-28
Attending: FAMILY MEDICINE
Payer: MEDICARE

## 2023-09-28 DIAGNOSIS — R92.8 ABNORMAL MAMMOGRAM OF RIGHT BREAST: ICD-10-CM

## 2023-09-28 DIAGNOSIS — Z12.39 ENCOUNTER FOR BREAST CANCER SCREENING USING NON-MAMMOGRAM MODALITY: ICD-10-CM

## 2023-09-28 PROCEDURE — 76642 ULTRASOUND BREAST LIMITED: CPT

## 2023-10-02 DIAGNOSIS — G47.01 INSOMNIA DUE TO MEDICAL CONDITION: ICD-10-CM

## 2023-10-02 DIAGNOSIS — R92.8 ABNORMAL MAMMOGRAM OF RIGHT BREAST: Primary | ICD-10-CM

## 2023-10-02 DIAGNOSIS — Z12.31 ENCOUNTER FOR SCREENING MAMMOGRAM FOR MALIGNANT NEOPLASM OF BREAST: ICD-10-CM

## 2023-10-13 ENCOUNTER — TRANSCRIBE ORDERS (OUTPATIENT)
Facility: HOSPITAL | Age: 83
End: 2023-10-13

## 2023-10-13 DIAGNOSIS — R92.8 ABNORMAL MAMMOGRAM: Primary | ICD-10-CM

## 2023-10-20 ENCOUNTER — OFFICE VISIT (OUTPATIENT)
Age: 83
End: 2023-10-20
Payer: MEDICARE

## 2023-10-20 VITALS
RESPIRATION RATE: 18 BRPM | HEIGHT: 63 IN | TEMPERATURE: 97.8 F | BODY MASS INDEX: 25.41 KG/M2 | WEIGHT: 143.4 LBS | SYSTOLIC BLOOD PRESSURE: 155 MMHG | OXYGEN SATURATION: 98 % | HEART RATE: 72 BPM | DIASTOLIC BLOOD PRESSURE: 73 MMHG

## 2023-10-20 DIAGNOSIS — I25.10 ASCVD (ARTERIOSCLEROTIC CARDIOVASCULAR DISEASE): Primary | ICD-10-CM

## 2023-10-20 DIAGNOSIS — C44.91 BCE (BASAL CELL EPITHELIOMA): ICD-10-CM

## 2023-10-20 DIAGNOSIS — Z85.43 HISTORY OF OVARIAN CANCER: ICD-10-CM

## 2023-10-20 DIAGNOSIS — E78.2 MIXED HYPERLIPIDEMIA: ICD-10-CM

## 2023-10-20 DIAGNOSIS — I10 ESSENTIAL HYPERTENSION: ICD-10-CM

## 2023-10-20 DIAGNOSIS — K59.1 FUNCTIONAL DIARRHEA: ICD-10-CM

## 2023-10-20 DIAGNOSIS — F41.1 GENERALIZED ANXIETY DISORDER: ICD-10-CM

## 2023-10-20 DIAGNOSIS — G89.29 CHRONIC MIDLINE LOW BACK PAIN WITHOUT SCIATICA: ICD-10-CM

## 2023-10-20 DIAGNOSIS — R42 VERTIGO: ICD-10-CM

## 2023-10-20 DIAGNOSIS — Z23 NEED FOR INFLUENZA VACCINATION: ICD-10-CM

## 2023-10-20 DIAGNOSIS — R73.9 HYPERGLYCEMIA: ICD-10-CM

## 2023-10-20 DIAGNOSIS — M54.50 CHRONIC MIDLINE LOW BACK PAIN WITHOUT SCIATICA: ICD-10-CM

## 2023-10-20 DIAGNOSIS — D48.5 NEOPLASM OF UNCERTAIN BEHAVIOR OF SKIN: ICD-10-CM

## 2023-10-20 LAB
ALBUMIN SERPL-MCNC: 3.4 G/DL (ref 3.5–5)
ALBUMIN/GLOB SERPL: 1.1 (ref 1.1–2.2)
ALP SERPL-CCNC: 78 U/L (ref 45–117)
ALT SERPL-CCNC: 25 U/L (ref 12–78)
ANION GAP SERPL CALC-SCNC: 4 MMOL/L (ref 5–15)
AST SERPL-CCNC: 18 U/L (ref 15–37)
BILIRUB SERPL-MCNC: 0.3 MG/DL (ref 0.2–1)
BUN SERPL-MCNC: 12 MG/DL (ref 6–20)
BUN/CREAT SERPL: 12 (ref 12–20)
CALCIUM SERPL-MCNC: 8.8 MG/DL (ref 8.5–10.1)
CANCER AG125 SERPL-ACNC: 5 U/ML (ref 1.5–35)
CHLORIDE SERPL-SCNC: 104 MMOL/L (ref 97–108)
CHOLEST SERPL-MCNC: 140 MG/DL
CO2 SERPL-SCNC: 27 MMOL/L (ref 21–32)
CREAT SERPL-MCNC: 1 MG/DL (ref 0.55–1.02)
EST. AVERAGE GLUCOSE BLD GHB EST-MCNC: 120 MG/DL
GLOBULIN SER CALC-MCNC: 3.1 G/DL (ref 2–4)
GLUCOSE SERPL-MCNC: 119 MG/DL (ref 65–100)
HBA1C MFR BLD: 5.8 % (ref 4–5.6)
HDLC SERPL-MCNC: 56 MG/DL
HDLC SERPL: 2.5 (ref 0–5)
LDLC SERPL CALC-MCNC: 22.6 MG/DL (ref 0–100)
POTASSIUM SERPL-SCNC: 4.1 MMOL/L (ref 3.5–5.1)
PROT SERPL-MCNC: 6.5 G/DL (ref 6.4–8.2)
SODIUM SERPL-SCNC: 135 MMOL/L (ref 136–145)
TRIGL SERPL-MCNC: 307 MG/DL
VLDLC SERPL CALC-MCNC: 61.4 MG/DL

## 2023-10-20 PROCEDURE — 90694 VACC AIIV4 NO PRSRV 0.5ML IM: CPT | Performed by: FAMILY MEDICINE

## 2023-10-20 PROCEDURE — G8399 PT W/DXA RESULTS DOCUMENT: HCPCS | Performed by: FAMILY MEDICINE

## 2023-10-20 PROCEDURE — 3077F SYST BP >= 140 MM HG: CPT | Performed by: FAMILY MEDICINE

## 2023-10-20 PROCEDURE — 3078F DIAST BP <80 MM HG: CPT | Performed by: FAMILY MEDICINE

## 2023-10-20 PROCEDURE — 1123F ACP DISCUSS/DSCN MKR DOCD: CPT | Performed by: FAMILY MEDICINE

## 2023-10-20 PROCEDURE — G8484 FLU IMMUNIZE NO ADMIN: HCPCS | Performed by: FAMILY MEDICINE

## 2023-10-20 PROCEDURE — G8427 DOCREV CUR MEDS BY ELIG CLIN: HCPCS | Performed by: FAMILY MEDICINE

## 2023-10-20 PROCEDURE — 1090F PRES/ABSN URINE INCON ASSESS: CPT | Performed by: FAMILY MEDICINE

## 2023-10-20 PROCEDURE — G8419 CALC BMI OUT NRM PARAM NOF/U: HCPCS | Performed by: FAMILY MEDICINE

## 2023-10-20 PROCEDURE — 1036F TOBACCO NON-USER: CPT | Performed by: FAMILY MEDICINE

## 2023-10-20 RX ORDER — MELOXICAM 7.5 MG/1
7.5 TABLET ORAL DAILY
Qty: 90 TABLET | Refills: 3 | Status: SHIPPED | OUTPATIENT
Start: 2023-10-20

## 2023-10-20 RX ORDER — ALPRAZOLAM 0.25 MG/1
0.25 TABLET ORAL DAILY PRN
Qty: 30 TABLET | Refills: 1 | Status: SHIPPED | OUTPATIENT
Start: 2023-10-20 | End: 2023-12-19

## 2023-10-20 RX ORDER — DIPHENOXYLATE HYDROCHLORIDE AND ATROPINE SULFATE 2.5; .025 MG/1; MG/1
1 TABLET ORAL 4 TIMES DAILY PRN
Qty: 60 TABLET | Refills: 1 | Status: SHIPPED | OUTPATIENT
Start: 2023-10-20 | End: 2023-11-19

## 2023-10-20 RX ORDER — NALOXONE HYDROCHLORIDE 4 MG/.1ML
1 SPRAY NASAL PRN
Qty: 1 EACH | Refills: 0 | Status: SHIPPED | OUTPATIENT
Start: 2023-10-20 | End: 2023-10-21

## 2023-10-20 RX ORDER — MECLIZINE HYDROCHLORIDE 25 MG/1
25 TABLET ORAL 3 TIMES DAILY PRN
Qty: 30 TABLET | Refills: 5 | Status: SHIPPED | OUTPATIENT
Start: 2023-10-20

## 2023-10-20 ASSESSMENT — PATIENT HEALTH QUESTIONNAIRE - PHQ9
1. LITTLE INTEREST OR PLEASURE IN DOING THINGS: 0
SUM OF ALL RESPONSES TO PHQ QUESTIONS 1-9: 0
2. FEELING DOWN, DEPRESSED OR HOPELESS: 0
SUM OF ALL RESPONSES TO PHQ QUESTIONS 1-9: 0
SUM OF ALL RESPONSES TO PHQ QUESTIONS 1-9: 0
SUM OF ALL RESPONSES TO PHQ9 QUESTIONS 1 & 2: 0
SUM OF ALL RESPONSES TO PHQ QUESTIONS 1-9: 0

## 2023-10-20 NOTE — PATIENT INSTRUCTIONS
RSV vaccine and new COVID omicron vaccines due this fall at your pharmacy. Please get those when available, they can help prevent severe lung disease. Consider getting the new shingles booster, it's free now, as well as a tetanus (TdAP).

## 2023-11-06 ENCOUNTER — HOSPITAL ENCOUNTER (OUTPATIENT)
Facility: HOSPITAL | Age: 83
Setting detail: SPECIMEN
Discharge: HOME | End: 2023-11-09

## 2023-11-06 ENCOUNTER — PROCEDURE VISIT (OUTPATIENT)
Age: 83
End: 2023-11-06
Payer: MEDICARE

## 2023-11-06 VITALS
RESPIRATION RATE: 18 BRPM | HEART RATE: 83 BPM | DIASTOLIC BLOOD PRESSURE: 66 MMHG | OXYGEN SATURATION: 99 % | TEMPERATURE: 97.8 F | SYSTOLIC BLOOD PRESSURE: 144 MMHG | WEIGHT: 143.8 LBS | HEIGHT: 63 IN | BODY MASS INDEX: 25.48 KG/M2

## 2023-11-06 DIAGNOSIS — F41.1 GENERALIZED ANXIETY DISORDER: ICD-10-CM

## 2023-11-06 DIAGNOSIS — G47.01 INSOMNIA DUE TO MEDICAL CONDITION: ICD-10-CM

## 2023-11-06 DIAGNOSIS — I10 ESSENTIAL HYPERTENSION: ICD-10-CM

## 2023-11-06 DIAGNOSIS — D48.5 NEOPLASM OF UNCERTAIN BEHAVIOR OF SKIN: Primary | ICD-10-CM

## 2023-11-06 PROCEDURE — 11102 TANGNTL BX SKIN SINGLE LES: CPT | Performed by: FAMILY MEDICINE

## 2023-11-06 RX ORDER — LIDOCAINE HYDROCHLORIDE AND EPINEPHRINE BITARTRATE 20; .01 MG/ML; MG/ML
1 INJECTION, SOLUTION SUBCUTANEOUS ONCE
Status: COMPLETED | OUTPATIENT
Start: 2023-11-06 | End: 2023-11-06

## 2023-11-06 RX ORDER — LOSARTAN POTASSIUM 100 MG/1
100 TABLET ORAL DAILY
Qty: 90 TABLET | Refills: 3 | Status: SHIPPED | OUTPATIENT
Start: 2023-11-06 | End: 2024-10-31

## 2023-11-06 RX ORDER — ZOLPIDEM TARTRATE 5 MG/1
5 TABLET ORAL NIGHTLY PRN
Qty: 90 TABLET | Refills: 0
Start: 2023-11-06 | End: 2024-05-04

## 2023-11-06 RX ORDER — NIFEDIPINE 60 MG/1
60 TABLET, EXTENDED RELEASE ORAL DAILY
Qty: 90 TABLET | Refills: 3 | Status: SHIPPED | OUTPATIENT
Start: 2023-11-06

## 2023-11-06 RX ADMIN — LIDOCAINE HYDROCHLORIDE AND EPINEPHRINE BITARTRATE 1 ML: 20; .01 INJECTION, SOLUTION SUBCUTANEOUS at 15:14

## 2023-11-06 NOTE — PROGRESS NOTES
Joshua Asher is a 81 y.o. female  Chief Complaint   Patient presents with    Skin Problem       HPI:  Lesion to R upper cheek, enlarging  C/W BCC. Didn't like aldara, too irritating. Needs bx. Will arrange. Consider mohs referral.    Chart reviewed for the following:   Connor Rodriguez MD, have reviewed the History, Physical and updated the Allergic reactions for [address] performed immediately prior to start of procedure:   Connor Rodriguez MD, have performed the following reviews on Joshua Power prior to the start of the procedure:            * Patient was identified by name and date of birth   * Agreement on procedure being performed was verified  * Risks and Benefits explained to the patient, Pt gave understanding  * Procedure site verified and marked as necessary  * Patient was positioned for comfort  * Consent was verified  * Pain level 0 pre-procedure. 2:19 PM    Procedure: Shave biopsy. Consent: given. Details: Clean technique. LA with 2% lidocaine with epi 0.5ml with good LA and rajan. Lesion shaved with slight scoop and placed in formalin for lab analysis. Hemostasis with drysol and direct pressure. Complications: none. EBL: none. Disp: A+O, ambulatory without difficulty. Infection precautions given. Pain level in goal control. If wants/needs derm/ Mohs, would like referral to [facility], Dr. Ricarda Gaytan [facility].    11/6/2023 2:35 PM Addendum: PT also remarked that she is NOT taking xanax, she is taking ambien instead. Did Not  the xanax after last visit, picked up refill of ambien 5mg instead. Has plenty for now, can refill PRN. Also RF BP pills due, ore.     Mello Belle MD

## 2023-11-20 DIAGNOSIS — E78.5 HYPERLIPIDEMIA, UNSPECIFIED: ICD-10-CM

## 2023-11-20 DIAGNOSIS — K21.9 GASTRO-ESOPHAGEAL REFLUX DISEASE WITHOUT ESOPHAGITIS: ICD-10-CM

## 2023-11-20 RX ORDER — CHOLESTYRAMINE 4 G/9G
POWDER, FOR SUSPENSION ORAL
Qty: 756 G | Refills: 12 | Status: SHIPPED | OUTPATIENT
Start: 2023-11-20

## 2023-11-21 DIAGNOSIS — C44.91 SUPERFICIAL BASAL CELL CARCINOMA (BCC): Primary | ICD-10-CM

## 2023-11-21 DIAGNOSIS — I10 ESSENTIAL HYPERTENSION: ICD-10-CM

## 2023-11-21 DIAGNOSIS — I25.10 ASCVD (ARTERIOSCLEROTIC CARDIOVASCULAR DISEASE): ICD-10-CM

## 2023-11-21 DIAGNOSIS — K21.9 GASTROESOPHAGEAL REFLUX DISEASE WITHOUT ESOPHAGITIS: ICD-10-CM

## 2023-11-21 DIAGNOSIS — E78.2 MIXED HYPERLIPIDEMIA: ICD-10-CM

## 2023-11-21 RX ORDER — ROSUVASTATIN CALCIUM 5 MG/1
5 TABLET, COATED ORAL DAILY
Qty: 90 TABLET | Refills: 3 | Status: SHIPPED | OUTPATIENT
Start: 2023-11-21

## 2023-11-21 RX ORDER — LOSARTAN POTASSIUM 100 MG/1
100 TABLET ORAL DAILY
Qty: 90 TABLET | Refills: 3 | Status: SHIPPED | OUTPATIENT
Start: 2023-11-21 | End: 2024-11-15

## 2023-11-21 RX ORDER — NIFEDIPINE 60 MG/1
60 TABLET, FILM COATED, EXTENDED RELEASE ORAL DAILY
Qty: 30 TABLET | Refills: 3 | Status: SHIPPED | OUTPATIENT
Start: 2023-11-21

## 2023-11-21 RX ORDER — OMEPRAZOLE 20 MG/1
CAPSULE, DELAYED RELEASE ORAL
Qty: 90 CAPSULE | Refills: 3 | Status: SHIPPED | OUTPATIENT
Start: 2023-11-21

## 2023-11-21 RX ORDER — METOPROLOL SUCCINATE 50 MG/1
50 TABLET, EXTENDED RELEASE ORAL DAILY
Qty: 90 TABLET | Refills: 3 | Status: SHIPPED | OUTPATIENT
Start: 2023-11-21

## 2023-12-01 ENCOUNTER — TELEPHONE (OUTPATIENT)
Age: 83
End: 2023-12-01

## 2023-12-01 NOTE — TELEPHONE ENCOUNTER
----- Message from Brianna Lora sent at 12/1/2023  1:26 PM EST -----  Subject: Message to Provider    QUESTIONS  Information for Provider? Patient would like Ms. Mariusz Zayas to give her a call   back when she gets back from lunch   ---------------------------------------------------------------------------  --------------  [address]  [phone]; Do not leave any message, patient will call back for answer  ---------------------------------------------------------------------------  --------------  SCRIPT ANSWERS  Relationship to Patient?  Self

## 2023-12-12 ENCOUNTER — OFFICE VISIT (OUTPATIENT)
Age: 83
End: 2023-12-12
Payer: MEDICARE

## 2023-12-12 VITALS
TEMPERATURE: 97.1 F | RESPIRATION RATE: 18 BRPM | DIASTOLIC BLOOD PRESSURE: 72 MMHG | WEIGHT: 142.6 LBS | OXYGEN SATURATION: 96 % | BODY MASS INDEX: 25.27 KG/M2 | HEART RATE: 83 BPM | HEIGHT: 63 IN | SYSTOLIC BLOOD PRESSURE: 138 MMHG

## 2023-12-12 DIAGNOSIS — C44.319 BASAL CELL CARCINOMA (BCC) OF SKIN OF OTHER PART OF FACE: Primary | ICD-10-CM

## 2023-12-12 DIAGNOSIS — L57.0 ACTINIC KERATOSIS: ICD-10-CM

## 2023-12-12 DIAGNOSIS — G47.01 INSOMNIA DUE TO MEDICAL CONDITION: ICD-10-CM

## 2023-12-12 DIAGNOSIS — I10 ESSENTIAL HYPERTENSION: ICD-10-CM

## 2023-12-12 PROCEDURE — 99212 OFFICE O/P EST SF 10 MIN: CPT | Performed by: FAMILY MEDICINE

## 2023-12-12 PROCEDURE — 1090F PRES/ABSN URINE INCON ASSESS: CPT | Performed by: FAMILY MEDICINE

## 2023-12-12 PROCEDURE — 17003 DESTRUCT PREMALG LES 2-14: CPT | Performed by: FAMILY MEDICINE

## 2023-12-12 PROCEDURE — 1123F ACP DISCUSS/DSCN MKR DOCD: CPT | Performed by: FAMILY MEDICINE

## 2023-12-12 PROCEDURE — G8427 DOCREV CUR MEDS BY ELIG CLIN: HCPCS | Performed by: FAMILY MEDICINE

## 2023-12-12 PROCEDURE — 1036F TOBACCO NON-USER: CPT | Performed by: FAMILY MEDICINE

## 2023-12-12 PROCEDURE — G8399 PT W/DXA RESULTS DOCUMENT: HCPCS | Performed by: FAMILY MEDICINE

## 2023-12-12 PROCEDURE — 17000 DESTRUCT PREMALG LESION: CPT | Performed by: FAMILY MEDICINE

## 2023-12-12 PROCEDURE — 3075F SYST BP GE 130 - 139MM HG: CPT | Performed by: FAMILY MEDICINE

## 2023-12-12 PROCEDURE — G8484 FLU IMMUNIZE NO ADMIN: HCPCS | Performed by: FAMILY MEDICINE

## 2023-12-12 PROCEDURE — 3078F DIAST BP <80 MM HG: CPT | Performed by: FAMILY MEDICINE

## 2023-12-12 PROCEDURE — G8419 CALC BMI OUT NRM PARAM NOF/U: HCPCS | Performed by: FAMILY MEDICINE

## 2023-12-12 RX ORDER — ZOLPIDEM TARTRATE 5 MG/1
5 TABLET ORAL NIGHTLY PRN
Qty: 90 TABLET | Refills: 1 | Status: SHIPPED | OUTPATIENT
Start: 2023-12-12 | End: 2024-06-09

## 2023-12-12 RX ORDER — NIFEDIPINE 60 MG/1
60 TABLET, FILM COATED, EXTENDED RELEASE ORAL DAILY
Qty: 90 TABLET | Refills: 3 | Status: SHIPPED | OUTPATIENT
Start: 2023-12-12

## 2023-12-12 NOTE — PROGRESS NOTES
Flaquita Devine is a 81 y.o. female  Chief Complaint   Patient presents with    Skin Problem     Follow up spots on face       HPI:  Lesion to R upper cheek, enlarging  Shave bx showed BCC. Has mohs referral to Dr. Oralia Carter in [facility] at [address] [place] [address] [place], appt on 4/8/23. Also would like AK's tx to back of hands and RF nifedipine and ambien, ludmila well. Chart reviewed for the following:   Barry Lord MD, have reviewed the History, Physical and updated the Allergic reactions for [address] [place] [address] performed immediately prior to start of procedure:   Barry Lord MD, have performed the following reviews on Flaquita Devine prior to the start of the procedure:            * Patient was identified by name and date of birth   * Agreement on procedure being performed was verified  * Risks and Benefits explained to the patient, Pt gave understanding  * Procedure site verified and marked as necessary  * Patient was positioned for comfort  * Consent was verified  * Pain level 0 pre-procedure. 12:03 PM    Procedure: cryotherapy. Consent: given. Details: the lesions were frozen with LN2 for 10 seconds frost time with 1mm frost halo x 2 freeze-thaw cycles. Total of 4 lesions frozen. PT ludmila well. Skin care ed given. Pain level in goal control.     Uriah Felix MD

## 2024-03-08 ENCOUNTER — TELEPHONE (OUTPATIENT)
Age: 84
End: 2024-03-08

## 2024-03-08 NOTE — TELEPHONE ENCOUNTER
Patient called stating she had began feeling bad a few days ago. Today she delivered a fever of 100.2 with chest congestion. Advised for symptoms we recommend taking Mucinex, vit C, zinc, over the counter allergy medication, nasal saline rinse, tylenol/motrin, and plenty of fluids. We are seeing the worse symptoms are better after a few days but the cough and fatigue seem to last up to 2 weeks. Call office if no better or worse. If having shortness of breath (unable to speak in complete sentences) go to the ER to be evaluated. If must go out in public, use masking and good hand hygiene.   Pt verbalized understanding.

## 2024-03-22 ENCOUNTER — TELEPHONE (OUTPATIENT)
Age: 84
End: 2024-03-22

## 2024-03-22 NOTE — TELEPHONE ENCOUNTER
Pt stated that she has been sick since 3/8 and is not feeling any better and wants to be seen today but there is no availability, please advise [phone].

## 2024-04-08 RX ORDER — SPIRONOLACTONE 25 MG/1
12.5 TABLET ORAL DAILY
Qty: 45 TABLET | Refills: 3 | Status: SHIPPED | OUTPATIENT
Start: 2024-04-08

## 2024-04-15 RX ORDER — SPIRONOLACTONE 25 MG/1
12.5 TABLET ORAL DAILY
Qty: 45 TABLET | Refills: 3 | Status: SHIPPED | OUTPATIENT
Start: 2024-04-15

## 2024-06-10 ENCOUNTER — HOSPITAL ENCOUNTER (OUTPATIENT)
Facility: HOSPITAL | Age: 84
Setting detail: OBSERVATION
Discharge: HOME | End: 2024-06-11
Attending: EMERGENCY MEDICINE | Admitting: HOSPITALIST
Payer: MEDICARE

## 2024-06-10 ENCOUNTER — APPOINTMENT (OUTPATIENT)
Facility: HOSPITAL | Age: 84
End: 2024-06-10
Payer: MEDICARE

## 2024-06-10 DIAGNOSIS — H81.10 BENIGN PAROXYSMAL POSITIONAL VERTIGO, UNSPECIFIED LATERALITY: ICD-10-CM

## 2024-06-10 DIAGNOSIS — R11.2 NAUSEA AND VOMITING, UNSPECIFIED VOMITING TYPE: ICD-10-CM

## 2024-06-10 DIAGNOSIS — R42 DIZZINESS: Primary | ICD-10-CM

## 2024-06-10 LAB
ALBUMIN SERPL-MCNC: 3.4 G/DL (ref 3.5–5)
ALBUMIN/GLOB SERPL: 1.1 (ref 1.1–2.2)
ALP SERPL-CCNC: 70 U/L (ref 45–117)
ALT SERPL-CCNC: 27 U/L (ref 12–78)
ANION GAP SERPL CALC-SCNC: 12 MMOL/L (ref 5–15)
AST SERPL-CCNC: 21 U/L (ref 15–37)
BASOPHILS # BLD: 0.1 K/UL (ref 0–0.1)
BASOPHILS NFR BLD: 1 % (ref 0–1)
BILIRUB SERPL-MCNC: 0.2 MG/DL (ref 0.2–1)
BUN SERPL-MCNC: 12 MG/DL (ref 6–20)
BUN/CREAT SERPL: 12 (ref 12–20)
CALCIUM SERPL-MCNC: 8.4 MG/DL (ref 8.5–10.1)
CHLORIDE SERPL-SCNC: 101 MMOL/L (ref 97–108)
CO2 SERPL-SCNC: 24 MMOL/L (ref 21–32)
CREAT SERPL-MCNC: 1 MG/DL (ref 0.55–1.02)
DIFFERENTIAL METHOD BLD: ABNORMAL
EOSINOPHIL # BLD: 0.2 K/UL (ref 0–0.4)
EOSINOPHIL NFR BLD: 2 % (ref 0–7)
ERYTHROCYTE [DISTWIDTH] IN BLOOD BY AUTOMATED COUNT: 15.5 % (ref 11.5–14.5)
GLOBULIN SER CALC-MCNC: 3.1 G/DL (ref 2–4)
GLUCOSE BLD STRIP.AUTO-MCNC: 136 MG/DL (ref 65–117)
GLUCOSE SERPL-MCNC: 146 MG/DL (ref 65–100)
HCT VFR BLD AUTO: 35.1 % (ref 35–47)
HGB BLD-MCNC: 11.7 G/DL (ref 11.5–16)
IMM GRANULOCYTES # BLD AUTO: 0 K/UL (ref 0–0.04)
IMM GRANULOCYTES NFR BLD AUTO: 0 % (ref 0–0.5)
INR PPP: 1 (ref 0.9–1.1)
LYMPHOCYTES # BLD: 3.1 K/UL (ref 0.8–3.5)
LYMPHOCYTES NFR BLD: 42 % (ref 12–49)
MAGNESIUM SERPL-MCNC: 1.6 MG/DL (ref 1.6–2.4)
MCH RBC QN AUTO: 27.7 PG (ref 26–34)
MCHC RBC AUTO-ENTMCNC: 33.3 G/DL (ref 30–36.5)
MCV RBC AUTO: 83.2 FL (ref 80–99)
MONOCYTES # BLD: 0.5 K/UL (ref 0–1)
MONOCYTES NFR BLD: 7 % (ref 5–13)
NEUTS SEG # BLD: 3.7 K/UL (ref 1.8–8)
NEUTS SEG NFR BLD: 49 % (ref 32–75)
NRBC # BLD: 0 K/UL (ref 0–0.01)
NRBC BLD-RTO: 0 PER 100 WBC
PLATELET # BLD AUTO: 288 K/UL (ref 150–400)
PMV BLD AUTO: 9.1 FL (ref 8.9–12.9)
POTASSIUM SERPL-SCNC: 4.3 MMOL/L (ref 3.5–5.1)
PROT SERPL-MCNC: 6.5 G/DL (ref 6.4–8.2)
PROTHROMBIN TIME: 9.5 SEC (ref 9–11.1)
RBC # BLD AUTO: 4.22 M/UL (ref 3.8–5.2)
SERVICE CMNT-IMP: ABNORMAL
SODIUM SERPL-SCNC: 137 MMOL/L (ref 136–145)
TROPONIN I SERPL HS-MCNC: 6 NG/L (ref 0–51)
WBC # BLD AUTO: 7.5 K/UL (ref 3.6–11)

## 2024-06-10 PROCEDURE — 6370000000 HC RX 637 (ALT 250 FOR IP): Performed by: EMERGENCY MEDICINE

## 2024-06-10 PROCEDURE — 84484 ASSAY OF TROPONIN QUANT: CPT

## 2024-06-10 PROCEDURE — 2580000003 HC RX 258: Performed by: EMERGENCY MEDICINE

## 2024-06-10 PROCEDURE — 83735 ASSAY OF MAGNESIUM: CPT

## 2024-06-10 PROCEDURE — 93005 ELECTROCARDIOGRAM TRACING: CPT | Performed by: EMERGENCY MEDICINE

## 2024-06-10 PROCEDURE — 96361 HYDRATE IV INFUSION ADD-ON: CPT

## 2024-06-10 PROCEDURE — 99285 EMERGENCY DEPT VISIT HI MDM: CPT

## 2024-06-10 PROCEDURE — 36415 COLL VENOUS BLD VENIPUNCTURE: CPT

## 2024-06-10 PROCEDURE — 6360000002 HC RX W HCPCS: Performed by: EMERGENCY MEDICINE

## 2024-06-10 PROCEDURE — 80053 COMPREHEN METABOLIC PANEL: CPT

## 2024-06-10 PROCEDURE — 85025 COMPLETE CBC W/AUTO DIFF WBC: CPT

## 2024-06-10 PROCEDURE — 96374 THER/PROPH/DIAG INJ IV PUSH: CPT

## 2024-06-10 PROCEDURE — 70450 CT HEAD/BRAIN W/O DYE: CPT

## 2024-06-10 PROCEDURE — 96376 TX/PRO/DX INJ SAME DRUG ADON: CPT

## 2024-06-10 PROCEDURE — 85610 PROTHROMBIN TIME: CPT

## 2024-06-10 PROCEDURE — 82962 GLUCOSE BLOOD TEST: CPT

## 2024-06-10 RX ORDER — ONDANSETRON 2 MG/ML
4 INJECTION INTRAMUSCULAR; INTRAVENOUS ONCE
Status: COMPLETED | OUTPATIENT
Start: 2024-06-10 | End: 2024-06-10

## 2024-06-10 RX ORDER — ACETAMINOPHEN 500 MG
1000 TABLET ORAL
Status: COMPLETED | OUTPATIENT
Start: 2024-06-10 | End: 2024-06-10

## 2024-06-10 RX ORDER — 0.9 % SODIUM CHLORIDE 0.9 %
1000 INTRAVENOUS SOLUTION INTRAVENOUS ONCE
Status: COMPLETED | OUTPATIENT
Start: 2024-06-10 | End: 2024-06-10

## 2024-06-10 RX ORDER — MECLIZINE HYDROCHLORIDE 25 MG/1
25 TABLET ORAL
Status: COMPLETED | OUTPATIENT
Start: 2024-06-10 | End: 2024-06-10

## 2024-06-10 RX ADMIN — MECLIZINE HYDROCHLORIDE 25 MG: 25 TABLET ORAL at 21:03

## 2024-06-10 RX ADMIN — ONDANSETRON 4 MG: 2 INJECTION INTRAMUSCULAR; INTRAVENOUS at 22:27

## 2024-06-10 RX ADMIN — ONDANSETRON 4 MG: 2 INJECTION INTRAMUSCULAR; INTRAVENOUS at 20:59

## 2024-06-10 RX ADMIN — SODIUM CHLORIDE 1000 ML: 9 INJECTION, SOLUTION INTRAVENOUS at 20:58

## 2024-06-10 RX ADMIN — ACETAMINOPHEN 1000 MG: 500 TABLET ORAL at 23:39

## 2024-06-10 ASSESSMENT — LIFESTYLE VARIABLES
HOW OFTEN DO YOU HAVE A DRINK CONTAINING ALCOHOL: NEVER
HOW MANY STANDARD DRINKS CONTAINING ALCOHOL DO YOU HAVE ON A TYPICAL DAY: PATIENT DOES NOT DRINK

## 2024-06-10 ASSESSMENT — ENCOUNTER SYMPTOMS
DIARRHEA: 1
SORE THROAT: 0
NAUSEA: 1
VOMITING: 1
ABDOMINAL PAIN: 0
BACK PAIN: 0
SHORTNESS OF BREATH: 0

## 2024-06-10 ASSESSMENT — PAIN - FUNCTIONAL ASSESSMENT
PAIN_FUNCTIONAL_ASSESSMENT: ACTIVITIES ARE NOT PREVENTED
PAIN_FUNCTIONAL_ASSESSMENT: NONE - DENIES PAIN

## 2024-06-10 ASSESSMENT — PAIN DESCRIPTION - LOCATION: LOCATION: HEAD;NECK

## 2024-06-10 ASSESSMENT — PAIN SCALES - GENERAL: PAINLEVEL_OUTOF10: 3

## 2024-06-10 ASSESSMENT — PAIN DESCRIPTION - DESCRIPTORS: DESCRIPTORS: ACHING

## 2024-06-11 ENCOUNTER — HOSPITAL ENCOUNTER (OUTPATIENT)
Facility: HOSPITAL | Age: 84
Setting detail: OBSERVATION
Discharge: HOME | End: 2024-06-14
Payer: MEDICARE

## 2024-06-11 VITALS
WEIGHT: 138.8 LBS | HEIGHT: 63 IN | OXYGEN SATURATION: 97 % | DIASTOLIC BLOOD PRESSURE: 71 MMHG | HEART RATE: 72 BPM | BODY MASS INDEX: 24.59 KG/M2 | SYSTOLIC BLOOD PRESSURE: 147 MMHG | TEMPERATURE: 97.9 F | RESPIRATION RATE: 16 BRPM

## 2024-06-11 PROCEDURE — G0378 HOSPITAL OBSERVATION PER HR: HCPCS

## 2024-06-11 PROCEDURE — 97161 PT EVAL LOW COMPLEX 20 MIN: CPT

## 2024-06-11 PROCEDURE — 96372 THER/PROPH/DIAG INJ SC/IM: CPT

## 2024-06-11 PROCEDURE — 2580000003 HC RX 258: Performed by: HOSPITALIST

## 2024-06-11 PROCEDURE — 70551 MRI BRAIN STEM W/O DYE: CPT

## 2024-06-11 PROCEDURE — 6370000000 HC RX 637 (ALT 250 FOR IP): Performed by: HOSPITALIST

## 2024-06-11 PROCEDURE — 6360000002 HC RX W HCPCS: Performed by: HOSPITALIST

## 2024-06-11 PROCEDURE — 97110 THERAPEUTIC EXERCISES: CPT

## 2024-06-11 RX ORDER — SODIUM CHLORIDE 9 MG/ML
INJECTION, SOLUTION INTRAVENOUS PRN
Status: DISCONTINUED | OUTPATIENT
Start: 2024-06-11 | End: 2024-06-11 | Stop reason: HOSPADM

## 2024-06-11 RX ORDER — LOSARTAN POTASSIUM 100 MG/1
100 TABLET ORAL DAILY
Status: DISCONTINUED | OUTPATIENT
Start: 2024-06-11 | End: 2024-06-11 | Stop reason: HOSPADM

## 2024-06-11 RX ORDER — SODIUM CHLORIDE, SODIUM LACTATE, POTASSIUM CHLORIDE, CALCIUM CHLORIDE 600; 310; 30; 20 MG/100ML; MG/100ML; MG/100ML; MG/100ML
INJECTION, SOLUTION INTRAVENOUS CONTINUOUS
Status: DISCONTINUED | OUTPATIENT
Start: 2024-06-11 | End: 2024-06-11 | Stop reason: HOSPADM

## 2024-06-11 RX ORDER — ACETAMINOPHEN 325 MG/1
650 TABLET ORAL EVERY 6 HOURS PRN
Status: DISCONTINUED | OUTPATIENT
Start: 2024-06-11 | End: 2024-06-11 | Stop reason: HOSPADM

## 2024-06-11 RX ORDER — SPIRONOLACTONE 25 MG/1
12.5 TABLET ORAL DAILY
Status: DISCONTINUED | OUTPATIENT
Start: 2024-06-11 | End: 2024-06-11 | Stop reason: HOSPADM

## 2024-06-11 RX ORDER — ENOXAPARIN SODIUM 100 MG/ML
40 INJECTION SUBCUTANEOUS DAILY
Status: DISCONTINUED | OUTPATIENT
Start: 2024-06-11 | End: 2024-06-11 | Stop reason: HOSPADM

## 2024-06-11 RX ORDER — METOPROLOL SUCCINATE 50 MG/1
50 TABLET, EXTENDED RELEASE ORAL DAILY
Status: DISCONTINUED | OUTPATIENT
Start: 2024-06-11 | End: 2024-06-11 | Stop reason: HOSPADM

## 2024-06-11 RX ORDER — MECLIZINE HYDROCHLORIDE 25 MG/1
25 TABLET ORAL 3 TIMES DAILY
Status: DISCONTINUED | OUTPATIENT
Start: 2024-06-11 | End: 2024-06-11 | Stop reason: HOSPADM

## 2024-06-11 RX ORDER — ROSUVASTATIN CALCIUM 5 MG/1
5 TABLET, COATED ORAL DAILY
Status: DISCONTINUED | OUTPATIENT
Start: 2024-06-11 | End: 2024-06-11 | Stop reason: HOSPADM

## 2024-06-11 RX ORDER — POTASSIUM CHLORIDE 750 MG/1
40 TABLET, FILM COATED, EXTENDED RELEASE ORAL PRN
Status: DISCONTINUED | OUTPATIENT
Start: 2024-06-11 | End: 2024-06-11 | Stop reason: HOSPADM

## 2024-06-11 RX ORDER — ACETAMINOPHEN 650 MG/1
650 SUPPOSITORY RECTAL EVERY 6 HOURS PRN
Status: DISCONTINUED | OUTPATIENT
Start: 2024-06-11 | End: 2024-06-11 | Stop reason: HOSPADM

## 2024-06-11 RX ORDER — SODIUM CHLORIDE 0.9 % (FLUSH) 0.9 %
5-40 SYRINGE (ML) INJECTION EVERY 12 HOURS SCHEDULED
Status: DISCONTINUED | OUTPATIENT
Start: 2024-06-11 | End: 2024-06-11 | Stop reason: HOSPADM

## 2024-06-11 RX ORDER — SODIUM CHLORIDE 0.9 % (FLUSH) 0.9 %
5-40 SYRINGE (ML) INJECTION PRN
Status: DISCONTINUED | OUTPATIENT
Start: 2024-06-11 | End: 2024-06-11 | Stop reason: HOSPADM

## 2024-06-11 RX ORDER — POTASSIUM CHLORIDE 7.45 MG/ML
10 INJECTION INTRAVENOUS PRN
Status: DISCONTINUED | OUTPATIENT
Start: 2024-06-11 | End: 2024-06-11 | Stop reason: HOSPADM

## 2024-06-11 RX ORDER — METOCLOPRAMIDE HYDROCHLORIDE 5 MG/ML
5 INJECTION INTRAMUSCULAR; INTRAVENOUS EVERY 6 HOURS PRN
Status: DISCONTINUED | OUTPATIENT
Start: 2024-06-11 | End: 2024-06-11 | Stop reason: HOSPADM

## 2024-06-11 RX ORDER — MAGNESIUM SULFATE IN WATER 40 MG/ML
2000 INJECTION, SOLUTION INTRAVENOUS PRN
Status: DISCONTINUED | OUTPATIENT
Start: 2024-06-11 | End: 2024-06-11 | Stop reason: HOSPADM

## 2024-06-11 RX ORDER — PANTOPRAZOLE SODIUM 40 MG/1
40 TABLET, DELAYED RELEASE ORAL
Status: DISCONTINUED | OUTPATIENT
Start: 2024-06-11 | End: 2024-06-11 | Stop reason: HOSPADM

## 2024-06-11 RX ORDER — CHOLESTYRAMINE 4 G/9G
2 POWDER, FOR SUSPENSION ORAL
Status: DISCONTINUED | OUTPATIENT
Start: 2024-06-11 | End: 2024-06-11

## 2024-06-11 RX ORDER — CHOLESTYRAMINE LIGHT 4 G/5.7G
2 POWDER, FOR SUSPENSION ORAL NIGHTLY
Status: DISCONTINUED | OUTPATIENT
Start: 2024-06-11 | End: 2024-06-11 | Stop reason: HOSPADM

## 2024-06-11 RX ADMIN — ROSUVASTATIN CALCIUM 5 MG: 5 TABLET, FILM COATED ORAL at 09:57

## 2024-06-11 RX ADMIN — LOSARTAN POTASSIUM 100 MG: 100 TABLET, FILM COATED ORAL at 09:57

## 2024-06-11 RX ADMIN — PANTOPRAZOLE SODIUM 40 MG: 40 TABLET, DELAYED RELEASE ORAL at 06:45

## 2024-06-11 RX ADMIN — MECLIZINE HYDROCHLORIDE 25 MG: 25 TABLET ORAL at 09:57

## 2024-06-11 RX ADMIN — MECLIZINE HYDROCHLORIDE 25 MG: 25 TABLET ORAL at 15:55

## 2024-06-11 RX ADMIN — SPIRONOLACTONE 12.5 MG: 25 TABLET ORAL at 09:57

## 2024-06-11 RX ADMIN — METOPROLOL SUCCINATE 50 MG: 50 TABLET, EXTENDED RELEASE ORAL at 09:57

## 2024-06-11 RX ADMIN — ENOXAPARIN SODIUM 40 MG: 100 INJECTION SUBCUTANEOUS at 09:58

## 2024-06-11 RX ADMIN — SODIUM CHLORIDE, POTASSIUM CHLORIDE, SODIUM LACTATE AND CALCIUM CHLORIDE: 600; 310; 30; 20 INJECTION, SOLUTION INTRAVENOUS at 04:00

## 2024-06-11 ASSESSMENT — PAIN SCALES - GENERAL
PAINLEVEL_OUTOF10: 0
PAINLEVEL_OUTOF10: 3

## 2024-06-11 ASSESSMENT — PAIN DESCRIPTION - LOCATION: LOCATION: NECK

## 2024-06-11 NOTE — DISCHARGE SUMMARY
Discharge Summary    Edwina Martinez  :  1940  MRN:  [id number]    ADMIT DATE:  6/10/2024  DISCHARGE DATE:  2024    PRIMARY CARE PHYSICIAN:  Demetrio Dobbins MD    VISIT STATUS: Observation    CODE STATUS:  Full Code    DISCHARGE DIAGNOSES:  Principal Problem:    Vertigo  Active Problems:    Essential hypertension  Resolved Problems:    * No resolved hospital problems. *      HOSPITAL COURSE:  Vertigo, likely BPPV, symptoms resolved  MRI negative for any acute event  Meclizine prn  PT: no needs  Continue home meds for HTN    SIGNIFICANT DIAGNOSTIC STUDIES:  MRI brain:  IMPRESSION:  Chronic microvascular ischemic disease with no acute infarction.    CONSULTANTS:  None  RECOMMENDED NEXT STEPS:   Meclizine prn     DISCHARGE MEDICATIONS:         Medication List        CHANGE how you take these medications      cholestyramine 4 GM/DOSE powder  Commonly known as: QUESTRAN  MIX 1 SCOOP WITH LIQUID AND DRINK TWICE DAILY  What changed: See the new instructions.            CONTINUE taking these medications      losartan 100 MG tablet  Commonly known as: COZAAR  Take 1 tablet by mouth daily     meclizine 25 MG tablet  Commonly known as: ANTIVERT  Take 1 tablet by mouth 3 times daily as needed for Dizziness     meloxicam 7.5 MG tablet  Commonly known as: MOBIC  Take 1 tablet by mouth daily     metoprolol succinate 50 MG extended release tablet  Commonly known as: TOPROL XL  Take 1 tablet by mouth daily     NIFEdipine 60 MG extended release tablet  Commonly known as: ADALAT CC  Take 1 tablet by mouth daily Indications: pressure     rosuvastatin 5 MG tablet  Commonly known as: CRESTOR  Take 1 tablet by mouth daily     spironolactone 25 MG tablet  Commonly known as: ALDACTONE  Take 0.5 tablets by mouth daily     zolpidem 5 MG tablet  Commonly known as: Ambien  Take 1 tablet by mouth nightly as needed for Sleep for up to 180 days. Max Daily Amount: 5 mg            STOP taking these medications      acetaminophen 500 MG

## 2024-06-11 NOTE — ED NOTES
Admission SBAR Note  Situation/Background:     Patient is being transferred to Med/Surg ([facility]), Room# 120    Patient's Chief Complaint was Dizziness and is admitted for dizziness, nausea, vomiting, and vertigo.    CODE STATUS: Full  CSSRS: 0 - No Risk    ISOLATION/PRECAUTIONS: No    Is this a behavioral health patient? No    STAT labs collected: Yes    Repeat Lactic Acid DUE? No    All STAT orders are complete: Yes    The following personal items will be sent with the patient during transfer to the floor:     All valuables: see flowsheet      ASSESSMENT:    NEURO:   NIH SCORE: 0,1-4,5-15,15-20,21-42: 0   JORDAN SWALLOW SCREEN COMPLETE: Yes  ORIENTATION LEVEL: ORIENTATION LEVEL: Person, Place, Time, and Situation  Cognition:  appropriate decision making, appropriate for age attention/concentration, appropriate safety awareness, decreased attention/concentration, following commands  follows multi-step simple commands/direction and follows multi-step complex commands/direction, and recognition of people/places  Speech: shows no evidence of impairment    Is patient impulsive? No  Is patient oriented? Yes  Do they follow commands? Yes  Is the patient ambulatory? Yes    FALL RISK? Yes  Interventions: Implemented/recommended use of non-skid footwear, Implemented/recommended use of fall risk identification flag to all team members, Implemented/recommended assistive devices and encouraged their use, Implemented/recommended environmental changes (remove hazards, lower bed, improve lighting, etc.), and Implemented/recommended increased supervision/assistance    RESPIRATORY:   Is patient on oxygen? No    CARDIAC:   Is cardiac monitoring ordered? Yes    Last Rhythm: Rhythm including paccardio: Normal Sinus Rhythm   LINE ACCESS: 18G Peripheral IV , Antecubital        /GI:   Continent Bowel/Bladder? Yes  Was UA with reflex sent to lab? Yes  If no, collect and

## 2024-06-11 NOTE — DISCHARGE INSTR - COC
Continuity of Care Form    Patient Name: Edwina Martinez   :  1940  MRN:  [id number]    Admit date:  6/10/2024  Discharge date:  ***    Code Status Order: Full Code   Advance Directives:     Admitting Physician:  Angus Mejia DO  PCP: Demetrio Dobbins MD    Discharging Nurse: ***  Discharging Hospital Unit/Room#: 120/01  Discharging Unit Phone Number: ***    Emergency Contact:   Extended Emergency Contact Information  Primary Emergency Contact: Madeleine Preciado  Home Phone: [phone]  Mobile Phone: [phone]  Relation: Other    Past Surgical History:  Past Surgical History:   Procedure Laterality Date    APPENDECTOMY      DILATION AND CURETTAGE OF UTERUS      colonization    SALPINGO-OOPHORECTOMY      Exploratory Lap, omentectomy, tumor debulking ([facility] [place])    TONSILLECTOMY  1964    TOTAL COLECTOMY      ([facility])       Immunization History:   Immunization History   Administered Date(s) Administered    COVID-19, J&J, (age 18y+), IM, 0.5 mL 2021, 2021    COVID-19, MODERNA BLUE border, Primary or Immunocompromised, (age 12y+), IM, 100 mcg/0.5mL 2021    COVID-19, MODERNA Bivalent, (age 12y+), IM, 50 mcg/0.5 mL 2022    Influenza, FLUAD, (age 65 y+), Adjuvanted, 0.5mL 10/21/2020, 10/18/2021, 10/14/2022, 10/20/2023    Influenza, Triv, inactivated, subunit, adjuvanted, IM (Fluad 65 yrs and older) 10/02/2018, 10/14/2019    Pneumococcal, PCV-13, PREVNAR 13, (age 6w+), IM, 0.5mL 2015    Pneumococcal, PPSV23, PNEUMOVAX 23, (age 2y+), SC/IM, 0.5mL 10/29/2004, 10/21/2020    Zoster Live (Zostavax) 2012       Active Problems:  Patient Active Problem List   Diagnosis Code    Essential hypertension I10    Vertigo R42       Isolation/Infection:   Isolation            No Isolation          Patient Infection Status       None to display            Nurse Assessment:  Last Vital Signs: BP (!) 147/71   Pulse 72   Temp 97.9 °F (36.6 °C) (Oral)   Resp 16   Ht 1.6 m (5' 3\")   Wt 63

## 2024-06-11 NOTE — PLAN OF CARE
Problem: ABCDS Injury Assessment  Goal: Absence of physical injury  6/11/2024 1107 by Madalyn Spicer RN  Outcome: Progressing  6/11/2024 0614 by Ronnie Ly RN  Outcome: Progressing     Problem: Safety - Adult  Goal: Free from fall injury  6/11/2024 1107 by Madalyn Spicer RN  Outcome: Progressing  6/11/2024 0614 by Ronnie Ly RN  Outcome: Progressing     Problem: Neurosensory - Adult  Goal: Achieves stable or improved neurological status  6/11/2024 1107 by Madalyn Spicer RN  Outcome: Progressing  6/11/2024 0614 by Ronnie Ly RN  Outcome: Progressing     Problem: Pain  Goal: Verbalizes/displays adequate comfort level or baseline comfort level  6/11/2024 0614 by Ronnie Ly RN  Outcome: Progressing     Problem: Chronic Conditions and Co-morbidities  Goal: Patient's chronic conditions and co-morbidity symptoms are monitored and maintained or improved  6/11/2024 0614 by Ronnie Ly RN  Outcome: Progressing     
  Problem: ABCDS Injury Assessment  Goal: Absence of physical injury  6/11/2024 1641 by Urbano Johansen RN  Outcome: Adequate for Discharge  6/11/2024 1107 by Madalyn Spicer RN  Outcome: Progressing  6/11/2024 0614 by Ronnie Ly RN  Outcome: Progressing     Problem: Safety - Adult  Goal: Free from fall injury  6/11/2024 1641 by Urbano Johansen RN  Outcome: Adequate for Discharge  6/11/2024 1107 by Madalyn Spicer RN  Outcome: Progressing  6/11/2024 0614 by Ronnie Ly RN  Outcome: Progressing     Problem: Neurosensory - Adult  Goal: Achieves stable or improved neurological status  6/11/2024 1641 by Urbano Johansen RN  Outcome: Adequate for Discharge  6/11/2024 1107 by Madalyn Spicer RN  Outcome: Progressing  6/11/2024 0614 by Ronnie Ly RN  Outcome: Progressing     Problem: Pain  Goal: Verbalizes/displays adequate comfort level or baseline comfort level  6/11/2024 1641 by Urbano Johansen RN  Outcome: Adequate for Discharge  6/11/2024 0614 by Ronnie Ly RN  Outcome: Progressing     Problem: Chronic Conditions and Co-morbidities  Goal: Patient's chronic conditions and co-morbidity symptoms are monitored and maintained or improved  6/11/2024 1641 by Urbano Johansen RN  Outcome: Adequate for Discharge  6/11/2024 0614 by Ronnie Ly RN  Outcome: Progressing     Problem: Physical Therapy - Adult  Goal: By Discharge: Performs mobility at highest level of function for planned discharge setting.  See evaluation for individualized goals.  6/11/2024 1342 by Roshan Sim, REY  Outcome: Adequate for Discharge     
  Problem: ABCDS Injury Assessment  Goal: Absence of physical injury  Outcome: Progressing     Problem: Safety - Adult  Goal: Free from fall injury  Outcome: Progressing     Problem: Neurosensory - Adult  Goal: Achieves stable or improved neurological status  Outcome: Progressing     Problem: Pain  Goal: Verbalizes/displays adequate comfort level or baseline comfort level  Outcome: Progressing     Problem: Chronic Conditions and Co-morbidities  Goal: Patient's chronic conditions and co-morbidity symptoms are monitored and maintained or improved  Outcome: Progressing     
  Problem: Physical Therapy - Adult  Goal: By Discharge: Performs mobility at highest level of function for planned discharge setting.  See evaluation for individualized goals.  Outcome: Adequate for Discharge   PHYSICAL THERAPY EVALUATION/DISCHARGE    Patient: Edwina Martinez (83 y.o. female)  Date: 6/11/2024  Primary Diagnosis: Dizziness [R42]  Vertigo [R42]  Benign paroxysmal positional vertigo, unspecified laterality [H81.10]  Nausea and vomiting, unspecified vomiting type [R11.2]       Precautions: General Precautions, Fall Risk, Bed Alarm                      ASSESSMENT AND RECOMMENDATIONS:  Based on the objective data below, the patient presented at or near her functional baseline. Pt presented resting in bed. Vitals taken as noted per flowsheet. MD arrived to assess pt during therapy session. Once completed pt transferred to EOB; good sitting balance. When ready she transferred to standing and was able to slowly walk down to the rehab gym. Her gait was slow but fairly steady; no LOB, no SOB. Of note, pt did note that she does not walk very much on a normal basis so the 200'+ down to the rehab gym was a long walk for her. Once in the rehab gym she was offered a seated rest break. When ready she was able to ascend/descend 4 stairs w/ R HR and SBA only; cues for step-to-step approach due to sore L knee. She did not want to walk all the way back to her room so therapist rolled her back via recliner chair. She was eventually left resting comfortably in recliner chair and was setup for lunch; tray table and call bell in reach; chair alarm on. Pt appears at or near her functional baseline and subsequently, this will be an evaluation and tx only; d/c PT.     Functional Outcome Measure:  The patient scored 80/100 on the Barthel Index outcome measure which is indicative of being independent in her basic self care.      Further skilled acute physical therapy is not indicated at this time.       PLAN :  Recommendation for 
Discharge     
PAST MEDICAL HISTORY:  CHF (Congestive Heart Failure)     CHF (Congestive Heart Failure)     Degenerative Joint Disease Involving Multiple Joints     HTN (Hypertension)     Hypercholesterolemia     Morbid Obesity     Myocardial Infarction Unclear hx? States never had stents and previous normal cath    Obstructive Sleep Apnea

## 2024-06-11 NOTE — CARE COORDINATION
chart to be scanned under the media tab. Copy provided to Edwina Martinez. Ms. Martinez again stated she did NOT need anything after discharge. Provided her with CM introductory letter and told her to call us if she has any questions or concerns. Ms. Martinez is agrees with the discharge plan.     Transition of Care Plan:    RUR: n/a obs   Prior Level of Functioning: Independent   Disposition:   If SNF or IPR: Date FOC offered:   Date FOC received:   Accepting facility:   Date authorization started with reference number:   Date authorization received and expires:   Follow up appointments: Demetrio Dobbins MD 6/17/2024 at 2pm   DME needed:   Transportation at discharge:   IM/IMM Medicare/ letter given: n/a   Is patient a  and connected with [facility]?    If yes, was [place] transfer form completed and [facility] notified?   Caregiver Contact:   Discharge Caregiver contacted prior to discharge?   Care Conference needed?   Barriers to discharge: None

## 2024-06-11 NOTE — H&P
HISTORY AND PHYSICAL             Date: 6/11/2024        Patient Name: Edwina Martinze     YOB: 1940      Age:  83 y.o.    Chief Complaint     Chief Complaint   Patient presents with    Dizziness    Nausea    Emesis          History Obtained From   patient    History of Present Illness   This is an 84 yo female with a PMHx of HTN, ovarian CA, HLD, who presents with vertigo that started suddenly at 7pm last night. It was worse with movement and was associate with nausea and vomiting. It felt like motion sickness and similar to her previous episodes. No focal deficits.    Past Medical History     Past Medical History:   Diagnosis Date    BCE (basal cell epithelioma) 02/07/2014    Cat scratch fever 09/91    Cervical prolapse 2011    Grade II    Chicken pox 1962    Contact dermatitis and other eczema, due to unspecified cause     Diverticulosis large intestine w/o perforation or abscess w/o bleeding     1985    Essential hypertension     Citizen of [place] measles 1964    History of duodenal ulcer     History of ovarian cancer     Hypercholesterolemia     Menopause     onset at age 52    Osteopenia of multiple sites 06/14/2018    Per bone density study 6/14/18: 10-year probability of a hip fracture 2.2%   and a 10-year probability of a major osteoporosis-related fracture 11%   based on the [place]-adapted WHO algorithm.        Presence of pessary     #2       JACKIE (stress urinary incontinence, female)         Past Surgical History     Past Surgical History:   Procedure Laterality Date    APPENDECTOMY      DILATION AND CURETTAGE OF UTERUS  1963    colonization    SALPINGO-OOPHORECTOMY  7/03    Exploratory Lap, omentectomy, tumor debulking ([facility] [place])    TONSILLECTOMY  1964    TOTAL COLECTOMY      ([facility])        Medications Prior to Admission     Prior to Admission medications    Medication Sig Start Date End Date Taking? Authorizing Provider   spironolactone (ALDACTONE) 25 MG tablet Take 0.5 tablets by mouth daily 4/15/24

## 2024-06-11 NOTE — PROGRESS NOTES
Discharge Summary    Edwina Martinez  :  1940  MRN:  [id number]    ADMIT DATE:  6/10/2024  DISCHARGE DATE:  2024      Discharge instruction reviewed with Patient    Home med's returned n/a    Personal belongings returned Yes    Patient Wheeled to front entrance via wheelchair with Nurse        SIGNED:    Madalyn Spicer RN

## 2024-06-11 NOTE — ED PROVIDER NOTES
[facility] EMERGENCY DEP  EMERGENCY DEPARTMENT ENCOUNTER       Pt Name: Edwina Martinez  MRN: [id number]  Birthdate 1940  Date of evaluation: 6/10/2024  Provider: Aki Lorenzo MD   PCP: Demetrio Dobbins MD  Note Started: 12:30 AM 6/10/24      FINAL IMPRESSION     1. Dizziness    2. Nausea and vomiting, unspecified vomiting type    3. Benign paroxysmal positional vertigo, unspecified laterality          DISPOSITION/PLAN     Disposition:  DISPOSITION        Admit Note: Pt is being admitted by Dr Mejia. The results of their tests and reason(s) for their admission have been discussed with pt and/or available family. They convey agreement and understanding for the need to be admitted and for the admission diagnosis.          CHIEF COMPLAINT       Chief Complaint   Patient presents with    Dizziness    Nausea    Emesis        HISTORY OF PRESENT ILLNESS: 1 or more elements      History From: Patient  None     HPI    Edwina Martinez is a 83 y.o. female who presents complaining of right symptoms she reports this is her fourth episode of vertigo last episode was 4 to 5 months ago.  She reports she was lying in bed got up to adjust the fan in the room started spinning.  She subsequent developed several episodes of nausea vomiting and was unable to take the meclizine.  She called EMS was brought in.  She reports no speech abnormalities but reports that the room looked like it was spinning, no diplopia loss of vision.  No focal weakness she did feel unsteady.  She did not fall denies syncope denies chest pain palpitation shortness of breath, she did report 1 episode of diarrhea.  Was in her normal state of health prior to coming in.      There are no other complaints, changes, or physical findings at this time.     Nursing Notes were all reviewed and agreed with or any disagreements were addressed in the HPI.     REVIEW OF SYSTEMS      Review of Systems   Constitutional:  Negative for activity change, appetite change, chills and

## 2024-06-11 NOTE — ED TRIAGE NOTES
Pt arrived via Ems from home with report of vertigo, n/v x 1 hour. Reports that she vomited approx 5 times - tried to take her meclizine but vomited it back up. Pt with hx of vertigo. Pt received Zofran 4 mg IV by EMS with relief of nausea - no active vomiting. Denies SOB/CP.

## 2024-06-11 NOTE — DISCHARGE INSTR - DIET
Good nutrition is important when healing from an illness, injury, or surgery.  Follow any nutrition recommendations given to you during your hospital stay.   If you were given an oral nutrition supplement while in the hospital, continue to take this supplement at home.  You can take it with meals, in-between meals, and/or before bedtime. These supplements can be purchased at most local grocery stores, pharmacies, and chain [employer]-stores.   If you have any questions about your diet or nutrition, call the hospital and ask for the dietitian.     Spoke with Octaviano, instructed will send message to Misty Romero N.P.once she reviews we can notify of any decision about increasing lasix.

## 2024-06-13 ENCOUNTER — TELEPHONE (OUTPATIENT)
Age: 84
End: 2024-06-13

## 2024-06-13 LAB
EKG ATRIAL RATE: 84 BPM
EKG DIAGNOSIS: NORMAL
EKG P AXIS: 75 DEGREES
EKG P-R INTERVAL: 182 MS
EKG Q-T INTERVAL: 394 MS
EKG QRS DURATION: 86 MS
EKG QTC CALCULATION (BAZETT): 465 MS
EKG R AXIS: -15 DEGREES
EKG T AXIS: 39 DEGREES
EKG VENTRICULAR RATE: 84 BPM

## 2024-06-13 NOTE — TELEPHONE ENCOUNTER
Care Transitions Initial Follow Up Call    Outreach made within 2 business days of discharge: Yes    Patient: Edwina Martinez Patient : 1940   MRN: [id number]  Reason for Admission: There are no discharge diagnoses documented for the most recent discharge.  Discharge Date: 24       Spoke with: Patient    Discharge department/facility: [facility] Interactive Patient Contact:  Was patient able to fill all prescriptions: Yes  Was patient instructed to bring all medications to the follow-up visit: Yes  Is patient taking all medications as directed in the discharge summary? Yes  Does patient understand their discharge instructions: Yes  Does patient have questions or concerns that need addressed prior to 7-14 day follow up office visit: no    Scheduled appointment with PCP within 7-14 days    Follow Up  Future Appointments   Date Time Provider Department Center   2024  2:10 PM Demetrio Dobbins MD [facility] MAIN BS GETACHEW MUNROE LPN

## 2024-06-17 ENCOUNTER — OFFICE VISIT (OUTPATIENT)
Age: 84
End: 2024-06-17

## 2024-06-17 VITALS
HEIGHT: 63 IN | OXYGEN SATURATION: 98 % | BODY MASS INDEX: 24.17 KG/M2 | TEMPERATURE: 98.4 F | DIASTOLIC BLOOD PRESSURE: 73 MMHG | RESPIRATION RATE: 18 BRPM | WEIGHT: 136.4 LBS | HEART RATE: 71 BPM | SYSTOLIC BLOOD PRESSURE: 144 MMHG

## 2024-06-17 DIAGNOSIS — I10 ESSENTIAL HYPERTENSION: ICD-10-CM

## 2024-06-17 DIAGNOSIS — L57.0 ACTINIC KERATOSES: ICD-10-CM

## 2024-06-17 DIAGNOSIS — H81.10 BPPV (BENIGN PAROXYSMAL POSITIONAL VERTIGO), UNSPECIFIED LATERALITY: Primary | ICD-10-CM

## 2024-06-17 ASSESSMENT — PATIENT HEALTH QUESTIONNAIRE - PHQ9
SUM OF ALL RESPONSES TO PHQ QUESTIONS 1-9: 0
2. FEELING DOWN, DEPRESSED OR HOPELESS: NOT AT ALL
SUM OF ALL RESPONSES TO PHQ QUESTIONS 1-9: 0
SUM OF ALL RESPONSES TO PHQ9 QUESTIONS 1 & 2: 0
SUM OF ALL RESPONSES TO PHQ QUESTIONS 1-9: 0
SUM OF ALL RESPONSES TO PHQ QUESTIONS 1-9: 0
1. LITTLE INTEREST OR PLEASURE IN DOING THINGS: NOT AT ALL

## 2024-06-17 NOTE — PROGRESS NOTES
Edwina Martinez is a 83 y.o. female  Chief Complaint   Patient presents with    Dizziness     Pt reports having on/off dizziness that has worsened over the past few weeks.     PT here for VARGAS visit  ADMIT DATE:  6/10/2024                  DISCHARGE DATE:  6/11/2024   VISIT STATUS: Observation   VARGAS call: 6/13/24    HOSPITAL COURSE:  Vertigo, likely BPPV, symptoms resolved  MRI negative for any acute event  Meclizine prn  PT: no needs  Continue home meds for HTN     SIGNIFICANT DIAGNOSTIC STUDIES:  MRI brain:  IMPRESSION:  Chronic microvascular ischemic disease with no acute infarction.    D/c meds:  meclizine 25 MG tablet  Commonly known as: ANTIVERT  Take 1 tablet by mouth 3 times daily as needed for Dizziness      Reviewed PMH, PSH, SH, Medications, allergies (see chart).  Current Outpatient Medications   Medication Sig    spironolactone (ALDACTONE) 25 MG tablet Take 0.5 tablets by mouth daily    NIFEdipine (ADALAT CC) 60 MG extended release tablet Take 1 tablet by mouth daily Indications: pressure    zolpidem (AMBIEN) 5 MG tablet Take 1 tablet by mouth nightly as needed for Sleep for up to 180 days. Max Daily Amount: 5 mg    losartan (COZAAR) 100 MG tablet Take 1 tablet by mouth daily    rosuvastatin (CRESTOR) 5 MG tablet Take 1 tablet by mouth daily    omeprazole (PRILOSEC) 20 MG delayed release capsule TAKE 1 CAPSULE BY MOUTH DAILY INDICATIONS:  PREVENTION OF NSAID-INDUCED GASTRIC ULCER (Patient taking differently: Take 1 capsule by mouth Daily)    metoprolol succinate (TOPROL XL) 50 MG extended release tablet Take 1 tablet by mouth daily    meclizine (ANTIVERT) 25 MG tablet Take 1 tablet by mouth 3 times daily as needed for Dizziness    meloxicam (MOBIC) 7.5 MG tablet Take 1 tablet by mouth daily    cholestyramine (QUESTRAN) 4 GM/DOSE powder MIX 1 SCOOP WITH LIQUID AND DRINK TWICE DAILY (Patient taking differently: Take 1 g by mouth 2 times daily (with meals))     No current facility-administered medications

## 2024-06-17 NOTE — PROGRESS NOTES
Edwina Martinez is a 83 y.o. female presenting for/with:    Chief Complaint   Patient presents with    Dizziness     Pt reports having on/off dizziness that has worsened over the past few weeks.       Vitals:    06/17/24 1413   BP: (!) 144/73   Site: Left Upper Arm   Position: Sitting   Cuff Size: Medium Adult   Pulse: 71   Resp: 18   Temp: 98.4 °F (36.9 °C)   TempSrc: Temporal   SpO2: 98%   Weight: 61.9 kg (136 lb 6.4 oz)   Height: 1.6 m (5' 3\")       Pain Scale: 0 - No pain/10  Pain Location:     \"Have you been to the ER, urgent care clinic since your last visit?  Hospitalized since your last visit?\"    [facility] 06/10/24-06/11/24-Vertigo     “Have you seen or consulted any other health care providers outside of [facility] since your last visit?”    NO                 6/17/2024     2:08 PM   PHQ-9    Little interest or pleasure in doing things 0   Feeling down, depressed, or hopeless 0   PHQ-2 Score 0   PHQ-9 Total Score 0           6/17/2024     2:10 PM 10/20/2023     1:30 PM 10/14/2022    12:00 AM 11/8/2021    12:00 AM 10/18/2021    12:00 AM 8/30/2021    12:00 AM   [facility] AMB LEARNING ASSESSMENT   Primary Learner Patient Patient Patient Patient Patient Patient   Primary Language ENGLISH ENGLISH ENGLISH ENGLISH ENGLISH ENGLISH   Learning Preference DEMONSTRATION DEMONSTRATION DEMONSTRATION DEMONSTRATION READING READING    LISTENING   Answered By patient patient patient patient pt patient   Relationship to Learner SELF SELF SELF SELF SELF SELF            6/17/2024     2:08 PM   Amb Fall Risk Assessment and TUG Test   Do you feel unsteady or are you worried about falling?  no   2 or more falls in past year? no   Fall with injury in past year? no           6/17/2024     2:00 PM 12/12/2023    10:00 AM 11/6/2023     2:00 PM 10/20/2023     1:00 PM 7/17/2023     9:00 AM   ADL ASSESSMENT   Feeding yourself No Help Needed No Help Needed No Help Needed No Help Needed No Help Needed   Getting from bed to chair No Help

## 2024-07-01 ENCOUNTER — TELEPHONE (OUTPATIENT)
Age: 84
End: 2024-07-01

## 2024-07-01 DIAGNOSIS — R42 VERTIGO: ICD-10-CM

## 2024-07-01 DIAGNOSIS — G89.29 CHRONIC MIDLINE LOW BACK PAIN WITHOUT SCIATICA: ICD-10-CM

## 2024-07-01 DIAGNOSIS — M54.50 CHRONIC MIDLINE LOW BACK PAIN WITHOUT SCIATICA: ICD-10-CM

## 2024-07-01 RX ORDER — MECLIZINE HYDROCHLORIDE 25 MG/1
25 TABLET ORAL 3 TIMES DAILY PRN
Qty: 30 TABLET | Refills: 5 | Status: SHIPPED | OUTPATIENT
Start: 2024-07-01

## 2024-07-01 RX ORDER — MELOXICAM 7.5 MG/1
7.5 TABLET ORAL DAILY
Qty: 90 TABLET | Refills: 3 | Status: SHIPPED | OUTPATIENT
Start: 2024-07-01

## 2024-07-01 NOTE — TELEPHONE ENCOUNTER
Pt requesting med refill for Meclizine 25 mg and Meloxicam 7.5 mg be called in to [facility]    Requesting referral  to [facility]    Re: vertigo and back pain

## 2024-10-15 ENCOUNTER — OFFICE VISIT (OUTPATIENT)
Age: 84
End: 2024-10-15
Payer: MEDICARE

## 2024-10-15 VITALS — RESPIRATION RATE: 18 BRPM | TEMPERATURE: 97.5 F | HEART RATE: 82 BPM | OXYGEN SATURATION: 98 %

## 2024-10-15 DIAGNOSIS — T48.5X5A RHINITIS MEDICAMENTOSA: Primary | ICD-10-CM

## 2024-10-15 DIAGNOSIS — J31.0 RHINITIS MEDICAMENTOSA: Primary | ICD-10-CM

## 2024-10-15 PROCEDURE — 1036F TOBACCO NON-USER: CPT | Performed by: FAMILY MEDICINE

## 2024-10-15 PROCEDURE — G8427 DOCREV CUR MEDS BY ELIG CLIN: HCPCS | Performed by: FAMILY MEDICINE

## 2024-10-15 PROCEDURE — G8399 PT W/DXA RESULTS DOCUMENT: HCPCS | Performed by: FAMILY MEDICINE

## 2024-10-15 PROCEDURE — G8420 CALC BMI NORM PARAMETERS: HCPCS | Performed by: FAMILY MEDICINE

## 2024-10-15 PROCEDURE — G8484 FLU IMMUNIZE NO ADMIN: HCPCS | Performed by: FAMILY MEDICINE

## 2024-10-15 PROCEDURE — 1090F PRES/ABSN URINE INCON ASSESS: CPT | Performed by: FAMILY MEDICINE

## 2024-10-15 PROCEDURE — 99213 OFFICE O/P EST LOW 20 MIN: CPT | Performed by: FAMILY MEDICINE

## 2024-10-15 PROCEDURE — 1123F ACP DISCUSS/DSCN MKR DOCD: CPT | Performed by: FAMILY MEDICINE

## 2024-10-15 RX ORDER — FLUTICASONE PROPIONATE 50 MCG
2 SPRAY, SUSPENSION (ML) NASAL DAILY
Qty: 16 G | Refills: 5 | Status: SHIPPED | OUTPATIENT
Start: 2024-10-15

## 2024-10-15 RX ORDER — PREDNISONE 20 MG/1
20 TABLET ORAL DAILY
Qty: 5 TABLET | Refills: 0 | Status: SHIPPED | OUTPATIENT
Start: 2024-10-15 | End: 2024-10-20

## 2024-10-15 ASSESSMENT — PATIENT HEALTH QUESTIONNAIRE - PHQ9
1. LITTLE INTEREST OR PLEASURE IN DOING THINGS: NOT AT ALL
SUM OF ALL RESPONSES TO PHQ9 QUESTIONS 1 & 2: 0
SUM OF ALL RESPONSES TO PHQ QUESTIONS 1-9: 0
SUM OF ALL RESPONSES TO PHQ QUESTIONS 1-9: 0
2. FEELING DOWN, DEPRESSED OR HOPELESS: NOT AT ALL
SUM OF ALL RESPONSES TO PHQ QUESTIONS 1-9: 0
SUM OF ALL RESPONSES TO PHQ QUESTIONS 1-9: 0

## 2024-10-15 NOTE — PROGRESS NOTES
Edwina Martinez is a 84 y.o. female presenting for/with:    Chief Complaint   Patient presents with    Sinus Problem     C/o sneezing and runny nose x 1 month ... Not taking any otc allergy medications... denies cough, fever or headache       Vitals:    10/15/24 1525   Pulse: 82   Resp: 18   Temp: 97.5 °F (36.4 °C)   TempSrc: Temporal   SpO2: 98%       Pain Scale: 0 - No pain/10  Pain Location:     \"Have you been to the ER, urgent care clinic since your last visit?  Hospitalized since your last visit?\"    NO    “Have you seen or consulted any other health care providers outside of [facility] since your last visit?”    NO                 10/15/2024     3:25 PM   PHQ-9    Little interest or pleasure in doing things 0   Feeling down, depressed, or hopeless 0   PHQ-2 Score 0   PHQ-9 Total Score 0           6/17/2024     2:10 PM 10/20/2023     1:30 PM 10/14/2022    12:00 AM 11/8/2021    12:00 AM 10/18/2021    12:00 AM 8/30/2021    12:00 AM   [facility] AMB LEARNING ASSESSMENT   Primary Learner Patient Patient Patient Patient Patient Patient   Primary Language ENGLISH ENGLISH ENGLISH ENGLISH ENGLISH ENGLISH   Learning Preference DEMONSTRATION DEMONSTRATION DEMONSTRATION DEMONSTRATION READING READING    LISTENING   Answered By patient patient patient patient pt patient   Relationship to Learner SELF SELF SELF SELF SELF SELF            6/17/2024     2:08 PM   Amb Fall Risk Assessment and TUG Test   Do you feel unsteady or are you worried about falling?  no   2 or more falls in past year? no   Fall with injury in past year? no           6/17/2024     2:00 PM 12/12/2023    10:00 AM 11/6/2023     2:00 PM 10/20/2023     1:00 PM 7/17/2023     9:00 AM   ADL ASSESSMENT   Feeding yourself No Help Needed No Help Needed No Help Needed No Help Needed No Help Needed   Getting from bed to chair No Help Needed No Help Needed No Help Needed No Help Needed No Help Needed   Getting dressed No Help Needed No Help Needed No Help Needed

## 2024-10-15 NOTE — PROGRESS NOTES
dEwina Martinez is a 84 y.o. female  Chief Complaint   Patient presents with    Sinus Problem     C/o sneezing and runny nose x 1 month ... Not taking any otc allergy medications... denies cough, fever or headache     Seen curide 2nd pandemic    HPI:  Symptoms include nasal congestion and rhinorrhea. Has been treating with \"flonase tablets\" and afrin spray nightly for 6 weeks. Only gets better when takes afrin. stable since that time. Evaluation to date: none.     Reviewed PMH, PSH, SH, Medications, allergies (see chart).  Current Outpatient Medications   Medication Sig    fluticasone (FLONASE) 50 MCG/ACT nasal spray 2 sprays by Each Nostril route daily Indications: sinus congestion    predniSONE (DELTASONE) 20 MG tablet Take 1 tablet by mouth daily for 5 days    meloxicam (MOBIC) 7.5 MG tablet Take 1 tablet by mouth daily    meclizine (ANTIVERT) 25 MG tablet Take 1 tablet by mouth 3 times daily as needed for Dizziness    spironolactone (ALDACTONE) 25 MG tablet Take 0.5 tablets by mouth daily    NIFEdipine (ADALAT CC) 60 MG extended release tablet Take 1 tablet by mouth daily Indications: pressure    losartan (COZAAR) 100 MG tablet Take 1 tablet by mouth daily    rosuvastatin (CRESTOR) 5 MG tablet Take 1 tablet by mouth daily    omeprazole (PRILOSEC) 20 MG delayed release capsule TAKE 1 CAPSULE BY MOUTH DAILY INDICATIONS:  PREVENTION OF NSAID-INDUCED GASTRIC ULCER (Patient taking differently: Take 1 capsule by mouth Daily)    metoprolol succinate (TOPROL XL) 50 MG extended release tablet Take 1 tablet by mouth daily    cholestyramine (QUESTRAN) 4 GM/DOSE powder MIX 1 SCOOP WITH LIQUID AND DRINK TWICE DAILY (Patient taking differently: Take 1 g by mouth 2 times daily (with meals))    zolpidem (AMBIEN) 5 MG tablet Take 1 tablet by mouth nightly as needed for Sleep for up to 180 days. Max Daily Amount: 5 mg     No current facility-administered medications for this visit.     ROS:   General: No fever, chills, or

## 2024-11-09 DIAGNOSIS — K21.9 GASTROESOPHAGEAL REFLUX DISEASE WITHOUT ESOPHAGITIS: ICD-10-CM

## 2024-11-09 DIAGNOSIS — I10 ESSENTIAL HYPERTENSION: ICD-10-CM

## 2024-11-09 DIAGNOSIS — E78.2 MIXED HYPERLIPIDEMIA: ICD-10-CM

## 2024-11-11 RX ORDER — ROSUVASTATIN CALCIUM 5 MG/1
5 TABLET, COATED ORAL DAILY
Qty: 90 TABLET | Refills: 3 | Status: SHIPPED | OUTPATIENT
Start: 2024-11-11

## 2024-11-11 RX ORDER — METOPROLOL SUCCINATE 50 MG/1
50 TABLET, EXTENDED RELEASE ORAL DAILY
Qty: 90 TABLET | Refills: 3 | Status: SHIPPED | OUTPATIENT
Start: 2024-11-11

## 2024-11-11 RX ORDER — LOSARTAN POTASSIUM 100 MG/1
100 TABLET ORAL DAILY
Qty: 90 TABLET | Refills: 3 | Status: SHIPPED | OUTPATIENT
Start: 2024-11-11

## 2024-11-25 DIAGNOSIS — E78.5 HYPERLIPIDEMIA, UNSPECIFIED: ICD-10-CM

## 2024-11-25 DIAGNOSIS — K21.9 GASTRO-ESOPHAGEAL REFLUX DISEASE WITHOUT ESOPHAGITIS: ICD-10-CM

## 2024-11-25 RX ORDER — CHOLESTYRAMINE 4 G/9G
POWDER, FOR SUSPENSION ORAL
Qty: 756 G | Refills: 12 | Status: SHIPPED | OUTPATIENT
Start: 2024-11-25

## 2025-04-23 ENCOUNTER — HOSPITAL ENCOUNTER (EMERGENCY)
Facility: HOSPITAL | Age: 85
Discharge: HOME | End: 2025-04-24
Attending: EMERGENCY MEDICINE
Payer: MEDICARE

## 2025-04-23 ENCOUNTER — APPOINTMENT (OUTPATIENT)
Facility: HOSPITAL | Age: 85
End: 2025-04-23
Payer: MEDICARE

## 2025-04-23 VITALS
HEART RATE: 88 BPM | TEMPERATURE: 98 F | OXYGEN SATURATION: 98 % | SYSTOLIC BLOOD PRESSURE: 143 MMHG | WEIGHT: 140 LBS | BODY MASS INDEX: 25.76 KG/M2 | HEIGHT: 62 IN | RESPIRATION RATE: 16 BRPM | DIASTOLIC BLOOD PRESSURE: 58 MMHG

## 2025-04-23 DIAGNOSIS — R11.2 NAUSEA AND VOMITING, UNSPECIFIED VOMITING TYPE: Primary | ICD-10-CM

## 2025-04-23 DIAGNOSIS — N30.00 ACUTE CYSTITIS WITHOUT HEMATURIA: ICD-10-CM

## 2025-04-23 LAB
ALBUMIN SERPL-MCNC: 3.2 G/DL (ref 3.5–5)
ALBUMIN/GLOB SERPL: 1.1 (ref 1.1–2.2)
ALP SERPL-CCNC: 69 U/L (ref 45–117)
ALT SERPL-CCNC: 25 U/L (ref 12–78)
ANION GAP SERPL CALC-SCNC: 8 MMOL/L (ref 2–12)
AST SERPL-CCNC: 20 U/L (ref 15–37)
BASOPHILS # BLD: 0.04 K/UL (ref 0–0.1)
BASOPHILS NFR BLD: 0.5 % (ref 0–1)
BILIRUB SERPL-MCNC: 0.2 MG/DL (ref 0.2–1)
BUN SERPL-MCNC: 17 MG/DL (ref 6–20)
BUN/CREAT SERPL: 16 (ref 12–20)
CALCIUM SERPL-MCNC: 8.2 MG/DL (ref 8.5–10.1)
CHLORIDE SERPL-SCNC: 100 MMOL/L (ref 97–108)
CO2 SERPL-SCNC: 25 MMOL/L (ref 21–32)
CREAT SERPL-MCNC: 1.09 MG/DL (ref 0.55–1.02)
DIFFERENTIAL METHOD BLD: ABNORMAL
EOSINOPHIL # BLD: 0.12 K/UL (ref 0–0.4)
EOSINOPHIL NFR BLD: 1.4 % (ref 0–0.7)
ERYTHROCYTE [DISTWIDTH] IN BLOOD BY AUTOMATED COUNT: 14.3 % (ref 11.5–14.5)
GLOBULIN SER CALC-MCNC: 2.9 G/DL (ref 2–4)
GLUCOSE SERPL-MCNC: 125 MG/DL (ref 65–100)
HCT VFR BLD AUTO: 33.2 % (ref 35–47)
HGB BLD-MCNC: 11 G/DL (ref 11.5–16)
IMM GRANULOCYTES # BLD AUTO: 0.03 K/UL (ref 0–0.04)
IMM GRANULOCYTES NFR BLD AUTO: 0.4 % (ref 0–0.5)
LACTATE SERPL-SCNC: 2 MMOL/L (ref 0.4–2)
LIPASE SERPL-CCNC: 32 U/L (ref 13–75)
LYMPHOCYTES # BLD: 2.24 K/UL (ref 0.8–3.5)
LYMPHOCYTES NFR BLD: 26.7 % (ref 12–49)
MAGNESIUM SERPL-MCNC: 1.4 MG/DL (ref 1.6–2.4)
MCH RBC QN AUTO: 28.1 PG (ref 26–34)
MCHC RBC AUTO-ENTMCNC: 33.1 G/DL (ref 30–36.5)
MCV RBC AUTO: 84.9 FL (ref 80–99)
MONOCYTES # BLD: 0.76 K/UL (ref 0–1)
MONOCYTES NFR BLD: 9.1 % (ref 5–13)
NEUTS SEG # BLD: 5.19 K/UL (ref 1.8–8)
NEUTS SEG NFR BLD: 61.9 % (ref 32–75)
NRBC # BLD: 0 K/UL (ref 0–0.01)
NRBC BLD-RTO: 0 PER 100 WBC
NT PRO BNP: 307 PG/ML (ref 0–450)
PLATELET # BLD AUTO: 256 K/UL (ref 150–400)
PMV BLD AUTO: 9.7 FL (ref 8.9–12.9)
POTASSIUM SERPL-SCNC: 4.8 MMOL/L (ref 3.5–5.1)
PROT SERPL-MCNC: 6.1 G/DL (ref 6.4–8.2)
RBC # BLD AUTO: 3.91 M/UL (ref 3.8–5.2)
SODIUM SERPL-SCNC: 133 MMOL/L (ref 136–145)
TROPONIN I SERPL HS-MCNC: 6 NG/L (ref 0–51)
WBC # BLD AUTO: 8.4 K/UL (ref 3.6–11)

## 2025-04-23 PROCEDURE — 99285 EMERGENCY DEPT VISIT HI MDM: CPT

## 2025-04-23 PROCEDURE — 83880 ASSAY OF NATRIURETIC PEPTIDE: CPT

## 2025-04-23 PROCEDURE — 71045 X-RAY EXAM CHEST 1 VIEW: CPT

## 2025-04-23 PROCEDURE — 87086 URINE CULTURE/COLONY COUNT: CPT

## 2025-04-23 PROCEDURE — 6360000002 HC RX W HCPCS: Performed by: EMERGENCY MEDICINE

## 2025-04-23 PROCEDURE — 83605 ASSAY OF LACTIC ACID: CPT

## 2025-04-23 PROCEDURE — 80053 COMPREHEN METABOLIC PANEL: CPT

## 2025-04-23 PROCEDURE — 74176 CT ABD & PELVIS W/O CONTRAST: CPT

## 2025-04-23 PROCEDURE — 83690 ASSAY OF LIPASE: CPT

## 2025-04-23 PROCEDURE — 96375 TX/PRO/DX INJ NEW DRUG ADDON: CPT

## 2025-04-23 PROCEDURE — 83735 ASSAY OF MAGNESIUM: CPT

## 2025-04-23 PROCEDURE — 2580000003 HC RX 258: Performed by: EMERGENCY MEDICINE

## 2025-04-23 PROCEDURE — 85025 COMPLETE CBC W/AUTO DIFF WBC: CPT

## 2025-04-23 PROCEDURE — 84484 ASSAY OF TROPONIN QUANT: CPT

## 2025-04-23 PROCEDURE — 96365 THER/PROPH/DIAG IV INF INIT: CPT

## 2025-04-23 PROCEDURE — 93005 ELECTROCARDIOGRAM TRACING: CPT | Performed by: EMERGENCY MEDICINE

## 2025-04-23 PROCEDURE — 81001 URINALYSIS AUTO W/SCOPE: CPT

## 2025-04-23 PROCEDURE — 36415 COLL VENOUS BLD VENIPUNCTURE: CPT

## 2025-04-23 RX ORDER — MAGNESIUM SULFATE 1 G/100ML
1000 INJECTION INTRAVENOUS
Status: COMPLETED | OUTPATIENT
Start: 2025-04-23 | End: 2025-04-23

## 2025-04-23 RX ORDER — ONDANSETRON 2 MG/ML
4 INJECTION INTRAMUSCULAR; INTRAVENOUS ONCE
Status: COMPLETED | OUTPATIENT
Start: 2025-04-23 | End: 2025-04-23

## 2025-04-23 RX ORDER — 0.9 % SODIUM CHLORIDE 0.9 %
500 INTRAVENOUS SOLUTION INTRAVENOUS ONCE
Status: COMPLETED | OUTPATIENT
Start: 2025-04-23 | End: 2025-04-23

## 2025-04-23 RX ADMIN — SODIUM CHLORIDE 500 ML: 0.9 INJECTION, SOLUTION INTRAVENOUS at 22:33

## 2025-04-23 RX ADMIN — MAGNESIUM SULFATE HEPTAHYDRATE 1000 MG: 1 INJECTION, SOLUTION INTRAVENOUS at 22:36

## 2025-04-23 RX ADMIN — ONDANSETRON 4 MG: 2 INJECTION, SOLUTION INTRAMUSCULAR; INTRAVENOUS at 20:33

## 2025-04-23 ASSESSMENT — ENCOUNTER SYMPTOMS
SHORTNESS OF BREATH: 0
ABDOMINAL PAIN: 0
NAUSEA: 1
BACK PAIN: 0
VOMITING: 1
DIARRHEA: 0

## 2025-04-23 ASSESSMENT — LIFESTYLE VARIABLES
HOW OFTEN DO YOU HAVE A DRINK CONTAINING ALCOHOL: NEVER
HOW MANY STANDARD DRINKS CONTAINING ALCOHOL DO YOU HAVE ON A TYPICAL DAY: 1 OR 2

## 2025-04-23 ASSESSMENT — PAIN - FUNCTIONAL ASSESSMENT: PAIN_FUNCTIONAL_ASSESSMENT: NONE - DENIES PAIN

## 2025-04-23 NOTE — ED TRIAGE NOTES
Patient was brought in by EMS. She states that she has a feeling of impending doom. When EMS showed up she did have a couple bouts of vomiting, but that is now stopped.

## 2025-04-24 LAB
APPEARANCE UR: CLEAR
BACTERIA URNS QL MICRO: ABNORMAL /HPF
BILIRUB UR QL: NEGATIVE
COLOR UR: ABNORMAL
EPITH CASTS URNS QL MICRO: ABNORMAL /LPF
GLUCOSE UR STRIP.AUTO-MCNC: NEGATIVE MG/DL
HGB UR QL STRIP: ABNORMAL
KETONES UR QL STRIP.AUTO: NEGATIVE MG/DL
LEUKOCYTE ESTERASE UR QL STRIP.AUTO: ABNORMAL
NITRITE UR QL STRIP.AUTO: NEGATIVE
PH UR STRIP: 5.5 (ref 5–8)
PROT UR STRIP-MCNC: NEGATIVE MG/DL
RBC #/AREA URNS HPF: ABNORMAL /HPF (ref 0–5)
SP GR UR REFRACTOMETRY: 1.01 (ref 1–1.03)
URINE CULTURE IF INDICATED: ABNORMAL
UROBILINOGEN UR QL STRIP.AUTO: 0.2 EU/DL (ref 0.2–1)
WBC URNS QL MICRO: ABNORMAL /HPF (ref 0–4)

## 2025-04-24 PROCEDURE — 6370000000 HC RX 637 (ALT 250 FOR IP): Performed by: EMERGENCY MEDICINE

## 2025-04-24 PROCEDURE — 2500000003 HC RX 250 WO HCPCS: Performed by: EMERGENCY MEDICINE

## 2025-04-24 PROCEDURE — 6360000002 HC RX W HCPCS: Performed by: EMERGENCY MEDICINE

## 2025-04-24 RX ORDER — ONDANSETRON 4 MG/1
4 TABLET, ORALLY DISINTEGRATING ORAL 3 TIMES DAILY PRN
Qty: 20 TABLET | Refills: 0 | Status: SHIPPED | OUTPATIENT
Start: 2025-04-24 | End: 2025-04-24

## 2025-04-24 RX ORDER — ONDANSETRON 4 MG/1
4 TABLET, ORALLY DISINTEGRATING ORAL 3 TIMES DAILY PRN
Qty: 20 TABLET | Refills: 0 | Status: SHIPPED | OUTPATIENT
Start: 2025-04-24

## 2025-04-24 RX ORDER — MAGNESIUM OXIDE 400 MG/1
400 TABLET ORAL 2 TIMES DAILY
Qty: 14 TABLET | Refills: 0 | Status: SHIPPED | OUTPATIENT
Start: 2025-04-24 | End: 2025-05-01

## 2025-04-24 RX ORDER — MAGNESIUM HYDROXIDE/ALUMINUM HYDROXICE/SIMETHICONE 120; 1200; 1200 MG/30ML; MG/30ML; MG/30ML
30 SUSPENSION ORAL
Status: COMPLETED | OUTPATIENT
Start: 2025-04-24 | End: 2025-04-24

## 2025-04-24 RX ORDER — CEPHALEXIN 500 MG/1
500 CAPSULE ORAL 3 TIMES DAILY
Qty: 21 CAPSULE | Refills: 0 | Status: SHIPPED | OUTPATIENT
Start: 2025-04-24 | End: 2025-04-24

## 2025-04-24 RX ORDER — FAMOTIDINE 20 MG/1
20 TABLET, FILM COATED ORAL 2 TIMES DAILY
Qty: 60 TABLET | Refills: 0 | Status: SHIPPED | OUTPATIENT
Start: 2025-04-24 | End: 2025-04-24 | Stop reason: CLARIF

## 2025-04-24 RX ORDER — CEPHALEXIN 500 MG/1
500 CAPSULE ORAL 3 TIMES DAILY
Qty: 21 CAPSULE | Refills: 0 | Status: SHIPPED | OUTPATIENT
Start: 2025-04-24 | End: 2025-05-01

## 2025-04-24 RX ORDER — MAGNESIUM OXIDE 400 MG/1
400 TABLET ORAL 2 TIMES DAILY
Qty: 14 TABLET | Refills: 0 | Status: SHIPPED | OUTPATIENT
Start: 2025-04-24 | End: 2025-04-24

## 2025-04-24 RX ADMIN — ALUMINUM HYDROXIDE, MAGNESIUM HYDROXIDE, AND SIMETHICONE 30 ML: 200; 200; 20 SUSPENSION ORAL at 01:13

## 2025-04-24 RX ADMIN — WATER 1000 MG: 1 INJECTION INTRAMUSCULAR; INTRAVENOUS; SUBCUTANEOUS at 01:15

## 2025-04-24 ASSESSMENT — PAIN SCALES - GENERAL: PAINLEVEL_OUTOF10: 0

## 2025-04-24 ASSESSMENT — PAIN DESCRIPTION - DESCRIPTORS: DESCRIPTORS: BURNING

## 2025-04-24 ASSESSMENT — PAIN DESCRIPTION - LOCATION: LOCATION: CHEST

## 2025-04-24 ASSESSMENT — PAIN DESCRIPTION - ORIENTATION: ORIENTATION: MID

## 2025-04-24 NOTE — ED PROVIDER NOTES
[facility] EMERGENCY DEPARTMENT  EMERGENCY DEPARTMENT ENCOUNTER       Pt Name: Edwina Martinez  MRN: [id number]  Birthdate 1940  Date of evaluation: 4/23/2025  Provider: Aki Lorenzo MD   PCP: Albania Cunha APRN - NP  Note Started: 2:03 AM 4/23/25      FINAL IMPRESSION     1. Nausea and vomiting, unspecified vomiting type    2. Acute cystitis without hematuria          DISPOSITION/PLAN     Disposition:  DISPOSITION Decision To Discharge 04/24/2025 02:00:31 AM   DISPOSITION CONDITION Stable           Discharge Note: The patient is stable for discharge home. The signs, symptoms, diagnosis, and discharge instructions have been discussed, understanding conveyed, and agreed upon. The patient is to follow up as recommended or return to ER should their symptoms worsen.      PATIENT REFERRED TO:  Albania Cunha APRN - NP  [address]  [facility] [address] [phone]    Schedule an appointment as soon as possible for a visit in 2 days  For Follow Up            DISCHARGE MEDICATIONS:     Medication List        START taking these medications      cephALEXin 500 MG capsule  Commonly known as: KEFLEX  Take 1 capsule by mouth 3 times daily for 7 days     magnesium oxide 400 MG tablet  Commonly known as: MAG-OX  Take 1 tablet by mouth 2 times daily for 7 days     ondansetron 4 MG disintegrating tablet  Commonly known as: ZOFRAN-ODT  Take 1 tablet by mouth 3 times daily as needed for Nausea or Vomiting            ASK your doctor about these medications      cholestyramine 4 GM/DOSE powder  Commonly known as: QUESTRAN  MIX 1 SCOOP WITH LIQUID AND DRINK TWICE DAILY     fluticasone 50 MCG/ACT nasal spray  Commonly known as: FLONASE  2 sprays by Each Nostril route daily Indications: sinus congestion     losartan 100 MG tablet  Commonly known as: COZAAR  Take 1 tablet by mouth once daily     meclizine 25 MG tablet  Commonly known as: ANTIVERT  Take 1 tablet by mouth 3 times daily as needed for  that parts of this dictation were completed with voice recognition software. Quite often unanticipated grammatical, syntax, homophones, and other interpretive errors are inadvertently transcribed by the computer software. Please disregards these errors. Please excuse any errors that have escaped final proofreading.)             DENISE Lorenzo MD  04/24/25 0649

## 2025-04-25 LAB
BACTERIA SPEC CULT: NORMAL
CC UR VC: NORMAL
EKG ATRIAL RATE: 73 BPM
EKG DIAGNOSIS: NORMAL
EKG P AXIS: 31 DEGREES
EKG P-R INTERVAL: 190 MS
EKG Q-T INTERVAL: 412 MS
EKG QRS DURATION: 86 MS
EKG QTC CALCULATION (BAZETT): 453 MS
EKG R AXIS: -13 DEGREES
EKG T AXIS: 46 DEGREES
EKG VENTRICULAR RATE: 73 BPM
SERVICE CMNT-IMP: NORMAL

## 2025-05-09 RX ORDER — SPIRONOLACTONE 25 MG/1
12.5 TABLET ORAL DAILY
Qty: 45 TABLET | Refills: 3 | Status: SHIPPED | OUTPATIENT
Start: 2025-05-09